# Patient Record
Sex: FEMALE | Race: WHITE | HISPANIC OR LATINO | Employment: FULL TIME | URBAN - METROPOLITAN AREA
[De-identification: names, ages, dates, MRNs, and addresses within clinical notes are randomized per-mention and may not be internally consistent; named-entity substitution may affect disease eponyms.]

---

## 2017-01-10 ENCOUNTER — ROUTINE PRENATAL (OUTPATIENT)
Dept: OBSTETRICS AND GYNECOLOGY | Facility: CLINIC | Age: 27
End: 2017-01-10
Payer: MEDICAID

## 2017-01-10 VITALS — DIASTOLIC BLOOD PRESSURE: 64 MMHG | SYSTOLIC BLOOD PRESSURE: 110 MMHG | WEIGHT: 155.19 LBS

## 2017-01-10 DIAGNOSIS — Z34.82 ENCOUNTER FOR SUPERVISION OF OTHER NORMAL PREGNANCY IN SECOND TRIMESTER: ICD-10-CM

## 2017-01-10 DIAGNOSIS — Z3A.13 13 WEEKS GESTATION OF PREGNANCY: Primary | ICD-10-CM

## 2017-01-10 PROCEDURE — 99213 OFFICE O/P EST LOW 20 MIN: CPT | Mod: S$PBB,TH,, | Performed by: OBSTETRICS & GYNECOLOGY

## 2017-01-10 PROCEDURE — 99212 OFFICE O/P EST SF 10 MIN: CPT | Mod: PBBFAC,PO | Performed by: OBSTETRICS & GYNECOLOGY

## 2017-01-10 PROCEDURE — 99999 PR PBB SHADOW E&M-EST. PATIENT-LVL II: CPT | Mod: PBBFAC,,, | Performed by: OBSTETRICS & GYNECOLOGY

## 2017-01-10 NOTE — PROGRESS NOTES
No complaints today   No nausea or vomiting   Denies vaginal bleeding or cramping   FHT 144x'  Verified with Doppler.  Prenatal labs reviewed  Quad screen ordered     General Pregnancy  recommendations given  PNV + H2O intake    FU in 4 weeks  Anatomy US at 20 weeks     Randy Quiros M.D.   OB/GYN

## 2017-02-07 ENCOUNTER — ROUTINE PRENATAL (OUTPATIENT)
Dept: OBSTETRICS AND GYNECOLOGY | Facility: CLINIC | Age: 27
End: 2017-02-07
Payer: MEDICAID

## 2017-02-07 VITALS — WEIGHT: 155.63 LBS | SYSTOLIC BLOOD PRESSURE: 104 MMHG | DIASTOLIC BLOOD PRESSURE: 64 MMHG

## 2017-02-07 DIAGNOSIS — Z34.82 ENCOUNTER FOR SUPERVISION OF OTHER NORMAL PREGNANCY IN SECOND TRIMESTER: ICD-10-CM

## 2017-02-07 DIAGNOSIS — Z3A.17 17 WEEKS GESTATION OF PREGNANCY: Primary | ICD-10-CM

## 2017-02-07 PROCEDURE — 99212 OFFICE O/P EST SF 10 MIN: CPT | Mod: PBBFAC,PO | Performed by: OBSTETRICS & GYNECOLOGY

## 2017-02-07 PROCEDURE — 99999 PR PBB SHADOW E&M-EST. PATIENT-LVL II: CPT | Mod: PBBFAC,,, | Performed by: OBSTETRICS & GYNECOLOGY

## 2017-02-07 PROCEDURE — 99213 OFFICE O/P EST LOW 20 MIN: CPT | Mod: TH,S$PBB,, | Performed by: OBSTETRICS & GYNECOLOGY

## 2017-02-07 NOTE — PROGRESS NOTES
No complaints today   No nausea or vomiting   Denies vaginal bleeding or cramping   FHT 144x'  Verified with Doppler.  Prenatal labs reviewed  Quad screen declined      General Pregnancy  recommendations given  PNV + H2O intake      Anatomy US at 20 weeks   FU in 4 weeks    Randy Quiros M.D.   OB/GYN

## 2017-03-01 ENCOUNTER — OFFICE VISIT (OUTPATIENT)
Dept: OBSTETRICS AND GYNECOLOGY | Facility: CLINIC | Age: 27
End: 2017-03-01
Payer: MEDICAID

## 2017-03-01 DIAGNOSIS — Z36.3 ANTENATAL SCREENING FOR MALFORMATION USING ULTRASONICS: Primary | ICD-10-CM

## 2017-03-01 DIAGNOSIS — Z3A.17 17 WEEKS GESTATION OF PREGNANCY: ICD-10-CM

## 2017-03-01 PROCEDURE — 76805 OB US >/= 14 WKS SNGL FETUS: CPT | Mod: PBBFAC,PO

## 2017-03-01 PROCEDURE — 76805 OB US >/= 14 WKS SNGL FETUS: CPT | Mod: 26,S$PBB,, | Performed by: OBSTETRICS & GYNECOLOGY

## 2017-03-07 ENCOUNTER — ROUTINE PRENATAL (OUTPATIENT)
Dept: OBSTETRICS AND GYNECOLOGY | Facility: CLINIC | Age: 27
End: 2017-03-07
Payer: MEDICAID

## 2017-03-07 VITALS — SYSTOLIC BLOOD PRESSURE: 100 MMHG | WEIGHT: 154.75 LBS | DIASTOLIC BLOOD PRESSURE: 66 MMHG

## 2017-03-07 DIAGNOSIS — O09.292 HX OF PREECLAMPSIA, PRIOR PREGNANCY, CURRENTLY PREGNANT, SECOND TRIMESTER: ICD-10-CM

## 2017-03-07 DIAGNOSIS — Z3A.21 21 WEEKS GESTATION OF PREGNANCY: Primary | ICD-10-CM

## 2017-03-07 PROCEDURE — 99999 PR PBB SHADOW E&M-EST. PATIENT-LVL II: CPT | Mod: PBBFAC,,, | Performed by: OBSTETRICS & GYNECOLOGY

## 2017-03-07 PROCEDURE — 99212 OFFICE O/P EST SF 10 MIN: CPT | Mod: PBBFAC,PO | Performed by: OBSTETRICS & GYNECOLOGY

## 2017-03-07 PROCEDURE — 99213 OFFICE O/P EST LOW 20 MIN: CPT | Mod: TH,S$PBB,, | Performed by: OBSTETRICS & GYNECOLOGY

## 2017-03-07 RX ORDER — LEVONORGESTREL 52 MG/1
INTRAUTERINE DEVICE INTRAUTERINE
Refills: 0 | COMMUNITY
Start: 2017-02-17

## 2017-03-07 NOTE — PROGRESS NOTES
No complaints today   No nausea or vomiting   Denies vaginal bleeding or cramping   FHT 136x'  Verified with Doppler.  Prenatal labs reviewed  Quad screen declined      General Pregnancy  recommendations given  PNV + H2O intake      Anatomy US at 20 weeks normal xy baby    FU in 4 weeks    Randy Quiros M.D.   OB/GYN

## 2017-04-04 ENCOUNTER — ROUTINE PRENATAL (OUTPATIENT)
Dept: OBSTETRICS AND GYNECOLOGY | Facility: CLINIC | Age: 27
End: 2017-04-04
Payer: MEDICAID

## 2017-04-04 ENCOUNTER — APPOINTMENT (OUTPATIENT)
Dept: LAB | Facility: HOSPITAL | Age: 27
End: 2017-04-04
Attending: OBSTETRICS & GYNECOLOGY
Payer: MEDICAID

## 2017-04-04 VITALS — WEIGHT: 157.88 LBS | DIASTOLIC BLOOD PRESSURE: 68 MMHG | SYSTOLIC BLOOD PRESSURE: 112 MMHG

## 2017-04-04 DIAGNOSIS — N90.89 VULVAR LESION: Primary | ICD-10-CM

## 2017-04-04 DIAGNOSIS — Z34.82 ENCOUNTER FOR SUPERVISION OF OTHER NORMAL PREGNANCY IN SECOND TRIMESTER: ICD-10-CM

## 2017-04-04 DIAGNOSIS — A60.00 GENITAL HERPES SIMPLEX, UNSPECIFIED SITE: ICD-10-CM

## 2017-04-04 PROCEDURE — 87529 HSV DNA AMP PROBE: CPT

## 2017-04-04 PROCEDURE — 99213 OFFICE O/P EST LOW 20 MIN: CPT | Mod: S$PBB,TH,, | Performed by: OBSTETRICS & GYNECOLOGY

## 2017-04-04 PROCEDURE — 99212 OFFICE O/P EST SF 10 MIN: CPT | Mod: PBBFAC,PO | Performed by: OBSTETRICS & GYNECOLOGY

## 2017-04-04 PROCEDURE — 99999 PR PBB SHADOW E&M-EST. PATIENT-LVL II: CPT | Mod: PBBFAC,,, | Performed by: OBSTETRICS & GYNECOLOGY

## 2017-04-04 RX ORDER — VALACYCLOVIR HYDROCHLORIDE 1 G/1
1000 TABLET, FILM COATED ORAL EVERY 12 HOURS
Qty: 20 TABLET | Refills: 0 | Status: SHIPPED | OUTPATIENT
Start: 2017-04-04 | End: 2017-04-14

## 2017-04-04 NOTE — PROGRESS NOTES
Refers one painful vulvar lesion   No nausea or vomiting   Denies vaginal bleeding or cramping   FHT 144x'  Verified with Doppler.  Prenatal labs reviewed  Quad screen declined      0.5 cm pustular lesion on left labia majora , compatible with genital herpes   Herpes culture is taken   Will start valtrex PO     General Pregnancy  recommendations given  PNV + H2O intake      Anatomy US at 20 weeks normal xy baby    FU in 4 weeks    Randy Quiros M.D.   OB/GYN

## 2017-04-10 LAB
HSV PCR SPECIMEN SOURCE: ABNORMAL
HSV1 PCR RESULT: NOT DETECTED
HSV2 PCR RESULT: DETECTED

## 2017-04-11 ENCOUNTER — TELEPHONE (OUTPATIENT)
Dept: OBSTETRICS AND GYNECOLOGY | Facility: CLINIC | Age: 27
End: 2017-04-11

## 2017-04-11 NOTE — TELEPHONE ENCOUNTER
Herpes DNA based probe is positive  Confirms genital herpes  .Please note that this is a sexually transmitted disease, patient  should abstain from sexual activity until sex partner is tested and treated ,and prevent re-infections using condoms.     Rx for valtrex sent to pharmacy after the visit     WIll need to start valtrex again at week 35 to prevent recurrence close to delivery       Randy Quiros M.D.   OB/GYN

## 2017-04-11 NOTE — TELEPHONE ENCOUNTER
Called and informed patient of results (Herpes DNA based probe is positive) patient agreed and verbalized understanding.

## 2017-04-18 ENCOUNTER — LAB VISIT (OUTPATIENT)
Dept: LAB | Facility: HOSPITAL | Age: 27
End: 2017-04-18
Attending: OBSTETRICS & GYNECOLOGY
Payer: MEDICAID

## 2017-04-18 DIAGNOSIS — N90.89 VULVAR LESION: ICD-10-CM

## 2017-04-18 LAB — GLUCOSE SERPL-MCNC: 108 MG/DL

## 2017-04-18 PROCEDURE — 82950 GLUCOSE TEST: CPT

## 2017-04-18 PROCEDURE — 36415 COLL VENOUS BLD VENIPUNCTURE: CPT

## 2017-04-20 ENCOUNTER — TELEPHONE (OUTPATIENT)
Dept: OBSTETRICS AND GYNECOLOGY | Facility: CLINIC | Age: 27
End: 2017-04-20

## 2017-04-21 NOTE — TELEPHONE ENCOUNTER
----- Message from Hannah Pablo sent at 4/20/2017  4:57 PM CDT -----  Contact: Self/[phone]  Patient is returning your call.Please advise

## 2017-05-02 ENCOUNTER — ROUTINE PRENATAL (OUTPATIENT)
Dept: OBSTETRICS AND GYNECOLOGY | Facility: CLINIC | Age: 27
End: 2017-05-02
Payer: MEDICAID

## 2017-05-02 VITALS — SYSTOLIC BLOOD PRESSURE: 100 MMHG | DIASTOLIC BLOOD PRESSURE: 68 MMHG | WEIGHT: 160.69 LBS

## 2017-05-02 DIAGNOSIS — Z34.83 ENCOUNTER FOR SUPERVISION OF OTHER NORMAL PREGNANCY IN THIRD TRIMESTER: ICD-10-CM

## 2017-05-02 DIAGNOSIS — Z3A.29 29 WEEKS GESTATION OF PREGNANCY: Primary | ICD-10-CM

## 2017-05-02 DIAGNOSIS — A60.00 GENITAL HERPES SIMPLEX, UNSPECIFIED SITE: ICD-10-CM

## 2017-05-02 PROCEDURE — 99213 OFFICE O/P EST LOW 20 MIN: CPT | Mod: S$PBB,TH,, | Performed by: OBSTETRICS & GYNECOLOGY

## 2017-05-02 PROCEDURE — 99212 OFFICE O/P EST SF 10 MIN: CPT | Mod: PBBFAC,PO | Performed by: OBSTETRICS & GYNECOLOGY

## 2017-05-02 PROCEDURE — 99999 PR PBB SHADOW E&M-EST. PATIENT-LVL II: CPT | Mod: PBBFAC,,, | Performed by: OBSTETRICS & GYNECOLOGY

## 2017-05-02 RX ORDER — METRONIDAZOLE 500 MG/1
500 TABLET ORAL EVERY 12 HOURS
Qty: 14 TABLET | Refills: 0 | Status: SHIPPED | OUTPATIENT
Start: 2017-05-02 | End: 2017-05-09

## 2017-05-11 ENCOUNTER — HOSPITAL ENCOUNTER (OUTPATIENT)
Facility: HOSPITAL | Age: 27
Discharge: HOME | End: 2017-05-12
Attending: OBSTETRICS & GYNECOLOGY | Admitting: OBSTETRICS & GYNECOLOGY
Payer: MEDICAID

## 2017-05-11 DIAGNOSIS — Z3A.30 30 WEEKS GESTATION OF PREGNANCY: Primary | ICD-10-CM

## 2017-05-11 DIAGNOSIS — M54.9 BACK PAIN: ICD-10-CM

## 2017-05-11 DIAGNOSIS — N12 PYELONEPHRITIS: ICD-10-CM

## 2017-05-11 LAB
AMORPH CRY URNS QL MICRO: NORMAL
BACTERIA #/AREA URNS HPF: NORMAL /HPF
BASOPHILS # BLD AUTO: 0.01 K/UL
BASOPHILS NFR BLD: 0.1 %
BILIRUB UR QL STRIP: NEGATIVE
CLARITY UR: CLEAR
COLOR UR: YELLOW
DIFFERENTIAL METHOD: ABNORMAL
EOSINOPHIL # BLD AUTO: 0.1 K/UL
EOSINOPHIL NFR BLD: 0.6 %
ERYTHROCYTE [DISTWIDTH] IN BLOOD BY AUTOMATED COUNT: 13.7 %
GLUCOSE UR QL STRIP: NEGATIVE
HCT VFR BLD AUTO: 34 %
HGB BLD-MCNC: 11.1 G/DL
HGB UR QL STRIP: NEGATIVE
KETONES UR QL STRIP: NEGATIVE
LEUKOCYTE ESTERASE UR QL STRIP: ABNORMAL
LYMPHOCYTES # BLD AUTO: 2.4 K/UL
LYMPHOCYTES NFR BLD: 17.3 %
MCH RBC QN AUTO: 28 PG
MCHC RBC AUTO-ENTMCNC: 32.6 %
MCV RBC AUTO: 86 FL
MICROSCOPIC COMMENT: NORMAL
MONOCYTES # BLD AUTO: 0.6 K/UL
MONOCYTES NFR BLD: 4.3 %
NEUTROPHILS # BLD AUTO: 10.7 K/UL
NEUTROPHILS NFR BLD: 76.8 %
NITRITE UR QL STRIP: NEGATIVE
PH UR STRIP: 8 [PH] (ref 5–8)
PLATELET # BLD AUTO: 263 K/UL
PMV BLD AUTO: 9.4 FL
PROT UR QL STRIP: NEGATIVE
RBC # BLD AUTO: 3.97 M/UL
RBC #/AREA URNS HPF: 2 /HPF (ref 0–4)
SP GR UR STRIP: 1.01 (ref 1–1.03)
SQUAMOUS #/AREA URNS HPF: NORMAL /HPF
URN SPEC COLLECT METH UR: ABNORMAL
UROBILINOGEN UR STRIP-ACNC: NEGATIVE EU/DL
WBC # BLD AUTO: 13.91 K/UL
WBC #/AREA URNS HPF: 3 /HPF (ref 0–5)

## 2017-05-11 PROCEDURE — 59025 FETAL NON-STRESS TEST: CPT

## 2017-05-11 PROCEDURE — 63600175 PHARM REV CODE 636 W HCPCS: Performed by: OBSTETRICS & GYNECOLOGY

## 2017-05-11 PROCEDURE — 81000 URINALYSIS NONAUTO W/SCOPE: CPT

## 2017-05-11 PROCEDURE — 96361 HYDRATE IV INFUSION ADD-ON: CPT | Mod: 59

## 2017-05-11 PROCEDURE — 96360 HYDRATION IV INFUSION INIT: CPT

## 2017-05-11 PROCEDURE — 25000003 PHARM REV CODE 250: Performed by: OBSTETRICS & GYNECOLOGY

## 2017-05-11 PROCEDURE — 87086 URINE CULTURE/COLONY COUNT: CPT

## 2017-05-11 PROCEDURE — 99211 OFF/OP EST MAY X REQ PHY/QHP: CPT

## 2017-05-11 PROCEDURE — 85025 COMPLETE CBC W/AUTO DIFF WBC: CPT

## 2017-05-11 PROCEDURE — 36415 COLL VENOUS BLD VENIPUNCTURE: CPT

## 2017-05-11 RX ORDER — OXYCODONE AND ACETAMINOPHEN 5; 325 MG/1; MG/1
1 TABLET ORAL EVERY 8 HOURS PRN
Status: DISCONTINUED | OUTPATIENT
Start: 2017-05-11 | End: 2017-05-12 | Stop reason: HOSPADM

## 2017-05-11 RX ORDER — ACETAMINOPHEN 500 MG
500 TABLET ORAL EVERY 6 HOURS PRN
Status: DISCONTINUED | OUTPATIENT
Start: 2017-05-11 | End: 2017-05-11

## 2017-05-11 RX ORDER — PRENATAL WITH FERROUS FUM AND FOLIC ACID 3080; 920; 120; 400; 22; 1.84; 3; 20; 10; 1; 12; 200; 27; 25; 2 [IU]/1; [IU]/1; MG/1; [IU]/1; MG/1; MG/1; MG/1; MG/1; MG/1; MG/1; UG/1; MG/1; MG/1; MG/1; MG/1
1 TABLET ORAL DAILY
Status: DISCONTINUED | OUTPATIENT
Start: 2017-05-12 | End: 2017-05-12 | Stop reason: HOSPADM

## 2017-05-11 RX ORDER — ONDANSETRON 8 MG/1
8 TABLET, ORALLY DISINTEGRATING ORAL EVERY 8 HOURS PRN
Status: DISCONTINUED | OUTPATIENT
Start: 2017-05-11 | End: 2017-05-12 | Stop reason: HOSPADM

## 2017-05-11 RX ADMIN — OXYCODONE HYDROCHLORIDE AND ACETAMINOPHEN 1 TABLET: 5; 325 TABLET ORAL at 01:05

## 2017-05-11 RX ADMIN — SODIUM CHLORIDE, SODIUM LACTATE, POTASSIUM CHLORIDE, AND CALCIUM CHLORIDE 1000 ML: .6; .31; .03; .02 INJECTION, SOLUTION INTRAVENOUS at 03:05

## 2017-05-11 RX ADMIN — CEFTRIAXONE 1 G: 1 INJECTION, SOLUTION INTRAVENOUS at 03:05

## 2017-05-11 RX ADMIN — OXYCODONE HYDROCHLORIDE AND ACETAMINOPHEN 1 TABLET: 5; 325 TABLET ORAL at 04:05

## 2017-05-11 RX ADMIN — ONDANSETRON 8 MG: 8 TABLET, ORALLY DISINTEGRATING ORAL at 01:05

## 2017-05-11 NOTE — H&P
OBSTERICS & GYNECOLOGY   ANTEPARTUM    Admit Date: 5/11/2017   LOS: 0 days     Reason for Admission:  Pyelonephritis  In pregnancy    SUBJECTIVE:     Jessi Samson is a 26 y.o. female at 30w6d who is here for 24 hours of   Severe back pain, radiated to the right,   Also suprapubic pain,  . No fever.     Patient reports None contractions, active fetal movement, Yes vaginal bleeding , No loss of fluid    Scheduled Meds:   cefTRIAXone (ROCEPHIN) IVPB  1 g Intravenous Q24H     Continuous Infusions:   lactated ringers 1,000 mL (05/11/17 1510)     PRN Meds:ondansetron, oxycodone-acetaminophen, promethazine (PHENERGAN) IVPB    Review of patient's allergies indicates:  No Known Allergies    OBJECTIVE:     Vital Signs (Most Recent)  Temp: 98.2 °F (36.8 °C) (05/11/17 1000)    Temperature Range Min/Max (Last 24H):  Temp:  [98.2 °F (36.8 °C)]     Vital Signs Range (Last 24H):  Temp:  [98.2 °F (36.8 °C)]     I & O (Last 24H):No intake or output data in the 24 hours ending 05/11/17 2226    Physical Exam:  General: well developed, well nourished, mild distress   Physical Exam     Constitutional: She is oriented to person, place, and time. She appears well-developed and well-nourished.  HENT:   Head: Normocephalic.   NECK: Neck symmetric without masses or thyromegaly.  Cardiovascular: Normal rate and regular rhythm.   Pulmonary/Chest: Effort normal and breath sounds normal. No respiratory distress. She has no wheezes.   Breasts: Symmetrical, no skin changes or visible lesions. No palpable masses, nipple discharge or adenopathy bilaterally.  Abdominal: Soft not distended. Bowel sounds are normal. She exhibits   no masses . No tenderness to palpation. Positive  CVA tenderness on the right   Genitourinary: Pelvic exam was performed with patient supine.   SSE cervix closed   Extremities normal no cyanosis ,edema.           FHT: 140 Cat 1 (reassuring)                 TOCO: Q 0 minutes     Laboratory:  CBC:   Recent Labs  Lab  17  1319   WBC 13.91*   RBC 3.97*   HGB 11.1*   HCT 34.0*      MCV 86   MCH 28.0   MCHC 32.6         ASSESSMENT/PLAN:     Active Hospital Problems    Diagnosis  POA    Back pain [M54.9]  Yes      Resolved Hospital Problems    Diagnosis Date Resolved POA   No resolved problems to display.       Assessment:   26 y.o.female  at 30w6d   Acute pyelonephritis     Plan:  Admit to hospital for IV hydration , pain control   WIll start Ceftriaxone 1 gr IV a day   NST BID  PNV  BCB in the am     Randy Quiros M.D.   OB/GYN    2017          Randy Quiros M.D.   OB/GYN    2017

## 2017-05-11 NOTE — IP AVS SNAPSHOT
[facility]  [address]  [place] [address]  Phone: [phone]           Instrucciones de Lee Ann de Pacientes  Nuestra meta es prepararlo para el éxito. Roshni paquete incluye información sobre rosales condición, medicamentos e instrucciones para cuidados en el hogar. Casa le ayudará a cuidarse y prevenir la necesidad de volver al hospital.     Por favor, hable con rosales enfermero o enfermera si tiene alguna pregunta..     Hay muchos detalles a recordar cuando se prepara para salir del hospital. Casa es lo que necesita hacer:    1. [place] rosales medicina. Si usted tiene marv receta, revise la lista de medicamentos en las siguientes páginas. Es posible que tenga nuevos medicamentos para recoger en la farmacia y otros que tendrá que dejar de jovita. Revise las instrucciones sobre cómo y cuándo jovita shelia medicamentos. Consulte con el médico o el enfermeras si no está seguro de qué hacer.    2. Ir a shelia citas de seguimiento. La información específica de seguimiento aparece en las siguientes páginas. Usted puede ser contactado por marv enfermera o proveedor clínico sobre las próximas citas. Estar seguro de que tiene todos los números de teléfono para comunicarse si es necesario. Por favor, póngase en contacto con la oficina de rosales profesional médico si no puede hacer marv juan.     3. Esté atento a señales de advertencia. El médico o la enfermera le dará señales de alarma detallados que debe observar y cuándo llamar para la ayuda. Estas instrucciones también pueden incluir información educativa acerca de rosales condición. Si usted experimenta cualquiera de las señales de advertencia para rosales rickie, llame a rosales médico.             [facility]    Si rosales médico no le ha indicado a lo contrário, por favor   contactar a [facility], nuestra línea de cuidado de enfermeras está disponible para asistirle 24/7.    [phone] (servicio [facility]Re[facility])    Enfermeras registradas de [facility] pueden ayudarle a reservar marv juan,  proveer educación para la rickie, asesoría clínica, y otros servicios de asesoramiento.                  ** Verificar la lista de medicamentos es correcta y está actualizada. Llevar esto con usted en reyna de emergencia. Si shelia medicamentos leung cambiado, por favor notifique a rosales proveedor de atención médica.             Lista de medicamentos      EMPIECE a jovita estos medicamentos        Additional Info                      oxycodone-acetaminophen 5-325 mg per tablet   También conocido louis:  ROXICET   Cantidad:  20 tablet   Recargas:  0   Dosis:  2 tablet    Última administración:  1 tablet on 5/12/2017 12:43 PM   Instrucciones:  Take 2 tablets by mouth every 8 (eight) hours as needed for Pain.     Begin Date    AM    Noon    PM    Bedtime         SIGA tomando estos medicamentos        Additional Info                      MIRENA 20 mcg/24 hr (5 years) IUD   Recargas:  0   Medicamento genérico:  levonorgestrel    Instrucciones:  TO BE INSERTED ONE TIME BY PRESCRIBER. ROUTE INTRAUTERINE.     Begin Date    AM    Noon    PM    Bedtime       prenatal #108-iron,carbonyl-FA 30-1 mg Tab   Recargas:  0    Instrucciones:  Take by mouth once daily.     Begin Date    AM    Noon    PM    Bedtime       valacyclovir 1000 MG tablet   También conocido louis:  VALTREX   Cantidad:  20 tablet   Recargas:  0   Dosis:  1000 mg    Instrucciones:  Take 1 tablet (1,000 mg total) by mouth every 12 (twelve) hours.     Begin Date    AM    Noon    PM    Bedtime            Dónde puede recoger shelia medicamentos      Estos medicamentos fueron enviados a [facility] [address]  [address], [place] [address]     Teléfono:  [phone]     oxycodone-acetaminophen 5-325 mg per tablet                  Por favor traiga a todos las citas de seguimiento:    1. Jessica copia de las instrucciones de darin.  2. Todos los medicamentos que está tomando paola momento, en shelia envases  originales.  3. Identificación y tarjeta de seguro.    Por favor llegue 15 minutos antes de la hora de la juan.    Por favor llame con 24 horas de antelación si tiene que cambiar desai juan y / o tiempo.        Brittnee Citas Programadas     May 23, 2017  9:00 AM CDT   Routine Prenatal Visit with MD [place] [facility] ([facility])    [address] [place] [address], [address]  5th Floor [facility]  [place] [address]   [phone]              Información de seguimiento     Seguimiento:       Cuándo:  5/19/2017        Instrucciones de darin     Órdenes Futuras    Activity as tolerated     Call MD for:  persistent nausea and vomiting or diarrhea     Call MD for:  redness, tenderness, or signs of infection (pain, swelling, redness, odor or green/yellow discharge around incision site)     Call MD for:  severe uncontrolled pain     Call MD for:  temperature >100.4     Diet general     Preguntas:    Total calories:      Fat restriction, if any:      Protein restriction, if any:      Na restriction, if any:      Fluid restriction:      Additional restrictions:          Instrucciones a cuate de darin       Discharge home in stable condition. Follow up with Dr. Quiros in office late next week. Patient may return to work no earlier than 5/17/2017.       Primary Diagnosis     Desai diagnosis primaria es:  30 Weeks Gestation Of Pregnancy      Información de Admisión     Fecha y hora Proveedor Departamento [facility]    5/11/2017  9:30 AM Randy Quiros MD [facility] [id number]      Los proveedores de cuidado     Personal Médico Rol [facility] Teléfono principal de la oficina    Randy Quiros MD Attending Provider Obstetrics and Gynecology [phone]      Brittnee signos vitales clovis     PS Pulso Temperatura Resp Peso Ultima menstruación    123/73 100 98.5 °F (36.9 °C) (Oral) 16 73 kg (160 lb 15 oz) 09/29/2016      Recent Lab Values        12/6/2016                          11:26 AM           A1C 5.1            Comment for A1C at 11:26 AM on 12/6/2016:  According to ADA guidelines, hemoglobin A1C <7.0% represents  optimal control in non-pregnant diabetic patients.  Different  metrics may apply to specific populations.   Standards of Medical Care in Diabetes - 2016.  For the purpose of screening for the presence of diabetes:  <5.7%     Consistent with the absence of diabetes  5.7-6.4%  Consistent with increasing risk for diabetes   (prediabetes)  >or=6.5%  Consistent with diabetes  Currently no consensus exists for use of hemoglobin A1C  for diagnosis of diabetes for children.        Pending Labs     Order Current Status    Urine culture In process      Alergias     A partir del:  5/12/2017        No Known Allergies      Directiva Anticipada     Jessica directiva anticipada es un documento que, en reyna de que ya no capaz de hacer decisiones por sí mismo, le dice a rosales equipo médico qué tipo de tratamiento quiere o no quiere recibir, o que le gustaría jovita esas decisiones para usted . Si actualmente no tiene jessica directiva anticipada, [facility] le anima a crear radha. Para más información llame al: [phone]), [phone] ([phone]), o entrando en [email or web address].        Language Assistance Services     ATTENTION: Language assistance services are available, free of charge. Please call [phone].      ATENCIÓN: Si habla español, tiene a rosales disposición servicios gratuitos de asistencia lingüística. Llame al [phone].     CHÚ Ý: N?u b?n nói Ti?ng Vi?t, có các d?ch v? h? tr? ngôn ng? mi?n phí dành cho b?n. G?i s? [phone].        Registrarse para [facility]     La activación de rosales cuenta [facility] es tan fácil louis 1-2-3!    1) Ir a [email or web address], seleccione Registrarse Ahora, meter el código de activación y rosales fecha de nacimiento, y seleccione Próximo.    [id number]  Expires: 6/26/2017  3:12 PM      2) Crear un nombre de usuario y contraseña para usar cuando se visita [facility] en  el futuro y selecciona marv pregunta de seguridad en reyna de que pierda rosales contraseña y seleccione Próximo.    3) Introduzca rosales dirección de correo electrónico y rodrigo mary en Registrarse!    Información Adicional  Si tiene alguna pregunta, por favor, e-mail [email or web address] o llame al [phone] para hablar con nuestro personal. Recuerde, Valariemargie no debe ser usada para necesidades urgentes. En reyna de emergencia médica, llame al 911.         [facility] cumple con las leyes federales aplicables de derechos civiles y no discrimina por motivos de alice, color, origen nacional, edad, discapacidad, o sexo.                        [facility]  [address]  [place] [address]  Phone: [phone]           Patient Discharge Instructions   Our goal is to set you up for success. This packet includes information on your condition, medications, and your home care.  It will help you care for yourself to prevent having to return to the hospital.     Please ask your nurse if you have any questions.      There are many details to remember when preparing to leave the hospital. Here is what you will need to do:    1. Take your medicine. If you are prescribed medications, review your Medication List on the following pages. You may have new medications to  at the pharmacy and others that you'll need to stop taking. Review the instructions for how and when to take your medications. Talk with your doctor or nurses if you are unsure of what to do.     2. Go to your follow-up appointments. Specific follow-up information is listed in the following pages. Your may be contacted by a nurse or clinical provider about future appointments. Be sure we have all of the phone numbers to reach you. Please contact your provider's office if you are unable to make an appointment.     3. Watch for warning signs. Your doctor or nurse will give you detailed warning signs to watch for and when to call for assistance.  These instructions may also include educational information about your condition. If you experience any of warning signs to your health, call your doctor.           [facility]  Unless otherwise directed by your provider, please   contact [facility], our nurse care line   that is available for 24/7 assistance.     [phone] (toll-free)     Registered nurses in the [facility]   provide: appointment scheduling, clinical advisement, health education, and other advisory services.              ** Verificar la lista de medicamentos es correcta y está actualizada. Llevar esto con usted en reyna de emergencia. Si shelia medicamentos leung cambiado, por favor notifique a rosales proveedor de atención médica.             Medication List      START taking these medications        Additional Info                      oxycodone-acetaminophen 5-325 mg per tablet   Commonly known as:  ROXICET   Quantity:  20 tablet   Refills:  0   Dose:  2 tablet    Last time this was given:  1 tablet on 5/12/2017 12:43 PM   Instructions:  Take 2 tablets by mouth every 8 (eight) hours as needed for Pain.     Begin Date    AM    Noon    PM    Bedtime         CONTINUE taking these medications        Additional Info                      MIRENA 20 mcg/24 hr (5 years) IUD   Refills:  0   Generic drug:  levonorgestrel    Instructions:  TO BE INSERTED ONE TIME BY PRESCRIBER. ROUTE INTRAUTERINE.     Begin Date    AM    Noon    PM    Bedtime       prenatal #108-iron,carbonyl-FA 30-1 mg Tab   Refills:  0    Instructions:  Take by mouth once daily.     Begin Date    AM    Noon    PM    Bedtime       valacyclovir 1000 MG tablet   Commonly known as:  VALTREX   Quantity:  20 tablet   Refills:  0   Dose:  1000 mg    Instructions:  Take 1 tablet (1,000 mg total) by mouth every 12 (twelve) hours.     Begin Date    AM    Noon    PM    Bedtime            Where to Get Your Medications      These medications were sent to [facility]  - [place] - [address]  [address], [place] [address]     Phone:  [phone]     oxycodone-acetaminophen 5-325 mg per tablet                  Por favor traiga a todos las citas de seguimiento:    1. Jessica copia de las instrucciones de darin.  2. Todos los medicamentos que está tomando paola momento, en shelia envases originales.  3. Identificación y tarjeta de seguro.    Por favor llegue 15 minutos antes de la hora de la juan.    Por favor llame con 24 horas de antelación si tiene que cambiar rosales juan y / o tiempo.        Your Scheduled Appointments     May 23, 2017  9:00 AM CDT   Routine Prenatal Visit with MD [place] [facility] ([facility])    [address], [address]  5th Floor [facility]  [place] [address] [phone]              Follow-up Information     Follow up In 1 week.        Discharge Instructions     Future Orders    Activity as tolerated     Call MD for:  persistent nausea and vomiting or diarrhea     Call MD for:  redness, tenderness, or signs of infection (pain, swelling, redness, odor or green/yellow discharge around incision site)     Call MD for:  severe uncontrolled pain     Call MD for:  temperature >100.4     Diet general     Questions:    Total calories:      Fat restriction, if any:      Protein restriction, if any:      Na restriction, if any:      Fluid restriction:      Additional restrictions:          Discharge Instructions       Discharge home in stable condition. Follow up with Dr. Quiros in office late next week. Patient may return to work no earlier than 5/17/2017.       Primary Diagnosis     Your primary diagnosis was:  30 Weeks Gestation Of Pregnancy      Admission Information     Date & Time Provider Department CSN    5/11/2017  9:30 AM Randy Quiros MD [facility] [id number]      Care Providers     Provider Role Specialty Primary office phone    Randy Quiros MD Attending Provider  Obstetrics and Gynecology [phone]      Your Vitals Were     BP Pulse Temp Resp Weight Last Period    123/73 100 98.5 °F (36.9 °C) (Oral) 16 73 kg (160 lb 15 oz) 09/29/2016      Recent Lab Values        12/6/2016                          11:26 AM           A1C 5.1           Comment for A1C at 11:26 AM on 12/6/2016:  According to ADA guidelines, hemoglobin A1C <7.0% represents  optimal control in non-pregnant diabetic patients.  Different  metrics may apply to specific populations.   Standards of Medical Care in Diabetes - 2016.  For the purpose of screening for the presence of diabetes:  <5.7%     Consistent with the absence of diabetes  5.7-6.4%  Consistent with increasing risk for diabetes   (prediabetes)  >or=6.5%  Consistent with diabetes  Currently no consensus exists for use of hemoglobin A1C  for diagnosis of diabetes for children.        Pending Labs     Order Current Status    Urine culture In process      Allergies as of 5/12/2017     No Known Allergies      Advance Directives     An advance directive is a document which, in the event you are no longer able to make decisions for yourself, tells your healthcare team what kind of treatment you do or do not want to receive, or who you would like to make those decisions for you.  If you do not currently have an advance directive, [facility] encourages you to create one.  For more information call:  [phone]), [phone] ([phone]),  or log on to [email or web address].        Language Assistance Services     ATTENTION: Language assistance services are available, free of charge. Please call [phone].      ATENCIÓN: Si habla español, tiene a rosales disposición servicios gratuitos de asistencia lingüística. Llame al [phone].     CHÚ Ý: N?u b?n nói Ti?ng Vi?t, có các d?ch v? h? tr? ngôn ng? mi?n phí dành cho b?n. G?i s? [phone].        [facility] Sign-Up     Activating your [facility] account is as easy as 1-2-3!     1)  Visit [email or web address], select Sign Up Now, enter this activation code and your date of birth, then select Next.  [email or web address]  Expires: 6/26/2017  3:12 PM      2) Create a username and password to use when you visit [facility] in the future and select a security question in case you lose your password and select Next.    3) Enter your e-mail address and click Sign Up!    Additional Information  If you have questions, please e-mail [email or web address] or call [phone] to talk to our [facility] staff. Remember, [facility] is NOT to be used for urgent needs. For medical emergencies, dial 911.          [facility] complies with applicable Federal civil rights laws and does not discriminate on the basis of race, color, national origin, age, disability, or sex.

## 2017-05-11 NOTE — PLAN OF CARE
Dr. Quiros in room to see patient. Plan of care discussed, patient able to recall content easily  14:20 Patient transferred to room 356 per Dr. Quiros.

## 2017-05-12 VITALS
TEMPERATURE: 99 F | RESPIRATION RATE: 16 BRPM | WEIGHT: 160.94 LBS | HEART RATE: 100 BPM | SYSTOLIC BLOOD PRESSURE: 123 MMHG | DIASTOLIC BLOOD PRESSURE: 73 MMHG

## 2017-05-12 PROBLEM — Z3A.30 30 WEEKS GESTATION OF PREGNANCY: Status: ACTIVE | Noted: 2017-05-12

## 2017-05-12 LAB
BASOPHILS # BLD AUTO: 0.02 K/UL
BASOPHILS NFR BLD: 0.2 %
DIFFERENTIAL METHOD: ABNORMAL
EOSINOPHIL # BLD AUTO: 0.1 K/UL
EOSINOPHIL NFR BLD: 0.7 %
ERYTHROCYTE [DISTWIDTH] IN BLOOD BY AUTOMATED COUNT: 13.9 %
HCT VFR BLD AUTO: 30 %
HGB BLD-MCNC: 9.8 G/DL
LYMPHOCYTES # BLD AUTO: 2.8 K/UL
LYMPHOCYTES NFR BLD: 21.8 %
MCH RBC QN AUTO: 28.1 PG
MCHC RBC AUTO-ENTMCNC: 32.7 %
MCV RBC AUTO: 86 FL
MONOCYTES # BLD AUTO: 1 K/UL
MONOCYTES NFR BLD: 7.6 %
NEUTROPHILS # BLD AUTO: 8.8 K/UL
NEUTROPHILS NFR BLD: 68 %
PLATELET # BLD AUTO: 253 K/UL
PMV BLD AUTO: 9.4 FL
RBC # BLD AUTO: 3.49 M/UL
WBC # BLD AUTO: 12.89 K/UL

## 2017-05-12 PROCEDURE — 85025 COMPLETE CBC W/AUTO DIFF WBC: CPT

## 2017-05-12 PROCEDURE — 25000003 PHARM REV CODE 250: Performed by: OBSTETRICS & GYNECOLOGY

## 2017-05-12 PROCEDURE — 59025 FETAL NON-STRESS TEST: CPT

## 2017-05-12 PROCEDURE — 36415 COLL VENOUS BLD VENIPUNCTURE: CPT

## 2017-05-12 PROCEDURE — 63600175 PHARM REV CODE 636 W HCPCS: Performed by: OBSTETRICS & GYNECOLOGY

## 2017-05-12 PROCEDURE — 96360 HYDRATION IV INFUSION INIT: CPT

## 2017-05-12 PROCEDURE — 96365 THER/PROPH/DIAG IV INF INIT: CPT

## 2017-05-12 RX ORDER — OXYCODONE AND ACETAMINOPHEN 5; 325 MG/1; MG/1
2 TABLET ORAL EVERY 8 HOURS PRN
Qty: 20 TABLET | Refills: 0 | Status: ON HOLD | OUTPATIENT
Start: 2017-05-12 | End: 2017-07-12 | Stop reason: HOSPADM

## 2017-05-12 RX ADMIN — OXYCODONE HYDROCHLORIDE AND ACETAMINOPHEN 1 TABLET: 5; 325 TABLET ORAL at 12:05

## 2017-05-12 RX ADMIN — CEFTRIAXONE 1 G: 1 INJECTION, SOLUTION INTRAVENOUS at 03:05

## 2017-05-12 NOTE — DISCHARGE INSTRUCTIONS
Discharge home in stable condition. Follow up with Dr. Quiros in office late next week. Patient may return to work no earlier than 5/17/2017.

## 2017-05-12 NOTE — PROGRESS NOTES
OBSTERICS & GYNECOLOGY   ANTEPARTUM    Admit Date: 5/11/2017   LOS: 0 days     Reason for Admission:  30 weeks gestation of pregnancy    SUBJECTIVE:     Jessi Samson is a 26 y.o. female at 31w2d who is here in observation for Flank pain presumably pyelonephritis   Patient has been asymptomatic , no fever, tolerating PO. Pain has improved .   No Elevated White count and urine has been clear .  Occasionally feel the back pain, but much improved since admission    Patient reports None contractions, active fetal movement, No vaginal bleeding , No loss of fluid    Scheduled Meds:  Continuous Infusions:  PRN Meds:    Review of patient's allergies indicates:  No Known Allergies    OBJECTIVE:     Vital Signs (Most Recent)  Temp: 98.5 °F (36.9 °C) (05/12/17 1508)  Pulse: 100 (05/12/17 1241)  Resp: 16 (05/12/17 0402)  BP: 123/73 (05/12/17 1241)    Temperature Range Min/Max (Last 24H):       Vital Signs Range (Last 24H):       I & O (Last 24H):No intake or output data in the 24 hours ending 05/14/17 1347    Physical Exam:  Physical Exam     Constitutional: She is oriented to person, place, and time. She appears well-developed and well-nourished. No distress.   HENT:   Head: Normocephalic.   NECK: Neck symmetric without masses or thyromegaly.  Cardiovascular: Normal rate and regular rhythm.   Pulmonary/Chest: Effort normal and breath sounds normal. No respiratory distress. She has no wheezes.   Breasts: Symmetrical, no skin changes or visible lesions. No palpable masses, nipple discharge or adenopathy bilaterally.  Abdominal: Gravid  Soft not distended. Bowel sounds are normal. She exhibits   no masses . No tenderness to palpation.  NO CVA tenderness   At this time   No pelvic toda  Extremities normal no cyanosis ,edema.   NST reactive this am                   Laboratory:  CBC:   Recent Labs  Lab 05/12/17  0519   WBC 12.89*   RBC 3.49*   HGB 9.8*   HCT 30.0*      MCV 86   MCH 28.1   MCHC 32.7          ASSESSMENT/PLAN:     Active Hospital Problems    Diagnosis  POA    *30 weeks gestation of pregnancy [Z3A.30]  Not Applicable    Back pain [M54.9]  Yes      Resolved Hospital Problems    Diagnosis Date Resolved POA   No resolved problems to display.       Assessment:   26 y.o.female  at 30weeks  GA   condition improving    Plan:  At think   I dont think it  is  Acute pyleonephritis  Might have only some hydronephrosis in pregnancy   Will stop Abx  Discharge home and follow up next week at the clinic                Randy Quiros M.D.   OB/GYN    2017

## 2017-05-12 NOTE — DISCHARGE SUMMARY
[facility]  Discharge Summary  Obstetrics - Antepartum      Admit Date: 5/11/2017    Discharge Date and Time:  05/12/2017 1:21 PM    Discharge Attending Physician: Randy Quiros MD     Discharge Provider: Randy Quiros    Reason for Admission: 1 IUP @ 30 weeks 2-Pyelonephritis     Procedures Performed: * No surgery found *    Hospital Course   Patient kept in observation overnight with initial Dx of acute pyelonephritis   Flank pain improved , no fever, no tachycardia  Blood work normal no elevated WBC   2 doses of IV Rocephin and pain management   NST reactive  PAtient is discharged with recommendations   Rx of Percocet PRN         Consults: none    Significant Diagnostic Studies: Labs:   CBC   Recent Labs  Lab 05/11/17  1319 05/12/17  0519   WBC 13.91* 12.89*   HGB 11.1* 9.8*   HCT 34.0* 30.0*    253       Final Diagnoses:   Principal Problem: 30 weeks gestation of pregnancy   Secondary Diagnoses:   Active Hospital Problems    Diagnosis  POA    *30 weeks gestation of pregnancy [Z3A.30]  Not Applicable    Back pain [M54.9]  Yes      Resolved Hospital Problems    Diagnosis Date Resolved POA   No resolved problems to display.       Discharged Condition: good    Disposition: Home or Self Care    Follow Up/Patient Instructions:     Medications:  Reconciled Home Medications:   Current Discharge Medication List      START taking these medications    Details   oxycodone-acetaminophen (ROXICET) 5-325 mg per tablet Take 2 tablets by mouth every 8 (eight) hours as needed for Pain.  Qty: 20 tablet, Refills: 0         CONTINUE these medications which have NOT CHANGED    Details   MIRENA 20 mcg/24 hr (5 years) IUD TO BE INSERTED ONE TIME BY PRESCRIBER. ROUTE INTRAUTERINE.  Refills: 0      prenatal #108-iron,carbonyl-FA 30-1 mg Tab Take by mouth once daily.      valacyclovir (VALTREX) 1000 MG tablet Take 1 tablet (1,000 mg total) by mouth every 12 (twelve) hours.  Qty: 20 tablet, Refills: 0              Discharge Procedure Orders  Diet general     Activity as tolerated     Call MD for:  temperature >100.4     Call MD for:  persistent nausea and vomiting or diarrhea     Call MD for:  severe uncontrolled pain     Call MD for:  redness, tenderness, or signs of infection (pain, swelling, redness, odor or green/yellow discharge around incision site)       Follow-up Information     Follow up In 1 week.          Randy Quiros M.D.   OB/GYN    5/12/2017

## 2017-05-12 NOTE — PLAN OF CARE
1900- pt lying in bed in the left side appears to be sleeping. Pt easily awakened. Pt denies and problems at this time. Initial assessment completed at this time. Explained POC for the shift. Pt stated she understands.  2031- NST started at this time.   2100- reactive and reassuring fetal strip noted. NST finished at this time.

## 2017-05-12 NOTE — PLAN OF CARE
0000  Assumed care of Pt.  Pt awakened for vital signs, afebrile.  Pt c/o right sided flank pain and requests pain medication.

## 2017-05-13 LAB
BACTERIA UR CULT: NORMAL
BACTERIA UR CULT: NORMAL

## 2017-05-23 ENCOUNTER — ROUTINE PRENATAL (OUTPATIENT)
Dept: OBSTETRICS AND GYNECOLOGY | Facility: CLINIC | Age: 27
End: 2017-05-23
Payer: MEDICAID

## 2017-05-23 VITALS
HEIGHT: 61 IN | BODY MASS INDEX: 30.88 KG/M2 | SYSTOLIC BLOOD PRESSURE: 107 MMHG | WEIGHT: 163.56 LBS | DIASTOLIC BLOOD PRESSURE: 70 MMHG

## 2017-05-23 DIAGNOSIS — Z3A.32 32 WEEKS GESTATION OF PREGNANCY: Primary | ICD-10-CM

## 2017-05-23 DIAGNOSIS — A60.00 GENITAL HERPES SIMPLEX, UNSPECIFIED SITE: ICD-10-CM

## 2017-05-23 DIAGNOSIS — Z34.83 ENCOUNTER FOR SUPERVISION OF OTHER NORMAL PREGNANCY IN THIRD TRIMESTER: ICD-10-CM

## 2017-05-23 PROCEDURE — 99999 PR PBB SHADOW E&M-EST. PATIENT-LVL III: CPT | Mod: PBBFAC,,, | Performed by: OBSTETRICS & GYNECOLOGY

## 2017-05-23 PROCEDURE — 87081 CULTURE SCREEN ONLY: CPT

## 2017-05-23 PROCEDURE — 99213 OFFICE O/P EST LOW 20 MIN: CPT | Mod: PBBFAC,PO | Performed by: OBSTETRICS & GYNECOLOGY

## 2017-05-23 PROCEDURE — 99213 OFFICE O/P EST LOW 20 MIN: CPT | Mod: TH,S$PBB,, | Performed by: OBSTETRICS & GYNECOLOGY

## 2017-05-23 RX ORDER — METRONIDAZOLE 500 MG/1
500 TABLET ORAL EVERY 12 HOURS
Qty: 14 TABLET | Refills: 0 | Status: SHIPPED | OUTPATIENT
Start: 2017-05-23 | End: 2017-05-30

## 2017-05-23 NOTE — PROGRESS NOTES
No nausea or vomiting   Denies vaginal bleeding or cramping   Prenatal labs reviewed  Quad screen declined      0.5 cm pustular lesion on left labia majora , compatible with genital herpes   Herpes culture is taken   Will start valtrex PO     General Pregnancy  recommendations given  PNV + H2O intake      Anatomy US at 20 weeks normal xy baby  Rx for flagyl for vaginal discharge     FU in3  weeks    Randy Quiros M.D.   OB/GYN

## 2017-05-27 LAB — BACTERIA SPEC AEROBE CULT: NORMAL

## 2017-05-29 ENCOUNTER — TELEPHONE (OUTPATIENT)
Dept: OBSTETRICS AND GYNECOLOGY | Facility: CLINIC | Age: 27
End: 2017-05-29

## 2017-06-14 ENCOUNTER — ROUTINE PRENATAL (OUTPATIENT)
Dept: OBSTETRICS AND GYNECOLOGY | Facility: CLINIC | Age: 27
End: 2017-06-14
Payer: MEDICAID

## 2017-06-14 VITALS
BODY MASS INDEX: 31.41 KG/M2 | SYSTOLIC BLOOD PRESSURE: 115 MMHG | WEIGHT: 166.25 LBS | DIASTOLIC BLOOD PRESSURE: 74 MMHG

## 2017-06-14 DIAGNOSIS — Z3A.35 35 WEEKS GESTATION OF PREGNANCY: Primary | ICD-10-CM

## 2017-06-14 DIAGNOSIS — A60.00 GENITAL HERPES SIMPLEX, UNSPECIFIED SITE: ICD-10-CM

## 2017-06-14 DIAGNOSIS — N90.89 VULVAR LESION: ICD-10-CM

## 2017-06-14 PROCEDURE — 99212 OFFICE O/P EST SF 10 MIN: CPT | Mod: PBBFAC,PO | Performed by: OBSTETRICS & GYNECOLOGY

## 2017-06-14 PROCEDURE — 99213 OFFICE O/P EST LOW 20 MIN: CPT | Mod: TH,S$PBB,, | Performed by: OBSTETRICS & GYNECOLOGY

## 2017-06-14 PROCEDURE — 99999 PR PBB SHADOW E&M-EST. PATIENT-LVL II: CPT | Mod: PBBFAC,,, | Performed by: OBSTETRICS & GYNECOLOGY

## 2017-06-16 ENCOUNTER — TELEPHONE (OUTPATIENT)
Dept: OBSTETRICS AND GYNECOLOGY | Facility: CLINIC | Age: 27
End: 2017-06-16

## 2017-06-16 NOTE — TELEPHONE ENCOUNTER
----- Message from Dimple Morgan sent at 6/15/2017  5:06 PM CDT -----  Contact: [phone]/pt's brother  Patient's brother requesting to speak with you regarding her medication. Please advise.

## 2017-06-17 RX ORDER — TERCONAZOLE 4 MG/G
1 CREAM VAGINAL NIGHTLY
Qty: 1 TUBE | Refills: 0 | Status: SHIPPED | OUTPATIENT
Start: 2017-06-17 | End: 2017-06-24

## 2017-06-17 RX ORDER — VALACYCLOVIR HYDROCHLORIDE 1 G/1
1000 TABLET, FILM COATED ORAL DAILY
Qty: 30 TABLET | Refills: 1 | Status: SHIPPED | OUTPATIENT
Start: 2017-06-17 | End: 2020-01-31

## 2017-06-17 NOTE — TELEPHONE ENCOUNTER
RX was sent already for  VAltrex prophylaxis   And Terazol    Randy Quiros M.D.   OB/GYN    6/17/2017

## 2017-06-18 ENCOUNTER — HOSPITAL ENCOUNTER (OUTPATIENT)
Facility: HOSPITAL | Age: 27
Discharge: HOME | End: 2017-06-18
Attending: OBSTETRICS & GYNECOLOGY | Admitting: OBSTETRICS & GYNECOLOGY
Payer: MEDICAID

## 2017-06-18 VITALS
HEIGHT: 60 IN | TEMPERATURE: 98 F | OXYGEN SATURATION: 99 % | DIASTOLIC BLOOD PRESSURE: 60 MMHG | BODY MASS INDEX: 32.79 KG/M2 | WEIGHT: 167 LBS | SYSTOLIC BLOOD PRESSURE: 116 MMHG | HEART RATE: 92 BPM

## 2017-06-18 DIAGNOSIS — R10.9 ABDOMINAL PAIN AFFECTING PREGNANCY: ICD-10-CM

## 2017-06-18 DIAGNOSIS — O26.899 ABDOMINAL PAIN AFFECTING PREGNANCY: ICD-10-CM

## 2017-06-18 PROCEDURE — 59025 FETAL NON-STRESS TEST: CPT

## 2017-06-18 PROCEDURE — 99211 OFF/OP EST MAY X REQ PHY/QHP: CPT | Mod: 25

## 2017-06-18 PROCEDURE — G0378 HOSPITAL OBSERVATION PER HR: HCPCS

## 2017-06-18 RX ORDER — ACETAMINOPHEN 500 MG
500 TABLET ORAL EVERY 6 HOURS PRN
Status: DISCONTINUED | OUTPATIENT
Start: 2017-06-18 | End: 2017-06-18 | Stop reason: HOSPADM

## 2017-06-18 RX ORDER — ONDANSETRON 8 MG/1
8 TABLET, ORALLY DISINTEGRATING ORAL EVERY 8 HOURS PRN
Status: DISCONTINUED | OUTPATIENT
Start: 2017-06-18 | End: 2017-06-18 | Stop reason: HOSPADM

## 2017-06-19 ENCOUNTER — TELEPHONE (OUTPATIENT)
Dept: OBSTETRICS AND GYNECOLOGY | Facility: CLINIC | Age: 27
End: 2017-06-19

## 2017-06-19 NOTE — PROGRESS NOTES
No nausea or vomiting   Denies vaginal bleeding or cramping   Prenatal labs reviewed  Quad screen declined      Vaginal lesion still visible.presumaby healing      General Pregnancy  recommendations given  PNV + H2O intake      Anatomy US at 20 weeks normal xy baby  Rx for flagyl for vaginal discharge     FU in 1 w     Randy Quiros M.D.   OB/GYN

## 2017-06-19 NOTE — DISCHARGE INSTRUCTIONS
1 Dolor de babar intenso que no se cassandra con marv dosis de Tylenol.    2 Vision borrosa o heri manchas danielle de shelia ojos.    3 Ninchazon repentino en la cori o scot.     4 Aumento de peso en solo unos pocos kemp.    5 Dolor de estomago o calambres.    6 Vomito que dura mas de 24 horas.    7 Fiebre mas de 100.4 grados.    8 Sangrado vaginal que es algo mas que manonar.    9 Secrecion vaginal excesiva e in usual.    10 Un flujo de liquido acvoso de la vagina.    11 Avsencia de movmiento del deanna significohiva comenzando en 24-36 semanas.    12 Menos de 37 semanas: mas de 4 contracciones en marv hora por 2 horas    13 Mas de 37 semanas: contracciones coda 5 minutos por 2 horas.    14 Hi 8-10 vasos.    Sequimiento con rosales medico cadacita.

## 2017-06-19 NOTE — PLAN OF CARE
1909 Pt arrived walking to triage 1 with c/o abdominal pain that started at 0700 now every 5-10 mins lasting about 30 seconds, denies vaginal bleeding or fluid discharge from vagina. Pt accompanied by s/o and mother. Pt gowned after voiding in BR and placed on EFM for assessment of fh and contraction pattern, VS taken  1920 Head to toe assessment done, pt c/o pain in right calf that started Wednesday, no swelling or redness noted at present time.  2012 Dr MAYE Hopkins notified re pts arrival, VS, contraction pattern, SVE results, and FH and notes from Dr Quiros re herpes lesion, and neg strep and pt continuing on valtrex- orders received.  2110 Discharge instructions given. Questions answered, pt voiced understanding.  2120 pt discharged walking accompanied by family

## 2017-06-22 ENCOUNTER — OFFICE VISIT (OUTPATIENT)
Dept: OBSTETRICS AND GYNECOLOGY | Facility: CLINIC | Age: 27
End: 2017-06-22
Payer: MEDICAID

## 2017-06-22 DIAGNOSIS — Z3A.35 35 WEEKS GESTATION OF PREGNANCY: ICD-10-CM

## 2017-06-22 DIAGNOSIS — O26.849 UTERINE SIZE DATE DISCREPANCY, ANTEPARTUM, UNSPECIFIED TRIMESTER: Primary | ICD-10-CM

## 2017-06-22 PROCEDURE — 76816 OB US FOLLOW-UP PER FETUS: CPT | Mod: 26,S$PBB,, | Performed by: OBSTETRICS & GYNECOLOGY

## 2017-06-22 PROCEDURE — 76816 OB US FOLLOW-UP PER FETUS: CPT | Mod: PBBFAC,PO

## 2017-06-28 ENCOUNTER — ROUTINE PRENATAL (OUTPATIENT)
Dept: OBSTETRICS AND GYNECOLOGY | Facility: CLINIC | Age: 27
End: 2017-06-28
Payer: MEDICAID

## 2017-06-28 VITALS
SYSTOLIC BLOOD PRESSURE: 112 MMHG | BODY MASS INDEX: 33.28 KG/M2 | DIASTOLIC BLOOD PRESSURE: 72 MMHG | WEIGHT: 170.44 LBS

## 2017-06-28 DIAGNOSIS — Z3A.37 37 WEEKS GESTATION OF PREGNANCY: Primary | ICD-10-CM

## 2017-06-28 DIAGNOSIS — Z3A.39 39 WEEKS GESTATION OF PREGNANCY: ICD-10-CM

## 2017-06-28 PROCEDURE — 99999 PR PBB SHADOW E&M-EST. PATIENT-LVL II: CPT | Mod: PBBFAC,,, | Performed by: OBSTETRICS & GYNECOLOGY

## 2017-06-28 PROCEDURE — 99213 OFFICE O/P EST LOW 20 MIN: CPT | Mod: S$PBB,TH,, | Performed by: OBSTETRICS & GYNECOLOGY

## 2017-06-28 PROCEDURE — 99212 OFFICE O/P EST SF 10 MIN: CPT | Mod: PBBFAC,PO | Performed by: OBSTETRICS & GYNECOLOGY

## 2017-06-28 RX ORDER — FERROUS SULFATE 325(65) MG
325 TABLET ORAL DAILY
Qty: 30 TABLET | Refills: 3 | Status: CANCELLED | OUTPATIENT
Start: 2017-06-28 | End: 2018-06-28

## 2017-06-28 RX ORDER — ONDANSETRON 4 MG/1
8 TABLET, ORALLY DISINTEGRATING ORAL EVERY 8 HOURS PRN
Status: CANCELLED | OUTPATIENT
Start: 2017-06-28

## 2017-06-28 RX ORDER — SODIUM CHLORIDE, SODIUM LACTATE, POTASSIUM CHLORIDE, CALCIUM CHLORIDE 600; 310; 30; 20 MG/100ML; MG/100ML; MG/100ML; MG/100ML
INJECTION, SOLUTION INTRAVENOUS CONTINUOUS
Status: CANCELLED | OUTPATIENT
Start: 2017-06-28

## 2017-06-28 RX ORDER — MISOPROSTOL 100 UG/1
600 TABLET ORAL
Status: CANCELLED | OUTPATIENT
Start: 2017-06-28

## 2017-06-28 NOTE — PROGRESS NOTES
No nausea or vomiting   Denies vaginal bleeding or cramping   Prenatal labs reviewed  Quad screen declined      General Pregnancy  recommendations given  PNV + H2O intake      Anatomy US at 20 weeks normal xy baby  Rx for flagyl for vaginal discharge     FU in 1 w   Labor induction for Sunday July 9     Randy Quiros M.D.   OB/GYN

## 2017-07-04 ENCOUNTER — HOSPITAL ENCOUNTER (OUTPATIENT)
Facility: HOSPITAL | Age: 27
Discharge: HOME | End: 2017-07-04
Attending: OBSTETRICS & GYNECOLOGY | Admitting: OBSTETRICS & GYNECOLOGY
Payer: MEDICAID

## 2017-07-04 VITALS
HEART RATE: 98 BPM | HEIGHT: 61 IN | RESPIRATION RATE: 18 BRPM | BODY MASS INDEX: 31.72 KG/M2 | SYSTOLIC BLOOD PRESSURE: 115 MMHG | TEMPERATURE: 98 F | WEIGHT: 168 LBS | DIASTOLIC BLOOD PRESSURE: 63 MMHG

## 2017-07-04 DIAGNOSIS — R10.9 ABDOMINAL PAIN: ICD-10-CM

## 2017-07-04 PROCEDURE — 59025 FETAL NON-STRESS TEST: CPT

## 2017-07-04 PROCEDURE — 99211 OFF/OP EST MAY X REQ PHY/QHP: CPT | Mod: 25

## 2017-07-04 NOTE — DISCHARGE INSTRUCTIONS
EL PARTO PREMATURO    Que esel parto prematuro?  El parto prematuro es el parto que comienza antes de completar las 37 semanas de embarazo.  Rosales deanna posria nacer demasiado temprano y sufrir graves problemas de rickie.    Tendra usted un parto prematuro?  Cualquier charles puede tener un partp prematuro, marlen algunas mujeres tienen mas probabilidades que otras.  Jessica charles es mas propensa a tener un parto prematuro si andrea:  · Esta embarazada con mellizos o mas bebes  · Tuvo un deanna prematuro en otro embarazo  · Tiene ciertos problemas del utero o paul del utero    Llame a rosales profesional de la rickie de inmediato si tiene alguna de estas senales de advertencia:  · Contracciones que hacen que rosales cara se endurezca mucho cada 10 minutos o con mas frecuencia  · Cambio en el color de rosales flujo vaginal, o sangrado de la vagina  · La sensacion de que rosales deanna esta empujando hacia abajo.  A esto se le llama presion pelvica  · Dolor debil en la parte baja de la espalda  · colicos louis si tuviera la saurabh o el periodo  · Dolor de cara con o sin diarrea      Busque Prontamente Atención Médica   si algo de lo siguiente ocurre:   Si tiene menos de 37 semanas y comienza a tener contracciones nuevamente.   Las contracciones son regulares, se tornan más prolongadas, más vesta y más seguidas (verdadero trabajo de parto).   Fiebre de 100.4°F (38°C) o más darin, o louis le haya indicado rosales proveedor de atención médica.   Le fluye agua o ashlee.   No está haynes si está teniendo un trabajo de parto falso o verdadero.    Leonard sobre las contracciones?  · Cuando los musculos del cuerpo se contraen, se tensan o endurecen al tacto.  Rosales utero es un musculo.  cuando se contrae, sentira que se tensa y endurece louis un puno.  No odas las contracciones son dolorosas.  pueden sentirse louis un colico o dolor en la parte baja de la espalsa.  cuando la contraccion pasa, el utero se ablanda nuevamente.  · Es normal que el utero se contraiga en  ocasiones christian el embarazo.  quizas le suceda cuando recien se acueste, despues de tener relaciones sexuales o despues de subir o bajar escaleras.  · No es normal tener contracciones regulares y frequenes antes de la fecha de parto.  Si  siente contracciones 10 minutos o con mas fracuencia christian marv hora (mas de annette contracciones en un hora), significa que el utero se esta contrayendo demasiado.  Llame al profesional de la rickie en milton reyna.        POR QUE CADA SEMANA ES IMPORTANTE    Cada vez mas partos se progaman un poco mas temprano sin justificacion medica.  Los expertos estan descubriendo que esto puede ocasionar problemas tanto para la mama louis para el deanna.  Si rosales embarazo es argenis, espere a que el parto comience por si solo.  Muchas cosas importantes le suceden a rosales deanna en las ulrimas semanas de embarazo.  Por ejemplo, los pulmones y el cerebro todavia se estan desarrollando.    Si rosales embarazo es argenis y esta planeando programar el nacimiento de rosales deanna, le conviene mantener el embrazo christian al menos 39 semanas.  Los bebes que nacen muy temprano pueden tener mas tarde en la inderjit que los bebes que nacen mas tarde.  Permanecer embarazada por 39 semanas, le gil al cuerpo del deanna todo el tiempo que necesita para crecer.    Estos son los motivos por los que rosales deanna necesita las 39 semanas:  · Los organos importantes louis el cerebro, los pulmones y el higado tienen la cantidad de tiempo que necesitan para desarrollarse.  · Existen menos probabilidades de que el deanna tenga problemas de la vista y audicion despues de nacer.  · El deanna tiene tiempo para aumentar de peso en el utero.  Los bebes que nacen con un peso argenis no tienen tantas dificultades para mantener la temperatura corpal louis los bebes que nacen muy pequenos.  · El deanna podra succionar, tragar y permanecer despierto lo suficiente para comer despues de nacer.  Los bebes que nacen temprano a veces no pueden hacer estas  cosas.                                                     RECUENTO DE PATADAS      Recuento de patadas   Es normal que le preocupe la rickie de rosales bebé. Para saber si el bebé está fabricio, usted puede anotar las veces que usted siente shelia pataditas en un registro de movimientos todos los días. Normalmente es posible sentir que el bebé se mueve a partir de la semana 20 del embarazo. No olvide llevar los registros de los movimientos del bebé a todas las citas que tenga con rosales proveedor de atención médica.     Cómo contar los movimientos   Escoja marv hora cuando el bebé esté activo, louis por ejemplo después de marv comida.   Siéntese cómodamente o acuéstese de lado.   La primera vez que el bebé se mueva, anote la hora.   Cuente cada movimiento hasta que el bebé se haya movido 10 veces. (Vander puede llevar entre 20 minutos y 2 horas.)   Si el bebé no se ha movido 4 veces en 1 hora, dése marv palmadita en el abdomen para despertarlo.   Anote la hora en que sienta el décimo movimiento del bebé.   Trate de hacer esto a la misma hora todos los días.    Cuándo debe llamar al proveedor de atención médica     Llame a rosales proveedor de atención médica en el acto en cualquiera de los siguientes casos:   Si el bebé se mueve menos de 10 veces en 4 horas mientras usted está llevando la cuenta de las pataditas.   Si el bebé se mueve con mucha menos frecuencia que en días anteriores.   Si usted no ha sentido movimientos del bebé en todo el día.      TIEMPO GUIDO DE PATADAS

## 2017-07-04 NOTE — PLAN OF CARE
0642 rec report from ANNALEE Walton, assumed care    0700 head to toe assessment completed, pt complains of mild back pain, pt denies vag bleeding and leaking fluid, explained plan of care, call light in reach, spouse at the bedside    0745 efm removed, discussed discharge with pt with lorrie burrows, pt left ambulating with spouse

## 2017-07-04 NOTE — PLAN OF CARE
0630- pt arrived to floor with complaints of pain since . Stated pain is in the back and goes to the front. SVE done no cervical change since last check. Pt is a 38.4 week  of Dr del valle.   0640- Dr del valle on unit he stated to get a 20 min strip on pt and if no contractions pt is ok to go home since no chance in cervix.  0642- report given to NANALEE Arvizu.

## 2017-07-05 ENCOUNTER — ROUTINE PRENATAL (OUTPATIENT)
Dept: OBSTETRICS AND GYNECOLOGY | Facility: CLINIC | Age: 27
End: 2017-07-05
Payer: MEDICAID

## 2017-07-05 VITALS
WEIGHT: 170.19 LBS | DIASTOLIC BLOOD PRESSURE: 70 MMHG | SYSTOLIC BLOOD PRESSURE: 100 MMHG | BODY MASS INDEX: 32.16 KG/M2

## 2017-07-05 DIAGNOSIS — Z34.83 ENCOUNTER FOR SUPERVISION OF OTHER NORMAL PREGNANCY IN THIRD TRIMESTER: ICD-10-CM

## 2017-07-05 DIAGNOSIS — N90.89 VULVAR LESION: ICD-10-CM

## 2017-07-05 DIAGNOSIS — A60.00 GENITAL HERPES SIMPLEX, UNSPECIFIED SITE: ICD-10-CM

## 2017-07-05 DIAGNOSIS — Z3A.38 38 WEEKS GESTATION OF PREGNANCY: Primary | ICD-10-CM

## 2017-07-05 PROCEDURE — 99213 OFFICE O/P EST LOW 20 MIN: CPT | Mod: S$PBB,TH,, | Performed by: OBSTETRICS & GYNECOLOGY

## 2017-07-05 PROCEDURE — 99999 PR PBB SHADOW E&M-EST. PATIENT-LVL II: CPT | Mod: PBBFAC,,, | Performed by: OBSTETRICS & GYNECOLOGY

## 2017-07-05 PROCEDURE — 99212 OFFICE O/P EST SF 10 MIN: CPT | Mod: PBBFAC,PO | Performed by: OBSTETRICS & GYNECOLOGY

## 2017-07-05 NOTE — PROGRESS NOTES
No nausea or vomiting   Denies vaginal bleeding or cramping   Prenatal labs reviewed  Quad screen declined      General Pregnancy  recommendations given  PNV + H2O intake      Anatomy US at 20 weeks normal xy baby  Rx for flagyl for vaginal discharge     Labor precautions   Labor induction for Sunday July 9     Randy Quiros M.D.   OB/GYN

## 2017-07-09 ENCOUNTER — HOSPITAL ENCOUNTER (INPATIENT)
Facility: HOSPITAL | Age: 27
LOS: 3 days | Discharge: HOME | End: 2017-07-12
Attending: OBSTETRICS & GYNECOLOGY | Admitting: OBSTETRICS & GYNECOLOGY
Payer: MEDICAID

## 2017-07-09 DIAGNOSIS — Z3A.39 39 WEEKS GESTATION OF PREGNANCY: ICD-10-CM

## 2017-07-09 DIAGNOSIS — Z3A.37 37 WEEKS GESTATION OF PREGNANCY: ICD-10-CM

## 2017-07-09 LAB
ABO + RH BLD: NORMAL
BASOPHILS # BLD AUTO: 0.01 K/UL
BASOPHILS NFR BLD: 0.1 %
BLD GP AB SCN CELLS X3 SERPL QL: NORMAL
DIFFERENTIAL METHOD: ABNORMAL
EOSINOPHIL # BLD AUTO: 0 K/UL
EOSINOPHIL NFR BLD: 0.3 %
ERYTHROCYTE [DISTWIDTH] IN BLOOD BY AUTOMATED COUNT: 14.4 %
HCT VFR BLD AUTO: 31.3 %
HGB BLD-MCNC: 10.1 G/DL
LYMPHOCYTES # BLD AUTO: 2.3 K/UL
LYMPHOCYTES NFR BLD: 18.4 %
MCH RBC QN AUTO: 26.9 PG
MCHC RBC AUTO-ENTMCNC: 32.3 %
MCV RBC AUTO: 84 FL
MONOCYTES # BLD AUTO: 1.1 K/UL
MONOCYTES NFR BLD: 8.5 %
NEUTROPHILS # BLD AUTO: 8.9 K/UL
NEUTROPHILS NFR BLD: 71.3 %
PLATELET # BLD AUTO: 277 K/UL
PMV BLD AUTO: 9.4 FL
RBC # BLD AUTO: 3.75 M/UL
WBC # BLD AUTO: 12.41 K/UL

## 2017-07-09 PROCEDURE — 85025 COMPLETE CBC W/AUTO DIFF WBC: CPT

## 2017-07-09 PROCEDURE — 86900 BLOOD TYPING SEROLOGIC ABO: CPT

## 2017-07-09 PROCEDURE — 36415 COLL VENOUS BLD VENIPUNCTURE: CPT

## 2017-07-09 PROCEDURE — 25000003 PHARM REV CODE 250: Performed by: OBSTETRICS & GYNECOLOGY

## 2017-07-09 PROCEDURE — 72100002 HC LABOR CARE, 1ST 8 HOURS

## 2017-07-09 PROCEDURE — 86920 COMPATIBILITY TEST SPIN: CPT

## 2017-07-09 PROCEDURE — 11000001 HC ACUTE MED/SURG PRIVATE ROOM

## 2017-07-09 PROCEDURE — 86901 BLOOD TYPING SEROLOGIC RH(D): CPT

## 2017-07-09 RX ORDER — MISOPROSTOL 200 UG/1
600 TABLET ORAL
Status: DISCONTINUED | OUTPATIENT
Start: 2017-07-09 | End: 2017-07-10

## 2017-07-09 RX ORDER — SODIUM CHLORIDE, SODIUM LACTATE, POTASSIUM CHLORIDE, CALCIUM CHLORIDE 600; 310; 30; 20 MG/100ML; MG/100ML; MG/100ML; MG/100ML
INJECTION, SOLUTION INTRAVENOUS CONTINUOUS
Status: DISCONTINUED | OUTPATIENT
Start: 2017-07-09 | End: 2017-07-10

## 2017-07-09 RX ORDER — OXYTOCIN/RINGER'S LACTATE 20/1000 ML
2 PLASTIC BAG, INJECTION (ML) INTRAVENOUS CONTINUOUS
Status: DISCONTINUED | OUTPATIENT
Start: 2017-07-10 | End: 2017-07-12 | Stop reason: HOSPADM

## 2017-07-09 RX ORDER — SODIUM CHLORIDE, SODIUM LACTATE, POTASSIUM CHLORIDE, CALCIUM CHLORIDE 600; 310; 30; 20 MG/100ML; MG/100ML; MG/100ML; MG/100ML
INJECTION, SOLUTION INTRAVENOUS CONTINUOUS
Status: DISCONTINUED | OUTPATIENT
Start: 2017-07-10 | End: 2017-07-10

## 2017-07-09 RX ORDER — ONDANSETRON 8 MG/1
8 TABLET, ORALLY DISINTEGRATING ORAL EVERY 8 HOURS PRN
Status: DISCONTINUED | OUTPATIENT
Start: 2017-07-09 | End: 2017-07-12 | Stop reason: HOSPADM

## 2017-07-09 RX ADMIN — SODIUM CHLORIDE, SODIUM LACTATE, POTASSIUM CHLORIDE, AND CALCIUM CHLORIDE: .6; .31; .03; .02 INJECTION, SOLUTION INTRAVENOUS at 09:07

## 2017-07-10 ENCOUNTER — ANESTHESIA EVENT (OUTPATIENT)
Dept: OBSTETRICS AND GYNECOLOGY | Facility: HOSPITAL | Age: 27
End: 2017-07-10
Payer: MEDICAID

## 2017-07-10 ENCOUNTER — ANESTHESIA (OUTPATIENT)
Dept: OBSTETRICS AND GYNECOLOGY | Facility: HOSPITAL | Age: 27
End: 2017-07-10
Payer: MEDICAID

## 2017-07-10 PROCEDURE — 27800516 HC TRAY, EPIDURAL COMBO: Performed by: STUDENT IN AN ORGANIZED HEALTH CARE EDUCATION/TRAINING PROGRAM

## 2017-07-10 PROCEDURE — 11000001 HC ACUTE MED/SURG PRIVATE ROOM

## 2017-07-10 PROCEDURE — 25000003 PHARM REV CODE 250: Performed by: STUDENT IN AN ORGANIZED HEALTH CARE EDUCATION/TRAINING PROGRAM

## 2017-07-10 PROCEDURE — 59409 OBSTETRICAL CARE: CPT | Mod: GB,,, | Performed by: OBSTETRICS & GYNECOLOGY

## 2017-07-10 PROCEDURE — 25000003 PHARM REV CODE 250: Performed by: OBSTETRICS & GYNECOLOGY

## 2017-07-10 PROCEDURE — 51702 INSERT TEMP BLADDER CATH: CPT

## 2017-07-10 PROCEDURE — 10907ZC DRAINAGE OF AMNIOTIC FLUID, THERAPEUTIC FROM PRODUCTS OF CONCEPTION, VIA NATURAL OR ARTIFICIAL OPENING: ICD-10-PCS | Performed by: OBSTETRICS & GYNECOLOGY

## 2017-07-10 PROCEDURE — 27200710 HC EPIDURAL INFUSION PUMP SET: Performed by: STUDENT IN AN ORGANIZED HEALTH CARE EDUCATION/TRAINING PROGRAM

## 2017-07-10 PROCEDURE — 63600175 PHARM REV CODE 636 W HCPCS

## 2017-07-10 PROCEDURE — 63600175 PHARM REV CODE 636 W HCPCS: Performed by: OBSTETRICS & GYNECOLOGY

## 2017-07-10 PROCEDURE — 72100003 HC LABOR CARE, EA. ADDL. 8 HRS

## 2017-07-10 PROCEDURE — 36430 TRANSFUSION BLD/BLD COMPNT: CPT

## 2017-07-10 PROCEDURE — 0KQM0ZZ REPAIR PERINEUM MUSCLE, OPEN APPROACH: ICD-10-PCS | Performed by: OBSTETRICS & GYNECOLOGY

## 2017-07-10 PROCEDURE — P9021 RED BLOOD CELLS UNIT: HCPCS

## 2017-07-10 PROCEDURE — 62326 NJX INTERLAMINAR LMBR/SAC: CPT | Performed by: STUDENT IN AN ORGANIZED HEALTH CARE EDUCATION/TRAINING PROGRAM

## 2017-07-10 PROCEDURE — 63600175 PHARM REV CODE 636 W HCPCS: Performed by: STUDENT IN AN ORGANIZED HEALTH CARE EDUCATION/TRAINING PROGRAM

## 2017-07-10 RX ORDER — METOCLOPRAMIDE HYDROCHLORIDE 5 MG/ML
10 INJECTION INTRAMUSCULAR; INTRAVENOUS ONCE
Status: DISCONTINUED | OUTPATIENT
Start: 2017-07-10 | End: 2017-07-10

## 2017-07-10 RX ORDER — MISOPROSTOL 200 UG/1
200 TABLET ORAL EVERY 6 HOURS
Status: COMPLETED | OUTPATIENT
Start: 2017-07-10 | End: 2017-07-11

## 2017-07-10 RX ORDER — METHYLERGONOVINE MALEATE 0.2 MG/ML
INJECTION INTRAVENOUS
Status: COMPLETED
Start: 2017-07-10 | End: 2017-07-10

## 2017-07-10 RX ORDER — FAMOTIDINE 10 MG/ML
20 INJECTION INTRAVENOUS ONCE
Status: DISCONTINUED | OUTPATIENT
Start: 2017-07-10 | End: 2017-07-10

## 2017-07-10 RX ORDER — OXYCODONE AND ACETAMINOPHEN 10; 325 MG/1; MG/1
1 TABLET ORAL EVERY 4 HOURS PRN
Status: DISCONTINUED | OUTPATIENT
Start: 2017-07-10 | End: 2017-07-12 | Stop reason: HOSPADM

## 2017-07-10 RX ORDER — BUTORPHANOL TARTRATE 1 MG/ML
1 INJECTION INTRAMUSCULAR; INTRAVENOUS ONCE
Status: COMPLETED | OUTPATIENT
Start: 2017-07-10 | End: 2017-07-10

## 2017-07-10 RX ORDER — MISOPROSTOL 200 UG/1
800 TABLET ORAL ONCE
Status: COMPLETED | OUTPATIENT
Start: 2017-07-10 | End: 2017-07-10

## 2017-07-10 RX ORDER — HYDROCODONE BITARTRATE AND ACETAMINOPHEN 500; 5 MG/1; MG/1
TABLET ORAL
Status: DISCONTINUED | OUTPATIENT
Start: 2017-07-10 | End: 2017-07-12 | Stop reason: HOSPADM

## 2017-07-10 RX ORDER — SODIUM CHLORIDE 9 MG/ML
INJECTION, SOLUTION INTRAVENOUS CONTINUOUS
Status: DISCONTINUED | OUTPATIENT
Start: 2017-07-10 | End: 2017-07-12 | Stop reason: HOSPADM

## 2017-07-10 RX ORDER — ROPIVACAINE HYDROCHLORIDE 2 MG/ML
INJECTION, SOLUTION EPIDURAL; INFILTRATION; PERINEURAL
Status: DISCONTINUED | OUTPATIENT
Start: 2017-07-10 | End: 2017-07-10

## 2017-07-10 RX ORDER — NAPROXEN 500 MG/1
500 TABLET ORAL EVERY 8 HOURS PRN
Status: DISCONTINUED | OUTPATIENT
Start: 2017-07-10 | End: 2017-07-12 | Stop reason: HOSPADM

## 2017-07-10 RX ORDER — METHYLERGONOVINE MALEATE 0.2 MG/ML
200 INJECTION INTRAVENOUS
Status: COMPLETED | OUTPATIENT
Start: 2017-07-10 | End: 2017-07-10

## 2017-07-10 RX ORDER — OXYCODONE AND ACETAMINOPHEN 5; 325 MG/1; MG/1
1 TABLET ORAL EVERY 4 HOURS PRN
Status: DISCONTINUED | OUTPATIENT
Start: 2017-07-10 | End: 2017-07-12 | Stop reason: HOSPADM

## 2017-07-10 RX ORDER — FENTANYL CITRATE 50 UG/ML
INJECTION, SOLUTION INTRAMUSCULAR; INTRAVENOUS
Status: DISCONTINUED | OUTPATIENT
Start: 2017-07-10 | End: 2017-07-10

## 2017-07-10 RX ORDER — OXYTOCIN/RINGER'S LACTATE 20/1000 ML
41.65 PLASTIC BAG, INJECTION (ML) INTRAVENOUS CONTINUOUS
Status: ACTIVE | OUTPATIENT
Start: 2017-07-10 | End: 2017-07-11

## 2017-07-10 RX ORDER — SODIUM CITRATE AND CITRIC ACID MONOHYDRATE 334; 500 MG/5ML; MG/5ML
30 SOLUTION ORAL ONCE
Status: DISCONTINUED | OUTPATIENT
Start: 2017-07-10 | End: 2017-07-10

## 2017-07-10 RX ADMIN — METHYLERGONOVINE MALEATE 200 MCG: 0.2 INJECTION, SOLUTION INTRAMUSCULAR; INTRAVENOUS at 08:07

## 2017-07-10 RX ADMIN — MISOPROSTOL 800 MCG: 200 TABLET ORAL at 08:07

## 2017-07-10 RX ADMIN — Medication 41.65 MILLI-UNITS/MIN: at 08:07

## 2017-07-10 RX ADMIN — Medication 10 ML/HR: at 09:07

## 2017-07-10 RX ADMIN — METHYLERGONOVINE MALEATE 200 MCG: 0.2 INJECTION INTRAVENOUS at 08:07

## 2017-07-10 RX ADMIN — Medication 30 MILLI-UNITS/MIN: at 04:07

## 2017-07-10 RX ADMIN — OXYCODONE HYDROCHLORIDE AND ACETAMINOPHEN 1 TABLET: 10; 325 TABLET ORAL at 10:07

## 2017-07-10 RX ADMIN — FENTANYL CITRATE 100 MCG: 50 INJECTION, SOLUTION INTRAMUSCULAR; INTRAVENOUS at 06:07

## 2017-07-10 RX ADMIN — BUTORPHANOL TARTRATE 1 MG: 1 INJECTION, SOLUTION INTRAMUSCULAR; INTRAVENOUS at 06:07

## 2017-07-10 RX ADMIN — Medication 2 MILLI-UNITS/MIN: at 02:07

## 2017-07-10 RX ADMIN — ONDANSETRON 8 MG: 8 TABLET, ORALLY DISINTEGRATING ORAL at 08:07

## 2017-07-10 RX ADMIN — ROPIVACAINE HYDROCHLORIDE 10 ML: 2 INJECTION, SOLUTION EPIDURAL; INFILTRATION at 06:07

## 2017-07-10 RX ADMIN — MISOPROSTOL 200 MCG: 200 TABLET ORAL at 08:07

## 2017-07-10 RX ADMIN — ONDANSETRON 8 MG: 8 TABLET, ORALLY DISINTEGRATING ORAL at 11:07

## 2017-07-10 NOTE — NURSING
184 - Report received of  at 39 weeks 3 days admitted for IOL from Alea Koroma RN. Care assumed. Full assessment done, history and medications reviewed with pt. Pt and family updated on plan of care with questions answered. Bed in low locked position, call bell in reach.     - Dr. Quiros at bedside for SVE, pt complete/0, RN to push with pt.     - Dr. Quiros at bedside, room set for delivery.     - Delivery of viable male infant, APGARS 9/9. See delivery record for interventions.     - Recovery started. Tranfuse 2 units PRBC per MD order. Orders placed, awaiting blood availability.     - Pt transferred to room 303 in wheelchair with infant in crib at side. Blood and pitocin continue to infuse. Report given to ANNALEE Soni.

## 2017-07-10 NOTE — PLAN OF CARE
2013-pt arrived to unit for scheduled IOL for Dr Quiros. Pt escorted to room. Pt gowned and assisted into bed. Initial assessment completed at this time. Explained POC of care for the night. Will be calling Dr Quiros in a little while for induction orders.    2145- spoke to dr quiros. Orders for pitocin to be given at 3am.

## 2017-07-10 NOTE — ANESTHESIA PREPROCEDURE EVALUATION
07/10/2017  Jessi Samson is a 27 y.o., female. Full term pregnancy in labor, requesting epidural.    Anesthesia Evaluation    I have reviewed the Patient Summary Reports.    I have reviewed the Nursing Notes.   I have reviewed the Medications.     Review of Systems  Anesthesia Hx:  Denies Hx of Anesthetic complications  Neg history of prior surgery. Denies Family Hx of Anesthesia complications.    Social:  Non-Smoker, No Alcohol Use    EENT/Dental:   Upper and lower dental braces.   Cardiovascular:  Cardiovascular Normal Exercise tolerance: good     Pulmonary:  Pulmonary Normal    Renal/:  Renal/ Normal     Hepatic/GI:   Denies GERD.    OB/GYN/PEDS:  Full term pregnancy   Neurological:  Neurology Normal    Endocrine:  Endocrine Normal      No past medical history on file.  No past surgical history on file.  Review of patient's allergies indicates:  No Known Allergies      Physical Exam  General:  Well nourished    Airway/Jaw/Neck:  Airway Findings: Mouth Opening: Normal Mallampati: II     Eyes/Ears/Nose:  EYES/EARS/NOSE FINDINGS: Normal   Dental:  Dental Findings: upper braces, lower braces        Mental Status:  Mental Status Findings:  Cooperative, Alert and Oriented       Lab Results   Component Value Date    WBC 12.41 07/09/2017    HGB 10.1 (L) 07/09/2017    HCT 31.3 (L) 07/09/2017     07/09/2017     12/06/2016    K 3.7 12/06/2016     12/06/2016    CREATININE 0.5 12/06/2016    BUN 6 12/06/2016    CO2 23 12/06/2016    HGBA1C 5.1 12/06/2016         Anesthesia Plan  Type of Anesthesia, risks & benefits discussed:  Anesthesia Type:  CSE  Patient's Preference:   Intra-op Monitoring Plan:   Intra-op Monitoring Plan Comments:   Post Op Pain Control Plan:   Post Op Pain Control Plan Comments:   Induction:    Beta Blocker:         Informed Consent: Patient understands risks and  agrees with Anesthesia plan.  Questions answered. Anesthesia consent signed with patient.  ASA Score: 2     Day of Surgery Review of History & Physical:        Anesthesia Plan Notes: Last meal 7/9/17 at 2pm        Ready For Surgery From Anesthesia Perspective.

## 2017-07-10 NOTE — ANESTHESIA PROCEDURE NOTES
CSE    Patient location during procedure: OB  Start time: 7/10/2017 9:10 AM  Timeout: 7/10/2017 9:09 AM  End time: 7/10/2017 9:30 AM  Staffing  Anesthesiologist: ANAIS HUIZAR  Resident/CRNA: ZONIA BRANCH  Performed: anesthesiologist and resident/CRNA   Preanesthetic Checklist  Completed: patient identified, site marked, surgical consent, pre-op evaluation, timeout performed, IV checked, risks and benefits discussed and monitors and equipment checked  CSE  Patient position: sitting  Prep: ChloraPrep  Patient monitoring: heart rate, cardiac monitor, continuous pulse ox and frequent blood pressure checks  Approach: midline  Spinal Needle  Needle type: pencil-tip   Needle gauge: 25 G  Needle length: 5 in  Epidural Needle  Epidural technique: saline advance flush.  Needle type: Tuohy   Needle gauge: 17 G  Needle length: 3.5 in  Needle insertion depth: 5 cm  Location: L3-4  Needle localization: anatomical landmarks  Catheter  Catheter type: [employer]  Catheter size: 19 G  Catheter at skin depth: 10 cm  Test dose: negative and lidocaine 1.5% with Epi 1-to-200,000  Additional Documentation: incremental injection, negative aspiration for heme, no paresthesia on injection and negative test dose  Assessment  Sensory level: T6   Dermatomal levels determined by pinch or prick  Medications:  Opioid administered: 1.5 mcg of    Intrathecal Medications:  Bolus administered: 1.5 mL of 0.2 ropivacaine  administered: primary anesthetic and 3 mcg of  fentanyl  Epinephrine added: none

## 2017-07-10 NOTE — H&P
UPDATED HISTORY AND PHYSICAL        7/10/2017  Subjective:       Jessi Samson is a 27 y.o.  female with IUP at 39w3d weeks gestation who presents for labor induction    Contractions have been occuring Q10-20  min and have increased in intensity.  This IUP is complicated by genital herpes, on suppressive therapy. No lesions since early pregnancy     Patient denies contractions, denies vaginal bleeding, denies LOF.   Fetal Movement: normal.     PMHx: No past medical history on file.    PSHx: No past surgical history on file.    All: Review of patient's allergies indicates:  No Known Allergies    Meds:   Prescriptions Prior to Admission   Medication Sig Dispense Refill Last Dose    MIRENA 20 mcg/24 hr (5 years) IUD TO BE INSERTED ONE TIME BY PRESCRIBER. ROUTE INTRAUTERINE.  0 Not Taking    oxycodone-acetaminophen (ROXICET) 5-325 mg per tablet Take 2 tablets by mouth every 8 (eight) hours as needed for Pain. 20 tablet 0 Not Taking    prenatal #108-iron,carbonyl-FA 30-1 mg Tab Take by mouth once daily.   Taking    valacyclovir (VALTREX) 1000 MG tablet Take 1 tablet (1,000 mg total) by mouth once daily. 30 tablet 1 Taking       SH:   Social History     Social History    Marital status:      Spouse name: N/A    Number of children: N/A    Years of education: N/A     Occupational History    Not on file.     Social History Main Topics    Smoking status: Never Smoker    Smokeless tobacco: Never Used    Alcohol use No    Drug use: No    Sexual activity: Yes     Partners: Male     Birth control/ protection: None     Other Topics Concern    Not on file     Social History Narrative    No narrative on file       FH: No family history on file.    OBHx:   Obstetric History       T1      L1     SAB0   TAB0   Ectopic0   Multiple0   Live Births1       # Outcome Date GA Lbr Gideon/2nd Weight Sex Delivery Anes PTL Lv   2 Current            1 Term 08/24/10 40w0d   F Vag-Spont EPI  MARIE     "      Review of Systems: Non contributory      Objective:       /65   Pulse 88   Temp 98.5 °F (36.9 °C) (Oral)   Ht 5' 1" (1.549 m)   Wt 77.1 kg (170 lb)   LMP 09/29/2016   SpO2 (!) 79%   Breastfeeding? No   BMI 32.12 kg/m²     Vitals:    07/10/17 0658 07/10/17 0702 07/10/17 0703 07/10/17 0704   BP:  107/65     Pulse: 91 82 84 88   Temp:  98.5 °F (36.9 °C)     TempSrc:  Oral     SpO2: (!) 91%  (!) 83% (!) 79%   Weight:       Height:           General:   alert, appears stated age, cooperative and no distress   Lungs:   clear to auscultation bilaterally   Heart:   regular rate and rhythm, S1, S2 normal, no murmur, click, rub or gallop   Abdomen:  soft, non-tender; bowel sounds normal; no masses,  no organomegaly   Extremities negative edema, negative erythema   FHT: 150 Cat 1 (reassuring)                 TOCO: Q 10 minutes   Presentations: cephalic by ultrasound   Cervix:     Dilation: 5cm    Effacement: 50%    Station:  -2    Consistency: medium    Position: middle         Lab Review  Blood Type O POS  GBBS: negative         Assessment:       39w3d weeks gestation.  Hx genital herpes     Patient Active Problem List   Diagnosis    Back pain                39 weeks gestation of pregnancy          Plan:      Risks, benefits, alternatives and possible complications have been discussed in detail with the patient.   - Consents signed and to chart  - Admit to Labor and Delivery unit  Oxytocin per augmentation protocol     - Epidural per Anesthesia  - Draw CBC, T&S  - Recheck in 2 hrs or PRN            Randy Quiros M.D.   OB/GYN    7/10/2017    "

## 2017-07-11 LAB
BASOPHILS # BLD AUTO: 0.01 K/UL
BASOPHILS # BLD AUTO: 0.02 K/UL
BASOPHILS NFR BLD: 0.1 %
BASOPHILS NFR BLD: 0.1 %
BLD PROD TYP BPU: NORMAL
BLD PROD TYP BPU: NORMAL
BLOOD UNIT EXPIRATION DATE: NORMAL
BLOOD UNIT EXPIRATION DATE: NORMAL
BLOOD UNIT TYPE CODE: 5100
BLOOD UNIT TYPE CODE: 5100
BLOOD UNIT TYPE: NORMAL
BLOOD UNIT TYPE: NORMAL
CODING SYSTEM: NORMAL
CODING SYSTEM: NORMAL
DIFFERENTIAL METHOD: ABNORMAL
DIFFERENTIAL METHOD: ABNORMAL
DISPENSE STATUS: NORMAL
DISPENSE STATUS: NORMAL
EOSINOPHIL # BLD AUTO: 0.1 K/UL
EOSINOPHIL # BLD AUTO: 0.1 K/UL
EOSINOPHIL NFR BLD: 0.3 %
EOSINOPHIL NFR BLD: 0.3 %
ERYTHROCYTE [DISTWIDTH] IN BLOOD BY AUTOMATED COUNT: 16.7 %
ERYTHROCYTE [DISTWIDTH] IN BLOOD BY AUTOMATED COUNT: 16.7 %
HCT VFR BLD AUTO: 32.2 %
HCT VFR BLD AUTO: 32.2 %
HGB BLD-MCNC: 10.6 G/DL
HGB BLD-MCNC: 10.7 G/DL
LYMPHOCYTES # BLD AUTO: 3.1 K/UL
LYMPHOCYTES # BLD AUTO: 3.2 K/UL
LYMPHOCYTES NFR BLD: 15.3 %
LYMPHOCYTES NFR BLD: 16.1 %
MCH RBC QN AUTO: 26.4 PG
MCH RBC QN AUTO: 26.6 PG
MCHC RBC AUTO-ENTMCNC: 32.9 %
MCHC RBC AUTO-ENTMCNC: 33.2 %
MCV RBC AUTO: 80 FL
MCV RBC AUTO: 80 FL
MONOCYTES # BLD AUTO: 1.7 K/UL
MONOCYTES # BLD AUTO: 1.8 K/UL
MONOCYTES NFR BLD: 8.5 %
MONOCYTES NFR BLD: 9.2 %
NEUTROPHILS # BLD AUTO: 14.9 K/UL
NEUTROPHILS # BLD AUTO: 14.9 K/UL
NEUTROPHILS NFR BLD: 74.2 %
NEUTROPHILS NFR BLD: 74.4 %
NUM UNITS TRANS PACKED RBC: NORMAL
PLATELET # BLD AUTO: 223 K/UL
PLATELET # BLD AUTO: 243 K/UL
PMV BLD AUTO: 8.9 FL
PMV BLD AUTO: 9.3 FL
RBC # BLD AUTO: 4.02 M/UL
RBC # BLD AUTO: 4.03 M/UL
TRANS ERYTHROCYTES VOL PATIENT: NORMAL ML
WBC # BLD AUTO: 19.95 K/UL
WBC # BLD AUTO: 20.1 K/UL

## 2017-07-11 PROCEDURE — 36430 TRANSFUSION BLD/BLD COMPNT: CPT

## 2017-07-11 PROCEDURE — 99232 SBSQ HOSP IP/OBS MODERATE 35: CPT | Mod: ,,, | Performed by: OBSTETRICS & GYNECOLOGY

## 2017-07-11 PROCEDURE — 36415 COLL VENOUS BLD VENIPUNCTURE: CPT

## 2017-07-11 PROCEDURE — 11000001 HC ACUTE MED/SURG PRIVATE ROOM

## 2017-07-11 PROCEDURE — 72200005 HC VAGINAL DELIVERY LEVEL II

## 2017-07-11 PROCEDURE — 51702 INSERT TEMP BLADDER CATH: CPT

## 2017-07-11 PROCEDURE — P9016 RBC LEUKOCYTES REDUCED: HCPCS

## 2017-07-11 PROCEDURE — 25000003 PHARM REV CODE 250: Performed by: OBSTETRICS & GYNECOLOGY

## 2017-07-11 PROCEDURE — 85025 COMPLETE CBC W/AUTO DIFF WBC: CPT | Mod: 91

## 2017-07-11 RX ADMIN — NAPROXEN 500 MG: 500 TABLET ORAL at 08:07

## 2017-07-11 RX ADMIN — OXYCODONE HYDROCHLORIDE AND ACETAMINOPHEN 1 TABLET: 10; 325 TABLET ORAL at 10:07

## 2017-07-11 RX ADMIN — OXYCODONE AND ACETAMINOPHEN 1 TABLET: 5; 325 TABLET ORAL at 08:07

## 2017-07-11 RX ADMIN — MISOPROSTOL 200 MCG: 200 TABLET ORAL at 11:07

## 2017-07-11 RX ADMIN — NAPROXEN 500 MG: 500 TABLET ORAL at 05:07

## 2017-07-11 RX ADMIN — MISOPROSTOL 200 MCG: 200 TABLET ORAL at 05:07

## 2017-07-11 RX ADMIN — OXYCODONE AND ACETAMINOPHEN 1 TABLET: 5; 325 TABLET ORAL at 02:07

## 2017-07-11 RX ADMIN — OXYCODONE HYDROCHLORIDE AND ACETAMINOPHEN 1 TABLET: 10; 325 TABLET ORAL at 02:07

## 2017-07-11 RX ADMIN — SODIUM CHLORIDE: 0.9 INJECTION, SOLUTION INTRAVENOUS at 06:07

## 2017-07-11 NOTE — PLAN OF CARE
Problem: Patient Care Overview  Goal: Plan of Care Review  Outcome: Ongoing (interventions implemented as appropriate)   s/p  @ 1941, viable male infant. Pt remains free of falls and trauma, on bedrest due to symptomatic anemia after pp hemorrhage. 1st unit of PRBC infusing at present, no adverse reaction noted. Pt c/o pain 10 improved by PRN percocet. [place] draining to gravity, adequate UO.  Pt is breastfeeding, breast soft/nt. Lochia rub/mod. Fundus f/ml. Bonding appropriately with infant.

## 2017-07-11 NOTE — PROGRESS NOTES
" Jessi Samson is a 27 y.o. female   PPD #1 status post  Spontaneous vaginal delivery.c/w uterine atony and PP hemorrhage    has no problems, feels well, no complaints  Patient reports no abd pain that is well Relieved by Oral pain medications. Lochia is mild and decreasing, Voiding without difficulty Ambulating with no difficulty, has passed flatus, has not had BM,  Patient does plan to breast feed.    Objective:       /71 (BP Location: Right arm, Patient Position: Lying, BP Method: Automatic)   Pulse 86   Temp 98.2 °F (36.8 °C) (Oral)   Resp 18   Ht 5' 1" (1.549 m)   Wt 77.1 kg (170 lb)   LMP 09/29/2016   SpO2 100%   Breastfeeding? Unknown   BMI 32.12 kg/m²   Vitals:    07/11/17 0230 07/11/17 0245 07/11/17 0600 07/11/17 0800   BP: 107/67 103/60 102/66 127/71   BP Location:    Right arm   Patient Position:    Lying   BP Method:    Automatic   Pulse: 60 86 88 86   Resp: 17 18 17 18   Temp: 98.5 °F (36.9 °C) 99 °F (37.2 °C) 99 °F (37.2 °C) 98.2 °F (36.8 °C)   TempSrc: Oral Oral Oral Oral   SpO2: 99% 98% 98% 100%   Weight:       Height:         General:   alert, appears stated age and cooperative   Lungs:   clear to auscultation bilaterally   Heart:   regular rate and rhythm, S1, S2 normal, no murmur, click, rub or gallop   Abdomen:  soft, non-tender; bowel sounds normal; no masses,  no organomegaly   Uterus:  firm located at the umblicus.        Extremities: peripheral pulses normal, no pedal edema, no clubbing or cyanosis     Lab Review  Recent Results (from the past 72 hour(s))   CBC with Auto Differential    Collection Time: 07/09/17  8:27 PM   Result Value Ref Range    WBC 12.41 3.90 - 12.70 K/uL    RBC 3.75 (L) 4.00 - 5.40 M/uL    Hemoglobin 10.1 (L) 12.0 - 16.0 g/dL    Hematocrit 31.3 (L) 37.0 - 48.5 %    MCV 84 82 - 98 fL    MCH 26.9 (L) 27.0 - 31.0 pg    MCHC 32.3 32.0 - 36.0 %    RDW 14.4 11.5 - 14.5 %    Platelets 277 150 - 350 K/uL    MPV 9.4 9.2 - 12.9 fL    Gran # 8.9 (H) 1.8 - 7.7 " K/uL    Lymph # 2.3 1.0 - 4.8 K/uL    Mono # 1.1 (H) 0.3 - 1.0 K/uL    Eos # 0.0 0.0 - 0.5 K/uL    Baso # 0.01 0.00 - 0.20 K/uL    Gran% 71.3 38.0 - 73.0 %    Lymph% 18.4 18.0 - 48.0 %    Mono% 8.5 4.0 - 15.0 %    Eosinophil% 0.3 0.0 - 8.0 %    Basophil% 0.1 0.0 - 1.9 %    Differential Method Automated    Type & Screen    Collection Time: 07/09/17  8:27 PM   Result Value Ref Range    Group & Rh O POS     Indirect Zoila NEG    Prepare RBC 2 Units; PP HEMORRHAGE    Collection Time: 07/09/17  8:27 PM   Result Value Ref Range    UNIT NUMBER [id number]     PRODUCT CODE Q8132W32     DISPENSE STATUS TRANSFUSED     CODING SYSTEM ZVXD449     Unit Blood Type Code 5100     Unit Blood Type O POS     Unit Expiration [id number]     UNIT NUMBER [id number]     PRODUCT CODE R5240G45     DISPENSE STATUS ISSUED     CODING SYSTEM NDPL338     Unit Blood Type Code 5100     Unit Blood Type O POS     Unit Expiration [id number]        I/O    Intake/Output Summary (Last 24 hours) at 07/11/17 0925  Last data filed at 07/11/17 0543   Gross per 24 hour   Intake             2005 ml   Output             3973 ml   Net            -1968 ml        Assessment:     Patient Active Problem List   Diagnosis    Postpartum hemorrhage     Uterine atony             39 weeks gestation of pregnancy        Plan:   1. Patient doing well. Continue routine management and advances.     S/p blood transfusion  2U PRBC's  2. Continue PO pain meds. Pain well controlled.  3. H/h 10.1/31.3.   4. Encourage ambulation.   5- will follow on the H/H today     Randy Quiros M.D.   OB/GYN    7/11/2017

## 2017-07-11 NOTE — PLAN OF CARE
Problem: Patient Care Overview  Goal: Plan of Care Review  Outcome: Ongoing (interventions implemented as appropriate)  Mother will breastfeed on cue at least eight or more times in 24 hours. Will try to breastfeed before offering formula.  Will keep track of feedings and wet and dirty diapers. Will call with any breastfeeding needs.

## 2017-07-11 NOTE — PLAN OF CARE
0700 - assumed care of pt.    0830 - vss, nad, pain well controlled w/po pain meds, light-moderate bleeding, tolerating regular diet.  POC: pain management, cytotec due @ 1100 and 1800, hyrdate, dtv @ 1400, ambulate.  Pt to call nurse or tech when ready to ambulate to bathroom for first void.  Reviewed POc w/pt.  Pt verbalized understanding..

## 2017-07-11 NOTE — ANESTHESIA POSTPROCEDURE EVALUATION
"Anesthesia Post Evaluation    Patient: Jessi Samson    Procedure(s) Performed: * No procedures listed *    Final Anesthesia Type: CSE  Patient location during evaluation: labor & delivery  Patient participation: Yes- Able to Participate  Level of consciousness: awake and alert and oriented  Post-procedure vital signs: reviewed and stable  Pain management: adequate  Airway patency: patent  PONV status at discharge: No PONV  Anesthetic complications: no      Cardiovascular status: blood pressure returned to baseline and hemodynamically stable  Respiratory status: unassisted, spontaneous ventilation and room air  Hydration status: euvolemic  Follow-up not needed.        Visit Vitals  /66   Pulse 88   Temp 37.2 °C (99 °F) (Oral)   Resp 17   Ht 5' 1" (1.549 m)   Wt 77.1 kg (170 lb)   LMP 09/29/2016   SpO2 98%   Breastfeeding? Unknown   BMI 32.12 kg/m²       Pain/Goldie Score: Pain Rating Prior to Med Admin: 5 (7/11/2017  8:26 AM)  Pain Rating Post Med Admin: 0 (7/11/2017  3:24 AM)    No catheter in back  No headache/neckache/backache  Full return of neurological function  Will reassess ability to urinate, peterson removed this AM  "

## 2017-07-11 NOTE — LACTATION NOTE
07/11/17 1045   Maternal Infant Assessment   Breast Size Issue none   Breast Shape pendulous;Bilateral:   Breast Density soft;Bilateral:   Areola elastic   Nipple(s) graspable;retracting;flat;Bilateral:   Nipple Symptoms tender   Infant Assessment   Medical Condition none       Number Scale   Presence of Pain denies  (when breastfeeding)   Location nipple(s)   Maternal Infant Feeding   Maternal Preparation breast care   Maternal Emotional State independent   Presence of Pain no   Latch Assistance no   Breastfeeding Education adequate milk volume;adequate infant intake;milk expression, hand    Following Delivery yes   Breastfeeding History   Currently Breastfeeding no   Breastfeeding History yes   Previous Exclusive Breastfeeding no   Previous Breastfeeding Success unsuccessful   Previous Breastfeeding Problems other (see comments)  (baby wouldn't latch/she pumped)   Infant First Feeding   Infant First Feeding breastfeeding   Breastfeeding Start Date 07/10/17   Breastfeeding Start Time 2030   Skin-to-Skin Contact Following Delivery yes   Feeding Infant   Feeding Tolerance/Success strong suck  (per pt)   Lactation Referrals   Lactation Consult Breastfeeding assessment;Knowledge deficit   Lactation Interventions   Attachment Promotion counseling provided;infant-mother separation minimized;family involvement promoted;face-to-face positioning promoted;role responsibility promoted;skin-to-skin contact encouraged   Breastfeeding Assistance nipple shell utilized;feeding on demand promoted;feeding session observed   Maternal Breastfeeding Support encouragement offered;lactation counseling provided;infant-mother separation minimized   Latch Promotion (discouraged formula)

## 2017-07-11 NOTE — L&D DELIVERY NOTE
Delivery Information for  Prem Samson    Birth information:  YOB: 2018   Time of birth: 7:41 PM   Sex: male   Head Delivery Date/Time: 7/10/2017  7:41 PM   Delivery type: Vaginal, Spontaneous Delivery   Gestational Age: 39w3d    Delivery Providers    Delivering clinician:  Randy Quiros MD   Other personnel:   Provider Role   Carrie Barrios RN Delivery Nurse   Fariha Lugo RN Registered Nurse               [place] Measurements    Weight:  3909 g           [place] Assessment    Living status:  Living  Apgars:     1 Minute:   5 Minute:   10 Minute:   15 Minute:   20 Minute:     Skin Color:   1  1       Heart Rate:   2  2       Reflex Irritability:   2  2       Muscle Tone:   2  2       Respiratory Effort:   2  2       Total:   9  9               Apgars Assigned By:  FARIHA LUGO RN         Assisted Delivery Details:    Forceps attempted?:  No  Vacuum extractor attempted?:  No         Shoulder Dystocia    Shoulder dystocia present?:  No           Presentation and Position    Presentation:   Vertex   Position:       Occiput    Anterior            Interventions/Resuscitation    Method:  Bulb Suctioning, Tactile Stimulation       Cord    Vessels:  3 vessels  Complications:  Nuchal  Nuchal Intervention:  reduced  Nuchal Cord Description:  loose nuchal cord  Number of Loops:  1  Delayed Cord Clamping?:  Yes  Cord Clamped Date/Time:  7/10/2017  7:43 PM  Cord Blood Disposition:  Lab, Sent with Baby  Gases Sent?:  No  Stem Cell Collection (by MD):  No       Placenta    Date and time:  7/10/2017  7:49 PM  Removal:  Spontaneous  Appearance:  Intact  Placenta disposition:  discarded           Labor Events:       labor: No     Labor Onset Date/Time:         Dilation Complete Date/Time: 07/10/2017 19:05     Start Pushing Date/Time: 07/10/2017 19:13     Rupture Date/Time:              Rupture type:           Fluid Amount:        Fluid Color:        Fluid Odor:        Membrane Status  (PeriCalm): ARM (Artificial Rupture)      Rupture Date/Time (PeriCalm): 07/10/2017 12:00:00      Fluid Amount (PeriCalm): Small      Fluid Color (PeriCalm): Clear       steroids: None     Antibiotics given for GBS: No     Induction: oxytocin     Indications for induction:  Elective     Augmentation: amniotomy     Indications for augmentation: Ineffective Contraction Pattern     Labor complications: None     Additional complications:          Cervical ripening:                     Delivery:      Episiotomy: None     Indication for Episiotomy:       Perineal Lacerations: 2nd Repaired:  Yes   Periurethral Laceration: midline Repaired:     Labial Laceration:   Repaired:     Sulcus Laceration:   Repaired:     Vaginal Laceration: Yes Repaired: Yes   Cervical Laceration:   Repaired:     Repair suture:       Repair # of packets: 3     Vaginal delivery QBL (mL): 1556      QBL (mL): 0     Combined Blood Loss (mL): 1556     Vaginal Sweep Performed: Yes     Surgicount Correct: Yes       Other providers:       Anesthesia    Method:  Spinal, Epidural   Analgesics:   Analgesics   STADOL INJ              Details (if applicable):  Trial of Labor      Categorization:      Priority:     Indications for :     Incision Type:       Additional  information:  Forceps:    Vacuum:    Breech:    Observed anomalies    Other (Comments):         DELIVERY NOTE:    After complete dilatation and +2 station  Patient pushes x 15 minutes , delivering    One viable infant sex   ( x )M / (  ) F    , in OA position   Nuchal cord= yes , loose ,reduced   Shoulder dystocia= NO   Apgar 9/9  Amniotic fluid= clear   Placenta= normal intact , complete   Umbilical Cord= 3 vessels     Episiotomy =         Yes(  )  NO  ( x )   Perineal tears =     Yes(  x)  NO  (  ) second degree, midline  Vaginal tears=       Yes(  x)  NO  (  ) lateral, right superficial , linear 3 cms periurethral   EBL = 1500 cc     Delivery  complicated with uterine atony, and uterine inversion .  Uterus is immediately reduced and returned to pelvis with placenta in situ,   Placenta is manually removed intact, and vigorous bimanual massage  of uterus is performed  with one hand in the cavity and Second hand in the abdomen.   Oxytocin 40units  In 500 cc NS IV , , Methergin IM x2  And misoprostol 800 mc grams placed in the rectum   Uterus is massaged for about 10  Minutes, finally regaining its  tone, bleeding improves   Tears are repaired with 2-0 CC   WIll transfuse 2u PRBC's and continue   Misoprostol oral 200 mc grams q 6 hs for 24 hours  Will continue checking uterine tone q 15 minutes for 2 hours   CBC in 4 hours         Randy Quiros M.D.   OB/GYN

## 2017-07-12 VITALS
OXYGEN SATURATION: 97 % | BODY MASS INDEX: 32.1 KG/M2 | SYSTOLIC BLOOD PRESSURE: 95 MMHG | HEART RATE: 74 BPM | DIASTOLIC BLOOD PRESSURE: 55 MMHG | TEMPERATURE: 98 F | RESPIRATION RATE: 18 BRPM | WEIGHT: 170 LBS | HEIGHT: 61 IN

## 2017-07-12 PROCEDURE — 99238 HOSP IP/OBS DSCHRG MGMT 30/<: CPT | Mod: ,,, | Performed by: OBSTETRICS & GYNECOLOGY

## 2017-07-12 RX ORDER — OXYCODONE AND ACETAMINOPHEN 5; 325 MG/1; MG/1
1 TABLET ORAL EVERY 4 HOURS PRN
Qty: 15 TABLET | Refills: 0 | Status: SHIPPED | OUTPATIENT
Start: 2017-07-12 | End: 2017-09-05

## 2017-07-12 RX ORDER — FERROUS SULFATE 325(65) MG
325 TABLET ORAL DAILY
Qty: 30 TABLET | Refills: 1 | Status: SHIPPED | OUTPATIENT
Start: 2017-07-12 | End: 2017-08-08

## 2017-07-12 RX ORDER — NAPROXEN 500 MG/1
500 TABLET ORAL EVERY 8 HOURS PRN
Qty: 30 TABLET | Refills: 0 | Status: SHIPPED | OUTPATIENT
Start: 2017-07-12 | End: 2017-08-08

## 2017-07-12 NOTE — PLAN OF CARE
0700 - assumed care of pt    0800 - vss, nad, denies pain, tolerating regular diet, passing gas, bonding well w/infant.  POC: continue to monitor, d/c home today.  Reviewed POC w/pt.  Pt verbalized understanding.    1530 - reviewed d/c instructions w/pt via language line ID [id number].  D/c rx escripted to pt's preferred pharmacy.  Pt verbalized understanding of d/c instructions and meds.  Pt demonstrates ability to care for herself and for infant.  Pt reports having help at home.  Pt appeared to be bonding well with infant throughout their stay.  VSS, NAD, denied pain throughout the shift, tolerating regular diet, passing gas upon discharge.  Pt d/c'd home with infant in stable condition.  Pt will call when ready for a wheelchair.

## 2017-07-12 NOTE — DISCHARGE SUMMARY
Delivery Discharge Summary  Obstetrics      Primary OB Clinician: Randy Quiros    Admission date: 2017  Discharge date: 2017    Admit Dx:   Patient Active Problem List   Diagnosis    Back pain    30 weeks gestation of pregnancy    Abdominal pain affecting pregnancy    Abdominal pain    39 weeks gestation of pregnancy     Discharge Dx:   Patient Active Problem List   Diagnosis    Back pain    30 weeks gestation of pregnancy    Abdominal pain affecting pregnancy    Abdominal pain    39 weeks gestation of pregnancy       Procedure:     No results for input(s): WBC, RBC, HGB, HCT, PLT, MCV, MCH, MCHC in the last 24 hours.    Hospital Course:  Pt is a 27 y.o. now , PPD # 2 was admitted on 2017 for labor induction   . On initial assessment, vital signs were stable and physical exam was Normal. Infant was in cephalic presentation. Patient was subsequently admitted to labor and delivery unit with signed consents.  Patient delivered a single viable  male. Please see delivery note for further details.Delivery complicated with Uterine atony and Uterine inversion , requiring blood transfusion    Pt was in stable condition post delivery and was transferred to the Mother-Baby Unit. Her postpartum course was uncomplicated. On discharge day, patient's pain is well  controlled with oral pain medications. Pt is tolerating ambulation without SOB or CP, and PO diet without N/V. Reports lochia is minimal in degree. Denies any HA, vision changes, F/C, LE swelling. Denies any breast pain/soreness.  Pt in stable condition and ready for discharge, instructed to continue pain medications  and to follow up in the OB clinic in 4-6 weeks with       Delivery:    Episiotomy: None   Lacerations: 2nd   Repair suture:     Repair # of packets: 3   Blood loss (ml): 1556     Birth information:  YOB: 2018   Time of birth: 7:41 PM   Sex: male   Delivery type: Vaginal, Spontaneous  Delivery   Gestational Age: 39w3d    Delivery Clinician:      Other providers:       Additional  information:  Forceps:    Vacuum:    Breech:    Observed anomalies      Living?:           APGARS  One minute Five minutes Ten minutes   Skin color:         Heart rate:         Grimace:         Muscle tone:         Breathing:         Totals: 9  9        Placenta: Delivered:       appearance      Patient Instructions:   Discharge Medication List as of 7/12/2017  2:25 PM      START taking these medications    Details   ferrous sulfate 325 mg (65 mg iron) Tab tablet Take 1 tablet (325 mg total) by mouth once daily., Starting Wed 7/12/2017, Until Thu 7/12/2018, Normal      naproxen (NAPROSYN) 500 MG tablet Take 1 tablet (500 mg total) by mouth every 8 (eight) hours as needed (Cramping)., Starting Wed 7/12/2017, Normal         CONTINUE these medications which have CHANGED    Details   oxycodone-acetaminophen (PERCOCET) 5-325 mg per tablet Take 1 tablet by mouth every 4 (four) hours as needed., Starting Wed 7/12/2017, Normal         CONTINUE these medications which have NOT CHANGED    Details   MIRENA 20 mcg/24 hr (5 years) IUD TO BE INSERTED ONE TIME BY PRESCRIBER. ROUTE INTRAUTERINE., Historical Med      prenatal #108-iron,carbonyl-FA 30-1 mg Tab Take by mouth once daily., Until Discontinued, Historical Med      valacyclovir (VALTREX) 1000 MG tablet Take 1 tablet (1,000 mg total) by mouth once daily., Starting Sat 6/17/2017, Until Wed 6/28/2017, Normal             Patient is Discharged  home today   General recommendations and alarm signs are given  Pelvic rest   Follow up with Dr Quiros  in  ( )2  -  (x) 4   weeks   Rx sent electronically  To pharmacy on file   All questions answered       Randy Quiros M.D.   OB/GYN  7/13/2017

## 2017-07-12 NOTE — DISCHARGE INSTRUCTIONS
"Patient Discharge Instructions for Postpartum Women    Resume Regular Diet  Increase activity gradually, no heavy lifting  Shower  No tampons, douching or sexual intercourse.  Discuss birth control options with your physician.  Wear a support bra  Return to work/school when you've been cleared by a physician    Call your physician if     *Fever of 100.4 or higher  *Persistent nausea/ vomiting  *Incisional drainage  *Heavy vaginal bleeding or large clots (Heavy bleeding is soaking 1 pad in an hour)  *Swelling and pain in arms or legs  *Severe headaches, blurred vision or fainting  *Shortness of breath  *Frequency and burning with urination  *Signs of postpartum depression, discuss these signs with your physician    Call lactation services for questions regarding feeding, nipple and breast care, and general questions about lactation.  They can be reached at [phone]         Understanding Postpartum Depression    You've just had a baby.  You know you should be excited and happy.  But instead you find yourself crying for no reason.  You may have trouble coping with your daily tasks.  You feel sad, tired, and hopeless most of the time.  You may even feel ashamed or guilty.  But what you're going through is not your fault and you can feel better.  Talk to your doctor.  He or she can help.    Depression After Childbirth    You may be weepy and tired right after giving birth.  These feelings are normal.  They're sometimes called the "baby blues."  These blues go away 2-3 weeks.  However, postpartum (meaning "after birth") depression lasts much longer and is more sever than the "baby blues."  It can make you feel sad and hopeless.  You may also fear that your baby will be harmed and worry about being a bad mother.      What is Depression?    Depression is a mood disorder that affects the way you think and feel.  The most common symptom is a feeling of deep sadness.  You may also feel as if you just can't cope with life.  "   Other symptoms include:      * Gaining or loosing weight  * Sleeping too much or too little  * Feeling tired all the time  * Feeling restless  * Fears of harming your baby   * Lack of interest in your baby  * Feeling worthless or guilty  * No longer finding pleasure in things you used to  * Having trouble thinking clearly or making decisions  * Thoughts of hurting yourself or your baby    What Causes Postpartum Depression    The exact causes of postpartum depression isn't known.  It may be due to changes in your hormones during and after childbirth.  You may also be tired from caring for your baby and adjusting to being a mother.  All these factors may make you feel depressed.  In some cases, your genes may also play a role.    Depression Can Be Treated    The good news is that there are many ways to treat postpartum depression.  Talking to your doctor is the first step toward feeling better.    Resources:    * National [place] of Mental Health  -- 890.759.5367    www.nimh.nih.gov    * National [place] on Mental Illness --595.450.8747    Www.itzel.org    * Mental Health [place] -- 433.740.6275     Www.[facility].org    * National Suicide Hotline --553.869.2539 (800-SUICIDE)    0718-1088 [employer]  All rights reserved.  This information is not intended as a substitute for professional medical care.  Always follow up with your healthcare professional's instructions.

## 2017-07-18 NOTE — PHYSICIAN QUERY
PT Name: Jessi Samson  MR #: [id number]     Physician Query Form - Documentation Clarification      CDS/: Lea Pienda               Contact information: [email or web address]  Query not needed    This form is a permanent document in the medical record.     Query Date: July 18, 2017    By submitting this query, we are merely seeking further clarification of documentation. Please utilize your independent clinical judgment when addressing the question(s) below.    The Medical record reflects the following:    Supporting Clinical Findings Location in Medical Record     This IUP is complicated by genital herpes, on suppressive therapy. No lesions since early pregnancy          H&P                                                                                      Doctor, Please clarify if genital herpes is still under active treatment.    Provider Use Only        (  ) Yes, still active/currently treating  (  ) No, history of/no longer treating  (  ) Other (please specify) _________________________________                                                                                                                       [  ] Clinically undetermined

## 2017-07-18 NOTE — PHYSICIAN QUERY
"xPT Name: Jessi Samson  MR #: [id number]    Physician Query Form - Hematology Clarification      CDS/: Lea Pineda               Contact information: [email or web address]      This form is a permanent document in the medical record.      Query Date: July 18, 2017    By submitting this query, we are merely seeking further clarification of documentation. Please utilize your independent clinical judgment when addressing the question(s) below.    The Medical record contains the following:   Indicators  Supporting Clinical Findings Location in Medical Record   x "Anemia" documented Anemia  Anesthesia report   x H & H = H/h 31.3/10.1 Lab    BP =                     HR=      "GI bleeding" documented     x Acute bleeding (Non GI site) PPD #1 status post  Spontaneous vaginal delivery.c/w uterine atony and PP hemorrhage    PN 7/11   x Transfusion(s) Patient doing well. Continue routine management and advances.      S/p blood transfusion  2U PRBC's    PN 7/11   x Treatment: Transfused 2U Orders    Other:        Provider, please specify diagnosis or diagnoses associated with above clinical findings.    [  ] Acute blood loss anemia expected post-operatively  [X  ] Acute blood loss anemia  [  ] Aplastic anemia  [  ] Iron deficiency anemia  [  ] Chronic blood loss anemia  [  ] Pernicious anemia  [  ] Precipitous drop in Hematocrit  [  ] Other Hematological Diagnosis (please specify): _________________________________    [  ] Clinically Undetermined       Please document in your progress notes daily for the duration of treatment, until resolved, and include in your discharge summary.     Is in the chart that patient had Post partum  Hemorrhage due to uterine atony    Requiring blood transfusion                                                                                                "

## 2017-07-19 PROBLEM — M54.9 BACK PAIN: Status: RESOLVED | Noted: 2017-05-11 | Resolved: 2017-07-19

## 2017-07-19 PROBLEM — Z3A.30 30 WEEKS GESTATION OF PREGNANCY: Status: RESOLVED | Noted: 2017-05-12 | Resolved: 2017-07-19

## 2017-07-19 PROBLEM — O26.899 ABDOMINAL PAIN AFFECTING PREGNANCY: Status: RESOLVED | Noted: 2017-06-18 | Resolved: 2017-07-19

## 2017-07-19 PROBLEM — R10.9 ABDOMINAL PAIN AFFECTING PREGNANCY: Status: RESOLVED | Noted: 2017-06-18 | Resolved: 2017-07-19

## 2017-07-19 PROBLEM — D62 ACUTE BLOOD LOSS ANEMIA: Status: ACTIVE | Noted: 2017-07-19

## 2017-08-08 ENCOUNTER — POSTPARTUM VISIT (OUTPATIENT)
Dept: OBSTETRICS AND GYNECOLOGY | Facility: CLINIC | Age: 27
End: 2017-08-08

## 2017-08-08 VITALS
SYSTOLIC BLOOD PRESSURE: 110 MMHG | BODY MASS INDEX: 29.16 KG/M2 | DIASTOLIC BLOOD PRESSURE: 76 MMHG | WEIGHT: 154.31 LBS

## 2017-08-08 DIAGNOSIS — Z30.430 ENCOUNTER FOR INSERTION OF MIRENA IUD: Primary | ICD-10-CM

## 2017-08-08 PROCEDURE — 58300 INSERT INTRAUTERINE DEVICE: CPT | Mod: S$PBB,,, | Performed by: OBSTETRICS & GYNECOLOGY

## 2017-08-08 PROCEDURE — 99212 OFFICE O/P EST SF 10 MIN: CPT | Mod: PBBFAC,PO | Performed by: OBSTETRICS & GYNECOLOGY

## 2017-08-08 PROCEDURE — 58300 INSERT INTRAUTERINE DEVICE: CPT | Mod: PBBFAC,PO | Performed by: OBSTETRICS & GYNECOLOGY

## 2017-08-08 PROCEDURE — 99999 PR PBB SHADOW E&M-EST. PATIENT-LVL II: CPT | Mod: PBBFAC,,, | Performed by: OBSTETRICS & GYNECOLOGY

## 2017-08-08 RX ORDER — BUPROPION HYDROCHLORIDE 150 MG/1
150 TABLET ORAL EVERY MORNING
Qty: 30 TABLET | Refills: 2 | Status: SHIPPED | OUTPATIENT
Start: 2017-08-08 | End: 2019-08-12

## 2017-08-08 NOTE — PROGRESS NOTES
CC: Post-partum follow-up    Jessi Samson is a 27 y.o. female  who presents for post-partum visit.  She is S/P a .  She and the baby are doing well.  No pain.  No fever.   No bowel / bladder complaints.    Delivery Date: 17  Delivery MD: ivon  Breast Feeding: YES  Depression: YES  Contraception:MirenA       /76   Wt 70 kg (154 lb 5.2 oz)   LMP 2016   Breastfeeding? Yes   BMI 29.16 kg/m²     ROS:  GENERAL: No fever, chills, fatigability.  VULVAR: No pain, no lesions and no itching.  VAGINAL: No relaxation, no itching, no discharge, no abnormal bleeding and no lesions.  ABDOMEN: No abdominal pain. Denies nausea. Denies vomiting. No diarrhea. No constipation  BREAST: Denies pain. No lumps. No discharge.  URINARY: No incontinence, no nocturia, no frequency and no dysuria.  CARDIOVASCULAR: No chest pain. No shortness of breath. No leg cramps.  NEUROLOGICAL: No headaches. No vision changes.    PHYSICAL EXAM:  ABDOMEN:  Soft, non-tender, non-distended  VULVA:  Normal, no lesions  CERVIX:  Without lesions, polyps or tenderness.  UTERUS:  Normal size, shape, consistency, no mass or tenderness.  ADNEXA:  Normal in size without mass or tenderness    IMP:  Doing well S/P   Instructions / precautions reviewed  Contraceptive counseling      PLAN:  May resume normal activities  Return: ONE MONTH TO CHECK iud (see note )   Postpartum depression: Rx for Wellbutrin send       Randy Quiros M.D.   OB/GYN

## 2017-08-08 NOTE — PROGRESS NOTES
CC: IUD PLACEMENT    DATE: 2017    LEANNA Samson is a 27 y.o. female  presents for an IUD placement.  UPT is negative.        PRE-IUD PLACEMENT COUNSELING:  All contraceptive options were reviewed and the patient chooses an IUD.  The patient's history was reviewed and there are no contraindications to an IUD. The procedure and minimal risks of pain, bleeding, perforation and infection at the insertion and spontaneous expulsion within the first two weeks was discussed. The benefits of amenorrhea and no systemic side effects were explained. All questions were answered and the patient agrees to proceed. Consent was signed (scanned into computer).    EXAM:  Uterine Position: anteverted    PROCEDURE:  TIME OUT PERFORMED.  The cervix visualized with a speculum.  A single tooth tenaculum placed on the anterior lip.  The uterus sounded to 8 cm using sterile technique.  A Mirena IUD was loaded and placed high in uterine fundus without difficulty using sterile technique.  The string was cut to 2cm length from exo cervix.  The tenaculum and speculum were removed. The patient tolerated the procedure well.    ASSESSMENT:  1. Contraception management / IUD insertion.V25.0.    POST IUD PLACEMENT COUNSELING:  Manage post IUD placement pain with NSAIDs, Tylenol or Rx per [facility].  IUD danger signs and how to check the strings.  Removal in 5 years for Mirena IUD and in 10 years for Copper IUD.    Counseling lasted approximately 15 minutes and all her questions were answered.    FOLLOW-UP: With me in four weeks.    Randy Quiros M.D.   OB/GYN    2017

## 2017-09-05 ENCOUNTER — OFFICE VISIT (OUTPATIENT)
Dept: OBSTETRICS AND GYNECOLOGY | Facility: CLINIC | Age: 27
End: 2017-09-05

## 2017-09-05 VITALS — BODY MASS INDEX: 29.8 KG/M2 | DIASTOLIC BLOOD PRESSURE: 74 MMHG | WEIGHT: 157.69 LBS | SYSTOLIC BLOOD PRESSURE: 110 MMHG

## 2017-09-05 DIAGNOSIS — Z30.431 IUD CHECK UP: Primary | ICD-10-CM

## 2017-09-05 PROCEDURE — 99213 OFFICE O/P EST LOW 20 MIN: CPT | Mod: S$PBB,,, | Performed by: OBSTETRICS & GYNECOLOGY

## 2017-09-05 PROCEDURE — 3008F BODY MASS INDEX DOCD: CPT | Mod: ,,, | Performed by: OBSTETRICS & GYNECOLOGY

## 2017-09-05 PROCEDURE — 99999 PR PBB SHADOW E&M-EST. PATIENT-LVL II: CPT | Mod: PBBFAC,,, | Performed by: OBSTETRICS & GYNECOLOGY

## 2017-09-05 PROCEDURE — 99212 OFFICE O/P EST SF 10 MIN: CPT | Mod: PBBFAC,PO | Performed by: OBSTETRICS & GYNECOLOGY

## 2017-09-05 NOTE — PROGRESS NOTES
CC: IUD check    Jessi Samson is a 27 y.o. female  presents for IUD check.    Patient had a Mirena placed 17     She is not having problems with bleeding, cramping, fever or discharge.  She is not able to feel the strings.      PHYSICAL EXAM:  Abdomen:  Soft, non-tender, non-distended  Vulva:  No lesions  Vaginal:  No lesions, no abnormal discharge  Cervix: No discharge, no CMT, IUD strings visible at os.  Uterus:  Normal size, non-tender  Adnexa:  No masses, non-tender  Strings cut to 1 cm from os       ASSESSMENT AND PLAN:    IUD CHECK    Patient counseled on IUD danger signs and how to check the strings reviewed.    Return for annual GYN exam    Randy Quiros M.D.   OB/GYN

## 2018-05-30 NOTE — TELEPHONE ENCOUNTER
----- Message from Randy Quiros MD sent at 5/28/2017 11:30 PM CDT -----  GBBS culture is negative  Please inform patient    RTC as scheduled     Randy Quiros M.D.   OB/GYN      
Called and informed patient of results (GBBS culture) patient agreed and verbalized understanding.  
Family

## 2018-06-18 PROBLEM — Z3A.39 39 WEEKS GESTATION OF PREGNANCY: Status: RESOLVED | Noted: 2017-07-09 | Resolved: 2018-06-18

## 2019-05-29 ENCOUNTER — OFFICE VISIT (OUTPATIENT)
Dept: FAMILY MEDICINE | Facility: HOSPITAL | Age: 29
End: 2019-05-29
Attending: FAMILY MEDICINE
Payer: COMMERCIAL

## 2019-05-29 VITALS
BODY MASS INDEX: 32.38 KG/M2 | SYSTOLIC BLOOD PRESSURE: 107 MMHG | DIASTOLIC BLOOD PRESSURE: 69 MMHG | HEIGHT: 61 IN | WEIGHT: 171.5 LBS | HEART RATE: 77 BPM

## 2019-05-29 DIAGNOSIS — K29.00 OTHER ACUTE GASTRITIS WITHOUT HEMORRHAGE: Primary | ICD-10-CM

## 2019-05-29 PROBLEM — D62 ACUTE BLOOD LOSS ANEMIA: Status: RESOLVED | Noted: 2017-07-19 | Resolved: 2019-05-29

## 2019-05-29 PROCEDURE — 99213 OFFICE O/P EST LOW 20 MIN: CPT | Performed by: STUDENT IN AN ORGANIZED HEALTH CARE EDUCATION/TRAINING PROGRAM

## 2019-05-29 NOTE — PROGRESS NOTES
Subjective:       Patient ID: Jessi Samson is a 28 y.o. female.    Chief Complaint: Abdominal Pain    Romansh Interpretor:  [id number]    27 yo female with no past medical history presenting for a 3 week history of nausea, vomiting and abdominal pain. Patient's children also was sick. Patient reports that she has nausea all day every day. Patient reports vomiting 3 times a week and reports yellow color to her vomit. Patient reports worsening pain with fatty foods. Patient denies having the pain before. Patient has been taking Ibuprofen for pain. Patient denies any fever, midepigastric pain, constipation or diarrhea.     Med Hx: Denies  Med: None  Fam Hx: Denies history of family, ovarian, uterine, colon cancer  Social Hx: Denies cigarette smoking, alcohol use, illicit drug use  Surg Hx: Denies  All: NKDA    Gyn Hx:   Last pap: 2016: NILM  No history of abnormal paps    Patient follows up with Dr. Quiros    Review of Systems   Constitutional: Negative for chills, fatigue and fever.   HENT: Negative for rhinorrhea and sinus pain.    Eyes: Negative for photophobia and visual disturbance.   Respiratory: Negative for cough and shortness of breath.    Cardiovascular: Negative for chest pain, palpitations and leg swelling.   Gastrointestinal: Positive for abdominal pain, nausea and vomiting. Negative for constipation and diarrhea.   Endocrine: Negative for polyuria.   Genitourinary: Negative for dysuria, frequency, hematuria and urgency.   Musculoskeletal: Negative for arthralgias and gait problem.   Skin: Negative for rash.   Neurological: Negative for syncope, weakness and headaches.   Psychiatric/Behavioral: Negative for agitation and confusion.       Objective:      Vitals:    05/29/19 0854   BP: 107/69   Pulse: 77     Physical Exam   Constitutional: She is oriented to person, place, and time. She appears well-developed and well-nourished. No distress.   HENT:   Head: Normocephalic and atraumatic.   Eyes: EOM are  normal. Right eye exhibits no discharge. Left eye exhibits no discharge.   Neck: Normal range of motion. No JVD present. No tracheal deviation present. No thyromegaly present.   Cardiovascular: Normal rate, regular rhythm, normal heart sounds and intact distal pulses. Exam reveals no gallop and no friction rub.   No murmur heard.  Pulmonary/Chest: Effort normal and breath sounds normal. No respiratory distress. She has no wheezes. She exhibits no tenderness.   Abdominal: Soft. She exhibits no distension and no mass. There is tenderness. No hernia.   Positive Gordillo's sign   Musculoskeletal: Normal range of motion. She exhibits no edema or deformity.   Neurological: She is alert and oriented to person, place, and time.   Skin: Skin is warm and dry. Capillary refill takes less than 2 seconds. She is not diaphoretic.       Assessment:       1. Other acute gastritis without hemorrhage        Plan:       Other acute gastritis without hemorrhage  -     US Abdomen Complete; Future; Expected date: 05/29/2019  -     POCT Urine Pregnancy  -     CBC auto differential; Future; Expected date: 05/29/2019  -     Comprehensive metabolic panel; Future; Expected date: 05/29/2019  -     Lipid panel; Future; Expected date: 05/29/2019      Follow up in about 1 month (around 6/29/2019). Will call patient regarding US results. If there are signs of cholecystitis, will refer patient to General Surgery. If US is normal, will send patient for H. Pylori testing.

## 2019-05-29 NOTE — PROGRESS NOTES
I have reviewed the notes, assessments, and/or procedures performed, I concur with her/his documentation of Jessi Samson.

## 2019-08-12 ENCOUNTER — OFFICE VISIT (OUTPATIENT)
Dept: FAMILY MEDICINE | Facility: CLINIC | Age: 29
End: 2019-08-12
Payer: COMMERCIAL

## 2019-08-12 VITALS
SYSTOLIC BLOOD PRESSURE: 112 MMHG | WEIGHT: 173.31 LBS | HEIGHT: 62 IN | OXYGEN SATURATION: 98 % | BODY MASS INDEX: 31.89 KG/M2 | DIASTOLIC BLOOD PRESSURE: 70 MMHG | HEART RATE: 89 BPM

## 2019-08-12 DIAGNOSIS — L73.9 FOLLICULITIS: ICD-10-CM

## 2019-08-12 DIAGNOSIS — L65.9 LOSS OF HAIR: ICD-10-CM

## 2019-08-12 DIAGNOSIS — R14.0 ABDOMINAL BLOATING: ICD-10-CM

## 2019-08-12 DIAGNOSIS — R42 DIZZINESS: ICD-10-CM

## 2019-08-12 DIAGNOSIS — R63.8 UNABLE TO LOSE WEIGHT: ICD-10-CM

## 2019-08-12 DIAGNOSIS — G44.229 CHRONIC TENSION-TYPE HEADACHE, NOT INTRACTABLE: ICD-10-CM

## 2019-08-12 DIAGNOSIS — Z00.00 ANNUAL PHYSICAL EXAM: Primary | ICD-10-CM

## 2019-08-12 DIAGNOSIS — H91.93 HEARING DECREASED, BILATERAL: ICD-10-CM

## 2019-08-12 DIAGNOSIS — Z23 NEED FOR DIPHTHERIA-TETANUS-PERTUSSIS (TDAP) VACCINE: ICD-10-CM

## 2019-08-12 DIAGNOSIS — M25.50 ARTHRALGIA, UNSPECIFIED JOINT: ICD-10-CM

## 2019-08-12 DIAGNOSIS — Z12.4 PAP SMEAR FOR CERVICAL CANCER SCREENING: ICD-10-CM

## 2019-08-12 PROCEDURE — 99999 PR PBB SHADOW E&M-EST. PATIENT-LVL IV: CPT | Mod: PBBFAC,,, | Performed by: FAMILY MEDICINE

## 2019-08-12 PROCEDURE — 3008F PR BODY MASS INDEX (BMI) DOCUMENTED: ICD-10-PCS | Mod: CPTII,S$GLB,, | Performed by: FAMILY MEDICINE

## 2019-08-12 PROCEDURE — 3008F BODY MASS INDEX DOCD: CPT | Mod: CPTII,S$GLB,, | Performed by: FAMILY MEDICINE

## 2019-08-12 PROCEDURE — 99214 PR OFFICE/OUTPT VISIT, EST, LEVL IV, 30-39 MIN: ICD-10-PCS | Mod: 25,S$GLB,, | Performed by: FAMILY MEDICINE

## 2019-08-12 PROCEDURE — 90715 TDAP VACCINE 7 YRS/> IM: CPT | Mod: S$GLB,,, | Performed by: FAMILY MEDICINE

## 2019-08-12 PROCEDURE — 90715 TDAP VACCINE GREATER THAN OR EQUAL TO 7YO IM: ICD-10-PCS | Mod: S$GLB,,, | Performed by: FAMILY MEDICINE

## 2019-08-12 PROCEDURE — 99999 PR PBB SHADOW E&M-EST. PATIENT-LVL IV: ICD-10-PCS | Mod: PBBFAC,,, | Performed by: FAMILY MEDICINE

## 2019-08-12 PROCEDURE — 99214 OFFICE O/P EST MOD 30 MIN: CPT | Mod: 25,S$GLB,, | Performed by: FAMILY MEDICINE

## 2019-08-12 PROCEDURE — 90471 TDAP VACCINE GREATER THAN OR EQUAL TO 7YO IM: ICD-10-PCS | Mod: S$GLB,,, | Performed by: FAMILY MEDICINE

## 2019-08-12 PROCEDURE — 90471 IMMUNIZATION ADMIN: CPT | Mod: S$GLB,,, | Performed by: FAMILY MEDICINE

## 2019-08-12 RX ORDER — IBUPROFEN 800 MG/1
800 TABLET ORAL 2 TIMES DAILY PRN
Qty: 60 TABLET | Refills: 0 | Status: SHIPPED | OUTPATIENT
Start: 2019-08-12 | End: 2019-11-25 | Stop reason: SDUPTHER

## 2019-08-12 RX ORDER — MUPIROCIN 20 MG/G
OINTMENT TOPICAL 3 TIMES DAILY
Qty: 30 G | Refills: 1 | Status: ON HOLD | OUTPATIENT
Start: 2019-08-12 | End: 2020-11-16 | Stop reason: HOSPADM

## 2019-08-12 NOTE — PATIENT INSTRUCTIONS
Artralgia [Arthralgia]    Artralgia es el término para definir el dolor en o alrededor de las articulaciones. No es marv enfermedad  sino un síntoma. Puede abarcar marv o más articulaciones. Algunas veces las artralgias se mudan de  articulación en articulación.  Hay muchas causas del dolor de articulaciones. Éstas incluyen:  · Lesión  · Osteoartritis (por el desgaste de la superficie de la articulación)  · Artritis reumatoidea (marv enfermedad auto inmune)  · Gota (inflamación de la articulación por maureen en el líquido de la articulación)  · Infección dentro de la articulación  · Infección en otras partes del cuerpo  · Bursitis (inflamación de los sacos de líquido alrededor de la articulación)  · Lupus y otras enfermedades Colágeno-Vascular  Cuidado En [place]:  1. Descanse la articulación o articulaciones afectadas hasta que mejoren los síntomas.  2. Puede usar acetaminofén (Tylenol) o ibuprofeno (Motrin, Advil) para controlar el dolor, a menos que  3. le receten otro medicamento para el dolor. [NOTA: Si sufre de enfermedad del hígado o riñones, o alguna vez tuvo marv úlcera estomacal o sangrado gastrointestinal, hable con rosales médico antes de usar estos medicamentos.]  Seguimiento  con rosales médico o de acuerdo a lo indicado por nuestro personal.  Busque Prontamente Atención Médica Si Algo De Lo Siguiente Ocurre:  · El dolor aumenta  · El dolor se muda a otras articulaciones  · Aparece marv nueva erupción  · Fiebre de 100.4°F (38°C) o más darin, o louis le haya indicado rosales proveedor de atención médica  Date Last Reviewed: 4/26/2015  © 9181-4215 [employer]. [address] [place] [address], [place] [address]. Todos los derechos reservados. Esta información no pretende sustituir la atención médica profesional. Sólo rosales médico puede diagnosticar y tratar un problema de rickie.

## 2019-08-12 NOTE — PROGRESS NOTES
Subjective:       Patient ID: Jessi Samson is a 29 y.o. female.    Chief Complaint: Establish Care; Headache (on and off); and Abdominal Pain (on and off)    29 years old female came to the clinic for her physical examination.  Patient with episodic abdominal bloating for the last couple months.  Patient previously diagnosed with bilateral hearing loss.  Patient with significant headaches every day using ibuprofen with partial improvement of the symptoms.  Patient with significant stress.  She is due for the Pap smear.  Patient is also reporting dizziness associated with inability to lose weight and hair loss for the last couple of months.  Patient with rash over the genital area after waxing.  Patient due for her tetanus shot.    Review of Systems   Constitutional: Negative.    HENT: Negative.    Eyes: Negative.    Respiratory: Negative.    Cardiovascular: Negative.    Gastrointestinal: Negative.  Negative for abdominal distention, abdominal pain, anal bleeding, blood in stool, constipation, diarrhea, nausea, rectal pain and vomiting.   Genitourinary: Negative.    Musculoskeletal: Negative.    Skin: Negative.    Neurological: Positive for headaches.   Psychiatric/Behavioral: The patient is nervous/anxious.        Objective:      Physical Exam   Constitutional: She is oriented to person, place, and time. She appears well-developed and well-nourished. No distress.   HENT:   Head: Normocephalic and atraumatic.   Right Ear: External ear normal.   Left Ear: External ear normal.   Nose: Nose normal.   Mouth/Throat: Oropharynx is clear and moist. No oropharyngeal exudate.   Eyes: Pupils are equal, round, and reactive to light. Conjunctivae and EOM are normal. Right eye exhibits no discharge. Left eye exhibits no discharge. No scleral icterus.   Neck: Normal range of motion. Neck supple. No JVD present. No tracheal deviation present. No thyromegaly present.   Cardiovascular: Normal rate, regular rhythm, normal  heart sounds and intact distal pulses. Exam reveals no gallop and no friction rub.   No murmur heard.  Pulmonary/Chest: Effort normal and breath sounds normal. No stridor. No respiratory distress. She has no wheezes. She has no rales. She exhibits no tenderness.   Abdominal: Soft. Bowel sounds are normal. She exhibits no distension and no mass. There is no tenderness. There is no rebound and no guarding.   Musculoskeletal: Normal range of motion. She exhibits no edema or tenderness.   Lymphadenopathy:     She has no cervical adenopathy.   Neurological: She is alert and oriented to person, place, and time. She has normal reflexes. She displays normal reflexes. No cranial nerve deficit. She exhibits normal muscle tone. Coordination normal.   Skin: Skin is warm and dry. Rash noted. She is not diaphoretic. There is erythema. No pallor.        Psychiatric: Her behavior is normal. Judgment and thought content normal. Her mood appears anxious. Her affect is not angry, not blunt, not labile and not inappropriate. She does not exhibit a depressed mood.       Assessment:       1. Annual physical exam    2. Abdominal bloating    3. Chronic tension-type headache, not intractable    4. Hearing decreased, bilateral    5. Pap smear for cervical cancer screening    6. Dizziness    7. Loss of hair    8. Unable to lose weight    9. BMI 32.0-32.9,adult    10. Arthralgia, unspecified joint    11. Folliculitis    12. Need for diphtheria-tetanus-pertussis (Tdap) vaccine        Plan:         Jessi was seen today for establish care, headache and abdominal pain.    Diagnoses and all orders for this visit:    Annual physical exam  -     Comprehensive metabolic panel; Future  -     Lipid panel; Future  -     Urinalysis; Future  -     TSH; Future  -     CBC auto differential; Future    Abdominal bloating    Chronic tension-type headache, not intractable  -     Ambulatory referral to Neurology  -     ibuprofen (ADVIL,MOTRIN) 800 MG tablet;  Take 1 tablet (800 mg total) by mouth 2 (two) times daily as needed for Other.    Hearing decreased, bilateral    Pap smear for cervical cancer screening  -     Ambulatory referral to Obstetrics / Gynecology    Dizziness  -     Comprehensive metabolic panel; Future  -     CBC auto differential; Future    Loss of hair    Unable to lose weight  -     Lipid panel; Future  -     TSH; Future    BMI 32.0-32.9,adult  -     Comprehensive metabolic panel; Future  -     Lipid panel; Future  -     TSH; Future  -     naltrexone-bupropion (CONTRAVE) 8-90 mg TbSR; Take 1 tablet by mouth 2 (two) times daily.    Arthralgia, unspecified joint  -     Sedimentation rate; Future  -     C-reactive protein; Future  -     ISELA Screen w/Reflex; Future  -     Rheumatoid factor; Future  -     Uric acid; Future    Folliculitis  -     mupirocin (BACTROBAN) 2 % ointment; Apply topically 3 (three) times daily.    Need for diphtheria-tetanus-pertussis (Tdap) vaccine  -     Tdap Vaccine

## 2019-08-14 ENCOUNTER — LAB VISIT (OUTPATIENT)
Dept: LAB | Facility: HOSPITAL | Age: 29
End: 2019-08-14
Attending: FAMILY MEDICINE
Payer: COMMERCIAL

## 2019-08-14 DIAGNOSIS — R42 DIZZINESS: ICD-10-CM

## 2019-08-14 DIAGNOSIS — R63.8 UNABLE TO LOSE WEIGHT: ICD-10-CM

## 2019-08-14 DIAGNOSIS — M25.50 ARTHRALGIA, UNSPECIFIED JOINT: ICD-10-CM

## 2019-08-14 DIAGNOSIS — Z00.00 ANNUAL PHYSICAL EXAM: ICD-10-CM

## 2019-08-14 LAB
ALBUMIN SERPL BCP-MCNC: 3.9 G/DL (ref 3.5–5.2)
ALP SERPL-CCNC: 73 U/L (ref 55–135)
ALT SERPL W/O P-5'-P-CCNC: 30 U/L (ref 10–44)
ANION GAP SERPL CALC-SCNC: 8 MMOL/L (ref 8–16)
AST SERPL-CCNC: 22 U/L (ref 10–40)
BASOPHILS # BLD AUTO: 0.01 K/UL (ref 0–0.2)
BASOPHILS NFR BLD: 0.1 % (ref 0–1.9)
BILIRUB SERPL-MCNC: 0.4 MG/DL (ref 0.1–1)
BUN SERPL-MCNC: 9 MG/DL (ref 6–20)
CALCIUM SERPL-MCNC: 8.9 MG/DL (ref 8.7–10.5)
CHLORIDE SERPL-SCNC: 108 MMOL/L (ref 95–110)
CHOLEST SERPL-MCNC: 181 MG/DL (ref 120–199)
CHOLEST/HDLC SERPL: 4.8 {RATIO} (ref 2–5)
CO2 SERPL-SCNC: 23 MMOL/L (ref 23–29)
CREAT SERPL-MCNC: 0.6 MG/DL (ref 0.5–1.4)
CRP SERPL-MCNC: 10.6 MG/L (ref 0–8.2)
DIFFERENTIAL METHOD: ABNORMAL
EOSINOPHIL # BLD AUTO: 0.1 K/UL (ref 0–0.5)
EOSINOPHIL NFR BLD: 0.8 % (ref 0–8)
ERYTHROCYTE [DISTWIDTH] IN BLOOD BY AUTOMATED COUNT: 12.5 % (ref 11.5–14.5)
ERYTHROCYTE [SEDIMENTATION RATE] IN BLOOD BY WESTERGREN METHOD: 20 MM/HR (ref 0–36)
EST. GFR  (AFRICAN AMERICAN): >60 ML/MIN/1.73 M^2
EST. GFR  (NON AFRICAN AMERICAN): >60 ML/MIN/1.73 M^2
GLUCOSE SERPL-MCNC: 87 MG/DL (ref 70–110)
HCT VFR BLD AUTO: 39.2 % (ref 37–48.5)
HDLC SERPL-MCNC: 38 MG/DL (ref 40–75)
HDLC SERPL: 21 % (ref 20–50)
HGB BLD-MCNC: 12.5 G/DL (ref 12–16)
IMM GRANULOCYTES # BLD AUTO: 0.03 K/UL (ref 0–0.04)
IMM GRANULOCYTES NFR BLD AUTO: 0.3 % (ref 0–0.5)
LDLC SERPL CALC-MCNC: 99.2 MG/DL (ref 63–159)
LYMPHOCYTES # BLD AUTO: 2.2 K/UL (ref 1–4.8)
LYMPHOCYTES NFR BLD: 24.5 % (ref 18–48)
MCH RBC QN AUTO: 28.4 PG (ref 27–31)
MCHC RBC AUTO-ENTMCNC: 31.9 G/DL (ref 32–36)
MCV RBC AUTO: 89 FL (ref 82–98)
MONOCYTES # BLD AUTO: 0.6 K/UL (ref 0.3–1)
MONOCYTES NFR BLD: 6.9 % (ref 4–15)
NEUTROPHILS # BLD AUTO: 6.1 K/UL (ref 1.8–7.7)
NEUTROPHILS NFR BLD: 67.4 % (ref 38–73)
NONHDLC SERPL-MCNC: 143 MG/DL
NRBC BLD-RTO: 0 /100 WBC
PLATELET # BLD AUTO: 320 K/UL (ref 150–350)
PMV BLD AUTO: 10 FL (ref 9.2–12.9)
POTASSIUM SERPL-SCNC: 4.2 MMOL/L (ref 3.5–5.1)
PROT SERPL-MCNC: 7.1 G/DL (ref 6–8.4)
RBC # BLD AUTO: 4.4 M/UL (ref 4–5.4)
RHEUMATOID FACT SERPL-ACNC: 12 IU/ML (ref 0–15)
SODIUM SERPL-SCNC: 139 MMOL/L (ref 136–145)
TRIGL SERPL-MCNC: 219 MG/DL (ref 30–150)
TSH SERPL DL<=0.005 MIU/L-ACNC: 0.84 UIU/ML (ref 0.4–4)
URATE SERPL-MCNC: 4.1 MG/DL (ref 2.4–5.7)
WBC # BLD AUTO: 9.07 K/UL (ref 3.9–12.7)

## 2019-08-14 PROCEDURE — 86140 C-REACTIVE PROTEIN: CPT

## 2019-08-14 PROCEDURE — 80061 LIPID PANEL: CPT

## 2019-08-14 PROCEDURE — 86038 ANTINUCLEAR ANTIBODIES: CPT

## 2019-08-14 PROCEDURE — 86431 RHEUMATOID FACTOR QUANT: CPT

## 2019-08-14 PROCEDURE — 85025 COMPLETE CBC W/AUTO DIFF WBC: CPT

## 2019-08-14 PROCEDURE — 84550 ASSAY OF BLOOD/URIC ACID: CPT

## 2019-08-14 PROCEDURE — 84443 ASSAY THYROID STIM HORMONE: CPT

## 2019-08-14 PROCEDURE — 36415 COLL VENOUS BLD VENIPUNCTURE: CPT | Mod: PO

## 2019-08-14 PROCEDURE — 80053 COMPREHEN METABOLIC PANEL: CPT

## 2019-08-14 PROCEDURE — 85652 RBC SED RATE AUTOMATED: CPT

## 2019-08-15 LAB — ANA SER QL IF: NORMAL

## 2019-09-19 ENCOUNTER — PATIENT OUTREACH (OUTPATIENT)
Dept: ADMINISTRATIVE | Facility: OTHER | Age: 29
End: 2019-09-19

## 2019-09-20 ENCOUNTER — OFFICE VISIT (OUTPATIENT)
Dept: OBSTETRICS AND GYNECOLOGY | Facility: CLINIC | Age: 29
End: 2019-09-20
Payer: COMMERCIAL

## 2019-09-20 VITALS — DIASTOLIC BLOOD PRESSURE: 72 MMHG | SYSTOLIC BLOOD PRESSURE: 90 MMHG | BODY MASS INDEX: 31.1 KG/M2 | WEIGHT: 167.31 LBS

## 2019-09-20 DIAGNOSIS — Z01.419 ENCOUNTER FOR GYNECOLOGICAL EXAMINATION WITHOUT ABNORMAL FINDING: Primary | ICD-10-CM

## 2019-09-20 DIAGNOSIS — B37.2 SKIN YEAST INFECTION: ICD-10-CM

## 2019-09-20 DIAGNOSIS — Z12.4 CERVICAL CANCER SCREENING: ICD-10-CM

## 2019-09-20 PROCEDURE — 99999 PR PBB SHADOW E&M-EST. PATIENT-LVL III: CPT | Mod: PBBFAC,,, | Performed by: OBSTETRICS & GYNECOLOGY

## 2019-09-20 PROCEDURE — 87481 CANDIDA DNA AMP PROBE: CPT | Mod: 59

## 2019-09-20 PROCEDURE — 99999 PR PBB SHADOW E&M-EST. PATIENT-LVL III: ICD-10-PCS | Mod: PBBFAC,,, | Performed by: OBSTETRICS & GYNECOLOGY

## 2019-09-20 PROCEDURE — 99395 PR PREVENTIVE VISIT,EST,18-39: ICD-10-PCS | Mod: S$GLB,,, | Performed by: OBSTETRICS & GYNECOLOGY

## 2019-09-20 PROCEDURE — 88141 LIQUID-BASED PAP SMEAR, SCREENING: ICD-10-PCS | Mod: ,,, | Performed by: PATHOLOGY

## 2019-09-20 PROCEDURE — 99395 PREV VISIT EST AGE 18-39: CPT | Mod: S$GLB,,, | Performed by: OBSTETRICS & GYNECOLOGY

## 2019-09-20 PROCEDURE — 87624 HPV HI-RISK TYP POOLED RSLT: CPT

## 2019-09-20 PROCEDURE — 88175 CYTOPATH C/V AUTO FLUID REDO: CPT | Performed by: PATHOLOGY

## 2019-09-20 PROCEDURE — 88141 CYTOPATH C/V INTERPRET: CPT | Mod: ,,, | Performed by: PATHOLOGY

## 2019-09-20 PROCEDURE — 87801 DETECT AGNT MULT DNA AMPLI: CPT

## 2019-09-20 RX ORDER — KETOCONAZOLE 20 MG/ML
SHAMPOO, SUSPENSION TOPICAL
Qty: 120 ML | Refills: 4 | Status: ON HOLD | OUTPATIENT
Start: 2019-09-23 | End: 2020-11-16 | Stop reason: CLARIF

## 2019-09-20 RX ORDER — FLUCONAZOLE 150 MG/1
150 TABLET ORAL
Qty: 2 TABLET | Refills: 5 | Status: SHIPPED | OUTPATIENT
Start: 2019-09-20 | End: 2020-01-31

## 2019-09-20 NOTE — LETTER
September 20, 2019      Alexx Apodaca MD  [address]  [place] [address]           [place] [facility]  [address]  [place] [address]  Phone: [phone]          Patient: Jessi Samson   MR Number: [id number]   YOB: 1990   Date of Visit: 9/20/2019       Dear Dr. Alexx Apodaca:    Thank you for referring Jessi Samson to me for evaluation. Attached you will find relevant portions of my assessment and plan of care.    If you have questions, please do not hesitate to call me. I look forward to following Jessi Samson along with you.    Sincerely,    Long Hopkins MD    Enclosure  CC:  No Recipients    If you would like to receive this communication electronically, please contact [email or web address] or [phone] to request more information on [employer] access.    For providers and/or their staff who would like to refer a patient to [facility], please contact us through our one-stop-shop provider referral line, [facility], at [phone].    If you feel you have received this communication in error or would no longer like to receive these types of communications, please e-mail [email or web address]

## 2019-09-21 LAB
BACTERIAL VAGINOSIS DNA: NEGATIVE
CANDIDA GLABRATA DNA: NEGATIVE
CANDIDA KRUSEI DNA: NEGATIVE
CANDIDA RRNA VAG QL PROBE: NEGATIVE
T VAGINALIS RRNA GENITAL QL PROBE: NEGATIVE

## 2019-09-23 ENCOUNTER — TELEPHONE (OUTPATIENT)
Dept: OBSTETRICS AND GYNECOLOGY | Facility: CLINIC | Age: 29
End: 2019-09-23

## 2019-09-23 NOTE — TELEPHONE ENCOUNTER
----- Message from Long Hopkins MD sent at 9/21/2019  5:58 AM CDT -----  Inform patient that her vaginal swabs for STDs was negative for infections.

## 2019-09-24 ENCOUNTER — TELEPHONE (OUTPATIENT)
Dept: OBSTETRICS AND GYNECOLOGY | Facility: CLINIC | Age: 29
End: 2019-09-24

## 2019-09-24 NOTE — TELEPHONE ENCOUNTER
----- Message from Nikki Lori sent at 9/24/2019  2:13 PM CDT -----  Contact: self, [phone]  Patient called in returning your call. Please advise.

## 2019-09-25 LAB
HPV HR 12 DNA CVX QL NAA+PROBE: NEGATIVE
HPV16 AG SPEC QL: NEGATIVE
HPV18 DNA SPEC QL NAA+PROBE: NEGATIVE

## 2019-11-01 ENCOUNTER — PATIENT OUTREACH (OUTPATIENT)
Dept: ADMINISTRATIVE | Facility: OTHER | Age: 29
End: 2019-11-01

## 2019-11-14 ENCOUNTER — TELEPHONE (OUTPATIENT)
Dept: FAMILY MEDICINE | Facility: CLINIC | Age: 29
End: 2019-11-14

## 2019-11-25 DIAGNOSIS — G44.229 CHRONIC TENSION-TYPE HEADACHE, NOT INTRACTABLE: ICD-10-CM

## 2019-11-25 RX ORDER — IBUPROFEN 800 MG/1
800 TABLET ORAL 2 TIMES DAILY PRN
Qty: 60 TABLET | Refills: 0 | Status: SHIPPED | OUTPATIENT
Start: 2019-11-25 | End: 2020-03-27 | Stop reason: SDUPTHER

## 2019-12-13 ENCOUNTER — TELEPHONE (OUTPATIENT)
Dept: FAMILY MEDICINE | Facility: CLINIC | Age: 29
End: 2019-12-13

## 2019-12-13 NOTE — TELEPHONE ENCOUNTER
Spoke with patient and scheduled visit for revaccination of Tdap on 12/19/19 per patient's request.

## 2020-01-31 ENCOUNTER — OFFICE VISIT (OUTPATIENT)
Dept: FAMILY MEDICINE | Facility: CLINIC | Age: 30
End: 2020-01-31
Payer: COMMERCIAL

## 2020-01-31 ENCOUNTER — TELEPHONE (OUTPATIENT)
Dept: FAMILY MEDICINE | Facility: CLINIC | Age: 30
End: 2020-01-31

## 2020-01-31 VITALS
DIASTOLIC BLOOD PRESSURE: 70 MMHG | HEART RATE: 88 BPM | SYSTOLIC BLOOD PRESSURE: 100 MMHG | BODY MASS INDEX: 29.53 KG/M2 | WEIGHT: 160.5 LBS | OXYGEN SATURATION: 99 % | HEIGHT: 62 IN

## 2020-01-31 DIAGNOSIS — M54.42 ACUTE LEFT-SIDED LOW BACK PAIN WITH LEFT-SIDED SCIATICA: ICD-10-CM

## 2020-01-31 DIAGNOSIS — J06.9 UPPER RESPIRATORY TRACT INFECTION, UNSPECIFIED TYPE: Primary | ICD-10-CM

## 2020-01-31 DIAGNOSIS — T69.9XXS: ICD-10-CM

## 2020-01-31 DIAGNOSIS — L65.9 HAIR LOSS DISORDER: ICD-10-CM

## 2020-01-31 DIAGNOSIS — R11.0 NAUSEA: ICD-10-CM

## 2020-01-31 DIAGNOSIS — F33.1 MODERATE EPISODE OF RECURRENT MAJOR DEPRESSIVE DISORDER: ICD-10-CM

## 2020-01-31 LAB
CTP QC/QA: YES
FLUAV AG NPH QL: NEGATIVE
FLUBV AG NPH QL: NEGATIVE

## 2020-01-31 PROCEDURE — 87804 INFLUENZA ASSAY W/OPTIC: CPT | Mod: QW,S$GLB,, | Performed by: FAMILY MEDICINE

## 2020-01-31 PROCEDURE — 99214 PR OFFICE/OUTPT VISIT, EST, LEVL IV, 30-39 MIN: ICD-10-PCS | Mod: 25,S$GLB,, | Performed by: FAMILY MEDICINE

## 2020-01-31 PROCEDURE — 99999 PR PBB SHADOW E&M-EST. PATIENT-LVL III: ICD-10-PCS | Mod: PBBFAC,,, | Performed by: FAMILY MEDICINE

## 2020-01-31 PROCEDURE — 3008F BODY MASS INDEX DOCD: CPT | Mod: CPTII,S$GLB,, | Performed by: FAMILY MEDICINE

## 2020-01-31 PROCEDURE — 87804 POCT INFLUENZA A/B: ICD-10-PCS | Mod: 59,QW,S$GLB, | Performed by: FAMILY MEDICINE

## 2020-01-31 PROCEDURE — 3008F PR BODY MASS INDEX (BMI) DOCUMENTED: ICD-10-PCS | Mod: CPTII,S$GLB,, | Performed by: FAMILY MEDICINE

## 2020-01-31 PROCEDURE — 99999 PR PBB SHADOW E&M-EST. PATIENT-LVL III: CPT | Mod: PBBFAC,,, | Performed by: FAMILY MEDICINE

## 2020-01-31 PROCEDURE — 99214 OFFICE O/P EST MOD 30 MIN: CPT | Mod: 25,S$GLB,, | Performed by: FAMILY MEDICINE

## 2020-01-31 RX ORDER — CYCLOBENZAPRINE HCL 5 MG
5 TABLET ORAL 3 TIMES DAILY PRN
Qty: 30 TABLET | Refills: 0 | Status: SHIPPED | OUTPATIENT
Start: 2020-01-31 | End: 2020-02-10

## 2020-01-31 RX ORDER — SERTRALINE HYDROCHLORIDE 50 MG/1
50 TABLET, FILM COATED ORAL DAILY
Qty: 30 TABLET | Refills: 11 | Status: ON HOLD | OUTPATIENT
Start: 2020-01-31 | End: 2020-11-16 | Stop reason: CLARIF

## 2020-01-31 NOTE — TELEPHONE ENCOUNTER
Called patient, no answer, left Vm to call office. Scheduled same day appt 1/31/20 @ 4:20pm          ----- Message from Nikki Sandoval sent at 1/30/2020  3:41 PM CST -----  Contact: self, [phone]  Patient requests to speak with you, states she's been having back pain and headaches. Appointment scheduled on 3/27 and requested to be seen sooner, placed in waiting list. Please advise.

## 2020-01-31 NOTE — PROGRESS NOTES
Subjective:       Patient ID: Jessi Samson is a 29 y.o. female.    Chief Complaint: Back Pain (on and off) and Headache (on and off)    29 years old female came to the clinic with left lower back pain for the last week.  The pain is 3/10 of intensity on and off aggravated with activity and better with rest.  Patient with muscle aches sometimes and chills.  Both kids with history of common cold.  Patient reports episodic associated headaches and depression.  Patient reports losing hair.  Patient with a BMI of 29 currently trying to lose weight.    Review of Systems   Constitutional: Negative.    HENT: Negative.    Eyes: Negative.    Respiratory: Negative.    Cardiovascular: Negative.    Gastrointestinal: Positive for nausea.   Genitourinary: Negative.    Musculoskeletal: Positive for back pain.   Skin: Negative.    Neurological: Negative.    Psychiatric/Behavioral: Positive for dysphoric mood.       Objective:      Physical Exam   Constitutional: She is oriented to person, place, and time. She appears well-developed and well-nourished. No distress.   HENT:   Head: Normocephalic and atraumatic.   Right Ear: External ear normal.   Left Ear: External ear normal.   Nose: Nose normal.   Mouth/Throat: Oropharynx is clear and moist. No oropharyngeal exudate.   Eyes: Pupils are equal, round, and reactive to light. Conjunctivae and EOM are normal. Right eye exhibits no discharge. Left eye exhibits no discharge. No scleral icterus.   Neck: Normal range of motion. Neck supple. No JVD present. No tracheal deviation present. No thyromegaly present.   Cardiovascular: Normal rate, regular rhythm, normal heart sounds and intact distal pulses. Exam reveals no gallop and no friction rub.   No murmur heard.  Pulmonary/Chest: Effort normal and breath sounds normal. No stridor. No respiratory distress. She has no wheezes. She has no rales. She exhibits no tenderness.   Abdominal: Soft. Bowel sounds are normal. She exhibits no  distension and no mass. There is no tenderness. There is no rebound and no guarding.   Musculoskeletal: Normal range of motion. She exhibits no edema.        Lumbar back: She exhibits tenderness.   Lymphadenopathy:     She has no cervical adenopathy.   Neurological: She is alert and oriented to person, place, and time. She has normal reflexes. No cranial nerve deficit. She exhibits normal muscle tone. Coordination normal.   Skin: Skin is warm and dry. No rash noted. She is not diaphoretic. No erythema. No pallor.   Psychiatric: Her behavior is normal. Judgment and thought content normal. She exhibits a depressed mood.       Assessment:       1. Upper respiratory tract infection, unspecified type    2. BMI 29.0-29.9,adult    3. Hair loss disorder    4. Acute left-sided low back pain with left-sided sciatica    5. Nausea    6. Moderate episode of recurrent major depressive disorder    7. Cold exposure, sequela        Plan:         Jessi was seen today for back pain and headache.    Diagnoses and all orders for this visit:    Upper respiratory tract infection, unspecified type  -     POCT Influenza A/B    BMI 29.0-29.9,adult  -     lorcaserin (BELVIQ) 10 mg Tab; Take 10 mg by mouth 2 (two) times daily.    Hair loss disorder  -     CBC auto differential; Future  -     TSH; Future  -     Basic metabolic panel; Future  -     C-reactive protein; Future  -     Sedimentation rate; Future    Acute left-sided low back pain with left-sided sciatica  -     X-Ray Lumbar Spine Ap And Lateral; Future  -     cyclobenzaprine (FLEXERIL) 5 MG tablet; Take 1 tablet (5 mg total) by mouth 3 (three) times daily as needed.  -     Urinalysis; Future    Nausea    Moderate episode of recurrent major depressive disorder  -     sertraline (ZOLOFT) 50 MG tablet; Take 1 tablet (50 mg total) by mouth once daily.    Cold exposure, sequela  -     POCT Influenza A/B

## 2020-02-01 ENCOUNTER — HOSPITAL ENCOUNTER (OUTPATIENT)
Dept: RADIOLOGY | Facility: HOSPITAL | Age: 30
Discharge: HOME | End: 2020-02-01
Attending: FAMILY MEDICINE
Payer: COMMERCIAL

## 2020-02-01 DIAGNOSIS — M54.42 ACUTE LEFT-SIDED LOW BACK PAIN WITH LEFT-SIDED SCIATICA: ICD-10-CM

## 2020-02-01 PROCEDURE — 72100 X-RAY EXAM L-S SPINE 2/3 VWS: CPT | Mod: 26,,, | Performed by: RADIOLOGY

## 2020-02-01 PROCEDURE — 72100 XR LUMBAR SPINE AP AND LATERAL: ICD-10-PCS | Mod: 26,,, | Performed by: RADIOLOGY

## 2020-02-01 PROCEDURE — 72100 X-RAY EXAM L-S SPINE 2/3 VWS: CPT | Mod: TC,PO

## 2020-03-02 ENCOUNTER — TELEPHONE (OUTPATIENT)
Dept: FAMILY MEDICINE | Facility: CLINIC | Age: 30
End: 2020-03-02

## 2020-03-02 DIAGNOSIS — G44.229 CHRONIC TENSION-TYPE HEADACHE, NOT INTRACTABLE: ICD-10-CM

## 2020-03-02 NOTE — TELEPHONE ENCOUNTER
----- Message from Janet Kelly sent at 3/2/2020  3:59 PM CST -----  Contact: Patient  Patient would like lorcaserin (BELVIQ) 10 mg Tab to be sent to [id number] - [place] - [address] AT [facility]. Prescription was not found at original pharmacy. Please call [phone] to confirm.

## 2020-03-03 NOTE — TELEPHONE ENCOUNTER
Spoke with Janki at [facility], Belviq is no longer available, it has been taken off the market. Please advise, thank you

## 2020-03-03 NOTE — TELEPHONE ENCOUNTER
----- Message from Ophelia Gloria sent at 3/3/2020  8:22 AM CST -----  Contact: Bee foy/ [facility] [phone]   Type:  Pharmacy Calling to Clarify an RX    Name of Caller:Bee   Pharmacy Name:[facility]   Prescription Name:lorcaserin (BELVIQ) 10 mg Tab   What do they need to clarify?:was pulled off the market last week   Best Call Back Number:[phone]  Additional Information:

## 2020-03-27 ENCOUNTER — OFFICE VISIT (OUTPATIENT)
Dept: FAMILY MEDICINE | Facility: CLINIC | Age: 30
End: 2020-03-27
Payer: COMMERCIAL

## 2020-03-27 VITALS — WEIGHT: 160 LBS | BODY MASS INDEX: 29.74 KG/M2

## 2020-03-27 DIAGNOSIS — R31.9 HEMATURIA, UNSPECIFIED TYPE: ICD-10-CM

## 2020-03-27 DIAGNOSIS — G44.229 CHRONIC TENSION-TYPE HEADACHE, NOT INTRACTABLE: Primary | ICD-10-CM

## 2020-03-27 DIAGNOSIS — M54.50 CHRONIC BILATERAL LOW BACK PAIN WITHOUT SCIATICA: ICD-10-CM

## 2020-03-27 DIAGNOSIS — G89.29 CHRONIC BILATERAL LOW BACK PAIN WITHOUT SCIATICA: ICD-10-CM

## 2020-03-27 PROCEDURE — 99214 OFFICE O/P EST MOD 30 MIN: CPT | Mod: 95,,, | Performed by: FAMILY MEDICINE

## 2020-03-27 PROCEDURE — 99214 PR OFFICE/OUTPT VISIT, EST, LEVL IV, 30-39 MIN: ICD-10-PCS | Mod: 95,,, | Performed by: FAMILY MEDICINE

## 2020-03-27 PROCEDURE — 3008F BODY MASS INDEX DOCD: CPT | Mod: CPTII,,, | Performed by: FAMILY MEDICINE

## 2020-03-27 PROCEDURE — 3008F PR BODY MASS INDEX (BMI) DOCUMENTED: ICD-10-PCS | Mod: CPTII,,, | Performed by: FAMILY MEDICINE

## 2020-03-27 RX ORDER — IBUPROFEN 800 MG/1
800 TABLET ORAL 2 TIMES DAILY PRN
Qty: 60 TABLET | Refills: 1 | Status: SHIPPED | OUTPATIENT
Start: 2020-03-27 | End: 2020-07-13 | Stop reason: SDUPTHER

## 2020-03-30 NOTE — PROGRESS NOTES
29 years old female was evaluated by a telemedicine.  Clinical appointment was done by synchronous audio and video at my office.  Patient was at her home. Follow-up appointment for headaches .She reports episodic headache for the last year.  She is using ibuprofen with significant improvement.  Patient with good compliance with the depression medicine.  No suicidal or homicidal ideations.  Last urine with evidence of blood trace.  She denies dysuria, frequency, urgency or flank pain.  Patient denies previous kidney workup.  No history of kidney stone.  Patient also with chronic back pain for the last year.  The pain is 3/10 of intensity on and off aggravated with activity better with rest.  She is requesting a medicine to help her with her weight.  Patient with a BMI of 29.  Answers for HPI/ROS submitted by the patient on 3/27/2020   activity change: No  unexpected weight change: Yes  neck pain: No  hearing loss: No  rhinorrhea: No  trouble swallowing: No  eye discharge: No  visual disturbance: No  chest tightness: No  wheezing: No  chest pain: No  palpitations: No  blood in stool: No  constipation: No  vomiting: No  diarrhea: No  polydipsia: No  polyuria: No  difficulty urinating: No  hematuria: No  menstrual problem: No  dysuria: No  joint swelling: No  arthralgias: No  headaches: No  weakness: No  confusion: No  dysphoric mood: No

## 2020-04-27 ENCOUNTER — TELEPHONE (OUTPATIENT)
Dept: FAMILY MEDICINE | Facility: CLINIC | Age: 30
End: 2020-04-27

## 2020-04-27 NOTE — TELEPHONE ENCOUNTER
I have called the patient to reschedule her appointment that was scheduled for Thursday, there has been no answer. I will reschedule the appointment and send out a new appointment reminder in the mail.

## 2020-06-15 RX ORDER — NALTREXONE HYDROCHLORIDE AND BUPROPION HYDROCHLORIDE 8; 90 MG/1; MG/1
1 TABLET, EXTENDED RELEASE ORAL 2 TIMES DAILY
COMMUNITY
End: 2020-06-15 | Stop reason: SDUPTHER

## 2020-06-15 RX ORDER — NALTREXONE HYDROCHLORIDE AND BUPROPION HYDROCHLORIDE 8; 90 MG/1; MG/1
1 TABLET, EXTENDED RELEASE ORAL 2 TIMES DAILY
Qty: 60 TABLET | Refills: 1 | Status: SHIPPED | OUTPATIENT
Start: 2020-06-15 | End: 2020-07-13 | Stop reason: SDUPTHER

## 2020-06-15 NOTE — TELEPHONE ENCOUNTER
----- Message from Ophelia Gloria sent at 6/15/2020  1:45 PM CDT -----  Contact: [phone]/Self  Type:  RX Refill Request    Who Called: pt  Refill or New Rx:Refill   RX Name and Strength:naltrexone-bupropion (CONTRAVE) 8-90 mg TbSR  How is the patient currently taking it? (ex. 1XDay):Take 1 tablet by mouth 2 (two) times daily  Is this a 30 day or 90 day RX:90  Preferred Pharmacy with phone number:[facility] [address]  Local or Mail Order:Local   Ordering Provider:Dr. Apodaca   Would the patient rather a call back or a response via [facility]? Call back   Best Call Back Number:[phone]  Additional Information:

## 2020-07-13 ENCOUNTER — OFFICE VISIT (OUTPATIENT)
Dept: FAMILY MEDICINE | Facility: CLINIC | Age: 30
End: 2020-07-13
Payer: COMMERCIAL

## 2020-07-13 ENCOUNTER — HOSPITAL ENCOUNTER (OUTPATIENT)
Dept: RADIOLOGY | Facility: HOSPITAL | Age: 30
Discharge: HOME | End: 2020-07-13
Attending: FAMILY MEDICINE
Payer: COMMERCIAL

## 2020-07-13 VITALS
TEMPERATURE: 98 F | WEIGHT: 166.69 LBS | DIASTOLIC BLOOD PRESSURE: 78 MMHG | BODY MASS INDEX: 30.98 KG/M2 | OXYGEN SATURATION: 98 % | HEART RATE: 91 BPM | SYSTOLIC BLOOD PRESSURE: 110 MMHG

## 2020-07-13 DIAGNOSIS — G44.229 CHRONIC TENSION-TYPE HEADACHE, NOT INTRACTABLE: ICD-10-CM

## 2020-07-13 DIAGNOSIS — M53.3 SACRAL BACK PAIN: ICD-10-CM

## 2020-07-13 DIAGNOSIS — K59.01 SLOW TRANSIT CONSTIPATION: Primary | ICD-10-CM

## 2020-07-13 PROCEDURE — 3008F BODY MASS INDEX DOCD: CPT | Mod: CPTII,S$GLB,, | Performed by: FAMILY MEDICINE

## 2020-07-13 PROCEDURE — 99214 PR OFFICE/OUTPT VISIT, EST, LEVL IV, 30-39 MIN: ICD-10-PCS | Mod: S$GLB,,, | Performed by: FAMILY MEDICINE

## 2020-07-13 PROCEDURE — 3008F PR BODY MASS INDEX (BMI) DOCUMENTED: ICD-10-PCS | Mod: CPTII,S$GLB,, | Performed by: FAMILY MEDICINE

## 2020-07-13 PROCEDURE — 72220 X-RAY EXAM SACRUM TAILBONE: CPT | Mod: 26,,, | Performed by: RADIOLOGY

## 2020-07-13 PROCEDURE — 99999 PR PBB SHADOW E&M-EST. PATIENT-LVL IV: CPT | Mod: PBBFAC,,, | Performed by: FAMILY MEDICINE

## 2020-07-13 PROCEDURE — 72220 XR SACRUM AND COCCYX: ICD-10-PCS | Mod: 26,,, | Performed by: RADIOLOGY

## 2020-07-13 PROCEDURE — 99214 OFFICE O/P EST MOD 30 MIN: CPT | Mod: S$GLB,,, | Performed by: FAMILY MEDICINE

## 2020-07-13 PROCEDURE — 99999 PR PBB SHADOW E&M-EST. PATIENT-LVL IV: ICD-10-PCS | Mod: PBBFAC,,, | Performed by: FAMILY MEDICINE

## 2020-07-13 PROCEDURE — 72220 X-RAY EXAM SACRUM TAILBONE: CPT | Mod: TC,FY,PO

## 2020-07-13 RX ORDER — IBUPROFEN 800 MG/1
800 TABLET ORAL 2 TIMES DAILY PRN
Qty: 60 TABLET | Refills: 1 | Status: SHIPPED | OUTPATIENT
Start: 2020-07-13 | End: 2020-11-06 | Stop reason: SDUPTHER

## 2020-07-13 RX ORDER — NALTREXONE HYDROCHLORIDE AND BUPROPION HYDROCHLORIDE 8; 90 MG/1; MG/1
1 TABLET, EXTENDED RELEASE ORAL 2 TIMES DAILY
Qty: 60 TABLET | Refills: 1 | Status: ON HOLD | OUTPATIENT
Start: 2020-07-13 | End: 2020-11-16 | Stop reason: CLARIF

## 2020-07-13 RX ORDER — CYCLOBENZAPRINE HCL 5 MG
5 TABLET ORAL 3 TIMES DAILY PRN
Qty: 30 TABLET | Refills: 0 | Status: SHIPPED | OUTPATIENT
Start: 2020-07-13 | End: 2020-07-23

## 2020-07-13 NOTE — PROGRESS NOTES
Subjective:       Patient ID: Jessi Samson is a 30 y.o. female.    Chief Complaint: Follow-up, Constipation (2 days), and Back Pain (for two weeks pt fell on her buttocks and has been in pain)    30 years old female came to the clinic with a recent fall last week.  Patient with a contusion over the sacral coccygeal area.  The pain is 7/10 of intensity on and off aggravated with palpation and better with rest.  Patient was using ibuprofen with partial improvement of the symptoms.  Patient also with significant constipation for the last month.  Patient is willing to start Metamucil at home but is requesting a medicine to help her with the symptoms.  Patient with a BMI of 30 currently trying to lose weight.    Review of Systems   Constitutional: Negative.    HENT: Negative.    Eyes: Negative.    Respiratory: Negative.    Gastrointestinal: Positive for constipation.   Genitourinary: Negative.    Musculoskeletal: Positive for back pain.   Neurological: Positive for headaches.   Psychiatric/Behavioral: Negative.          Objective:      Physical Exam  Constitutional:       General: She is not in acute distress.     Appearance: She is well-developed. She is not diaphoretic.   HENT:      Head: Normocephalic and atraumatic.      Right Ear: External ear normal.      Left Ear: External ear normal.      Nose: Nose normal.      Mouth/Throat:      Pharynx: No oropharyngeal exudate.   Eyes:      General: No scleral icterus.        Right eye: No discharge.         Left eye: No discharge.      Conjunctiva/sclera: Conjunctivae normal.      Pupils: Pupils are equal, round, and reactive to light.   Neck:      Musculoskeletal: Normal range of motion and neck supple.      Thyroid: No thyromegaly.      Vascular: No JVD.      Trachea: No tracheal deviation.   Cardiovascular:      Rate and Rhythm: Normal rate and regular rhythm.      Heart sounds: Normal heart sounds. No murmur. No friction rub. No gallop.    Pulmonary:       Effort: Pulmonary effort is normal. No respiratory distress.      Breath sounds: Normal breath sounds. No stridor. No wheezing or rales.   Chest:      Chest wall: No tenderness.   Abdominal:      General: Bowel sounds are normal. There is no distension.      Palpations: Abdomen is soft. There is no mass.      Tenderness: There is no abdominal tenderness. There is no guarding or rebound.   Musculoskeletal: Normal range of motion.      Lumbar back: She exhibits tenderness, bony tenderness, pain and spasm.   Lymphadenopathy:      Cervical: No cervical adenopathy.   Skin:     General: Skin is warm and dry.      Coloration: Skin is not pale.      Findings: No erythema or rash.   Neurological:      Mental Status: She is alert and oriented to person, place, and time.      Cranial Nerves: No cranial nerve deficit.      Motor: No abnormal muscle tone.      Coordination: Coordination normal.      Deep Tendon Reflexes: Reflexes are normal and symmetric.   Psychiatric:         Behavior: Behavior normal.         Thought Content: Thought content normal.         Judgment: Judgment normal.         Assessment:       1. Slow transit constipation    2. Sacral back pain    3. BMI 30.0-30.9,adult    4. Chronic tension-type headache, not intractable        Plan:         Jessi was seen today for follow-up, constipation and back pain.    Diagnoses and all orders for this visit:    Slow transit constipation  -     linaCLOtide (LINZESS) 72 mcg Cap capsule; Take 1 capsule (72 mcg total) by mouth before breakfast.    Sacral back pain  -     X-Ray Sacrum And Coccyx; Future  -     cyclobenzaprine (FLEXERIL) 5 MG tablet; Take 1 tablet (5 mg total) by mouth 3 (three) times daily as needed.    BMI 30.0-30.9,adult  -     naltrexone-bupropion (CONTRAVE) 8-90 mg TbSR; Take 1 tablet by mouth 2 (two) times daily.    Chronic tension-type headache, not intractable  -     ibuprofen (ADVIL,MOTRIN) 800 MG tablet; Take 1 tablet (800 mg total) by mouth 2  (two) times daily as needed for Pain.

## 2020-08-27 ENCOUNTER — TELEPHONE (OUTPATIENT)
Dept: FAMILY MEDICINE | Facility: CLINIC | Age: 30
End: 2020-08-27

## 2020-08-27 ENCOUNTER — OFFICE VISIT (OUTPATIENT)
Dept: FAMILY MEDICINE | Facility: CLINIC | Age: 30
End: 2020-08-27
Payer: COMMERCIAL

## 2020-08-27 VITALS
SYSTOLIC BLOOD PRESSURE: 112 MMHG | DIASTOLIC BLOOD PRESSURE: 80 MMHG | HEART RATE: 96 BPM | OXYGEN SATURATION: 100 % | WEIGHT: 160.94 LBS | TEMPERATURE: 97 F | HEIGHT: 62 IN | BODY MASS INDEX: 29.62 KG/M2

## 2020-08-27 DIAGNOSIS — Z20.822 SUSPECTED COVID-19 VIRUS INFECTION: Primary | ICD-10-CM

## 2020-08-27 DIAGNOSIS — R68.83 CHILLS: ICD-10-CM

## 2020-08-27 DIAGNOSIS — R11.0 NAUSEA: ICD-10-CM

## 2020-08-27 DIAGNOSIS — R51.9 INTRACTABLE HEADACHE, UNSPECIFIED CHRONICITY PATTERN, UNSPECIFIED HEADACHE TYPE: ICD-10-CM

## 2020-08-27 LAB
CTP QC/QA: YES
FLUAV AG NPH QL: NEGATIVE
FLUBV AG NPH QL: NEGATIVE

## 2020-08-27 PROCEDURE — U0003 INFECTIOUS AGENT DETECTION BY NUCLEIC ACID (DNA OR RNA); SEVERE ACUTE RESPIRATORY SYNDROME CORONAVIRUS 2 (SARS-COV-2) (CORONAVIRUS DISEASE [COVID-19]), AMPLIFIED PROBE TECHNIQUE, MAKING USE OF HIGH THROUGHPUT TECHNOLOGIES AS DESCRIBED BY CMS-2020-01-R: HCPCS

## 2020-08-27 PROCEDURE — 3008F PR BODY MASS INDEX (BMI) DOCUMENTED: ICD-10-PCS | Mod: CPTII,S$GLB,, | Performed by: NURSE PRACTITIONER

## 2020-08-27 PROCEDURE — 99213 PR OFFICE/OUTPT VISIT, EST, LEVL III, 20-29 MIN: ICD-10-PCS | Mod: 25,S$GLB,, | Performed by: NURSE PRACTITIONER

## 2020-08-27 PROCEDURE — 99213 OFFICE O/P EST LOW 20 MIN: CPT | Mod: 25,S$GLB,, | Performed by: NURSE PRACTITIONER

## 2020-08-27 PROCEDURE — 87804 POCT INFLUENZA A/B: ICD-10-PCS | Mod: 59,QW,S$GLB, | Performed by: NURSE PRACTITIONER

## 2020-08-27 PROCEDURE — 99999 PR PBB SHADOW E&M-EST. PATIENT-LVL IV: ICD-10-PCS | Mod: PBBFAC,,, | Performed by: NURSE PRACTITIONER

## 2020-08-27 PROCEDURE — 3008F BODY MASS INDEX DOCD: CPT | Mod: CPTII,S$GLB,, | Performed by: NURSE PRACTITIONER

## 2020-08-27 PROCEDURE — 99999 PR PBB SHADOW E&M-EST. PATIENT-LVL IV: CPT | Mod: PBBFAC,,, | Performed by: NURSE PRACTITIONER

## 2020-08-27 PROCEDURE — 87804 INFLUENZA ASSAY W/OPTIC: CPT | Mod: QW,S$GLB,, | Performed by: NURSE PRACTITIONER

## 2020-08-27 RX ORDER — ONDANSETRON HYDROCHLORIDE 8 MG/1
8 TABLET, FILM COATED ORAL EVERY 8 HOURS PRN
Qty: 30 TABLET | Refills: 0 | Status: ON HOLD | OUTPATIENT
Start: 2020-08-27 | End: 2020-11-16 | Stop reason: CLARIF

## 2020-08-27 NOTE — TELEPHONE ENCOUNTER
----- Message from Gila Candelaria sent at 8/27/2020  8:26 AM CDT -----  Contact: SELF [phone]  Patient would like to speak with you about being seen today for headaches. Please advise.

## 2020-08-27 NOTE — PATIENT INSTRUCTIONS
Departamento de [facility] [place]  Prevenir la propagación del Coronoavirus 2019 en hogares y comunidades residenciales      Pasos preventivos para personas con COVID-19 o que se sospecha que están infectadas (incluidas personas bajo investigación) que no necesitan estar hospitalizadas y personas infectadas con COVID-19 que hayan estado hospitalizadas marlen que se determinan medicamente estables para irse a casa.    Rosales médico y el personal de sanidad pública evaluarán si usted puede ser tratado en casa.   Quédese en casa excepto para obtener cuidados médicos.   Sepárese de otras personas y animales en rosales hogar.   Llame por teléfono antes de ir a heri a rosales médico.   Póngase marv mascarilla.   Tápese al toser y estornudar.   Lávese las scot con frecuencia.   Evite compartir objetos personales en rosales hogar.   Limpie todas las superficies a rosales alcance todos los días.    Monitoree shelia síntomas. Consiga ayuda médica si la enfermedad empeora (por ejemplo, dificultad para respirar). Antes de salir a buscar ayuda, llame a rosales proveedor médico.    Si tiene marv emergencia médica y necesita llamar al 911, notifique al personal que responda a rosales llamada de que usted tiene, o está siendo evaluado para COVID-19. Si es posible, póngase marv mascarilla antes de que la ayuda sanitaria llegue.   Dejar de hacer aislamiento en casa. Llame a rosales médico para que le asesore sobre si puede dejar de hacer aislamiento en casa.    Precauciones recomendadas para miembros del mismo hogar, compañeros íntimos y cuidadores, fuera del ámbito médico, de un paciente sintomático confirmado positivo para COVID-19 o un paciente que está siendo investigado.  Miembros del mismo hogar, compañeros íntimos y cuidadores, fuera del ámbito médico, pueden debby estado en contacto con marv persona con síntomas confirmada positivo para COVID-19 o marv persona bajo investigación. Personas con contacto cercano deberían monitorizar rosales rickie; deberían  llamar a rosales proveedor médico inmediatamente si desarrollan síntomas que sugieren que puede debby contraído COVID-19 (por ejemplo, fiebre, tos, falta de aliento).    Personas con contacto cercano deberían, además, seguir las siguientes recomendaciones:   Asegúrese de que usted puede entender y ayudar al paciente a seguir las instrucciones de rosales proveedor médico, en relación con la medicación y el cuidado a seguir. Debería ayudar al paciente con shelia necesidades básicas en casa y ayudar cuando sea necesario a hacer la compra, ir a recoger las recetas médicas y otras necesidades personales.   Monitorear los síntomas del paciente. Si el paciente empeora, llame a rosales proveedor médico y dígale que el paciente snow sido confirmado positivo para COVID-19. [place] ayudará a la oficina de rosales médico a jovita las medidas adecuadas para evitar que otras personas en la oficina o en la jefferson de espera se infecten. Pida al proveedor médico que llame al departamento de rickie del estado para más instrucciones. Si el paciente tiene marv emergencia médica y tiene que llamar al 911, informe al operador que responda a rosales llamada de que el paciente tiene o está siendo evaluado para COVID-19.   Miembros del mismo hogar deberían quedarse en habitaciones separadas del paciente lo gary posible. Miembros del mismo hogar deberían utilizar otro dormitorio y cuarto de baño, si fuera posible.    Prohibir la visita de cualquier persona que no necesite estar en la casa.   Miembros del mismo hogar deberían ocuparse de las mascotas de la casa. No cuide de animales o mascotas mientras esté enfermo.   Asegúrese de que las áreas comunes de la casa tienen buena ventilación, louis aire acondicionado o marv ventana que se pueda abrir si el clima lo permite.   Lávese las scot frecuentemente. Lávese las scot frecuentemente con jabón y agua christian al menos 20 segundos o utilice un desinfectante de scot con base de alcohol, que contenga entre 60 y 95% de  alcohol. Cubriendo todas las superficies de las scot y frotándolas juntas hasta que se sequen.   Evite tocarse los ojos, la nariz y la boca antes de haberse lavado las scot.   El paciente debería llevar mascarilla cuando esté con otras personas. Si el paciente no se puede poner marv mascarilla porque, por ejemplo, tiene dificultad para respirar, usted, louis cuidador, debería ponerse marv mascarilla cuando esté en la misma habitación que el paciente.   Utilice marv mascarilla desechable y guantes cuando toque o esté en contacto con la ashlee del paciente, shelia heces, shelia fluidos corporales tales louis saliva, esputo, mucosidad nasal, vómito, orina.   o Tire las mascarillas desechables y los guantes evangelist pues de utilizarlos. No los reutilice.  o Cuando se quite la protección, keo quítese los guantes. Inmediatamente después lávese las scot con agua y jabón o con un desinfectante de scot con base de alcohol. Después quítese y tire la mascarilla, e inmediatamente después lávese las scot otra vez con agua y jabón o utilice un desinfectante de scot con base de alcohol.   Evite compartir objetos del hogar con el paciente. No debería compartir platos, vasos, tazas, cubiertos, toallas, ropa de cama u otros objetos. Después de que el paciente utilice estos objetos debe lavarlos minuciosamente (ethan debajo Adam la colada minuciosamente).   Limpie todas las superficies de fácil alcance, louis las encimeras, las mesas, los pomos de las sneha, los picaportes del baño, el retrete, teléfonos, teclados, tabletas y las mesillas de noche, todos los días. Además, lave cualquier superficie que pueda tener ashlee, heces o fluidos corporales.   Utilice los productos de limpieza o las toallitas según indiquen las etiquetas de los mismos. Las etiquetas contienen la información para utilizar estos productos de manera haynes y eficiente, además de las precauciones que debe jovita al utilizar dichos productos, tales louis el uso de  guantes o buena ventilación.   Lave la colada minuciosamente.  o Retire inmediatamente cualquier prenda o ropa de cama que tenga ashlee, heces o fluidos corporales.  o Utilice guantes desechables cuando toque objetos sucios y separe los objetos sucios de rosales cuerpo. Lávese las scot (con jabón y agua o con un desinfectante de scot con base de alcohol) inmediatamente después de quitarse los guantes.  o Julia y siga las instrucciones en las etiquetas de la ropa y el detergente. En general, utilice un detergente corriente siguiendo las instrucciones de la lavadora y séquelo meticulosamente utilizando la temperatura mas darin recomendada en la etiqueta de la ropa.   Coloque todos los guantes desechables, las mascarillas y otros objetos contaminados en un contenedor reforzado antes de tirarlo junto con otros desechos del hogar. Lávese las scot (con jabón y agua o con un desinfectante de scot con base de alcohol) inmediatamente después de tocar estos objetos. Si las scot están visiblemente sucias se recomienda lavarlas con agua y con jabón.    Consulte cualquier otra pregunta con rosales departamento de rickie local o estatal o con rosales proveedor médico. Compruebe las horas en las que se puede contactar al departamento de rickie local.    Para más información, siga el enlace del CDC que encontrará a continuación.    https://www.cdc.gov/coronavirus/2019-ncov/hcp/guidance-prevent-spread-sp.html

## 2020-08-27 NOTE — PROGRESS NOTES
Subjective:       Patient ID: Jessi Samson is a 30 y.o. female.    Chief Complaint: Headache ( x 2 days) and Nausea (on and off)    This is a 31 yo  female patient of Dr. Ricardo who is new to me. She presents today with c/o flu-like symptoms x past 2 days that have gradually worsened since onset. No significant past medical history. Patient reports subjective fever of 99 degrees F (axillary), chills, fatigue, nasal congestion, nausea, vomiting, sore throat, loss of appetite and pounding headaches for the past two days. Reports headache intensified yesterday to the point she had to miss work. Experiencing photophobia and phonophobia. Denies dyspnea, SOB, or chest pain. Has tried taking previously prescribed ibuprofen 800mg that offers little to no relief. Says her children had COVID-19 symptoms and were swabbed last week; says they tested negative. Patient will be swabbed for influenza and COVID-19 today due to symptoms. Remainder of visit to be done by phone. See more below.  Headache   This is a new problem. The current episode started in the past 7 days. The problem occurs constantly. The problem has been unchanged. The pain is located in the bilateral region. The pain does not radiate. The pain quality is similar to prior headaches. The quality of the pain is described as pulsating and band-like. The pain is moderate. Associated symptoms include a fever, muscle aches, nausea, phonophobia, photophobia, rhinorrhea, a sore throat and vomiting. Pertinent negatives include no blurred vision, dizziness, ear pain, numbness, visual change or weakness. The symptoms are aggravated by bright light, noise and activity. She has tried NSAIDs, darkened room and cold packs for the symptoms. The treatment provided mild relief.     Review of Systems   Constitutional: Positive for appetite change, chills, fatigue and fever.   HENT: Positive for nasal congestion, rhinorrhea and sore throat. Negative for ear pain.     Eyes: Positive for photophobia. Negative for blurred vision and visual disturbance.   Respiratory: Negative for chest tightness and shortness of breath.    Cardiovascular: Negative for chest pain and palpitations.   Gastrointestinal: Positive for nausea and vomiting.   Genitourinary: Negative for difficulty urinating and hematuria.   Musculoskeletal: Positive for myalgias. Negative for gait problem.   Integumentary:  Negative for rash.   Neurological: Positive for headaches. Negative for dizziness, weakness and numbness.         Objective:      Physical Exam  Constitutional:       General: She is not in acute distress.     Appearance: Normal appearance. She is well-developed, well-groomed and overweight.   HENT:      Head: Normocephalic.      Nose: Mucosal edema, congestion and rhinorrhea present.      Mouth/Throat:      Pharynx: Uvula midline. No pharyngeal swelling.   Eyes:      General:         Right eye: No discharge.         Left eye: No discharge.   Cardiovascular:      Rate and Rhythm: Normal rate.   Pulmonary:      Effort: Pulmonary effort is normal. No respiratory distress.      Breath sounds: No wheezing.   Abdominal:      General: Bowel sounds are normal. There is no distension.      Palpations: Abdomen is soft.   Musculoskeletal:      Right lower leg: No edema.      Left lower leg: No edema.   Skin:     General: Skin is warm and dry.      Capillary Refill: Capillary refill takes less than 2 seconds.   Neurological:      Mental Status: She is alert and oriented to person, place, and time.      Coordination: Coordination normal.      Gait: Gait normal.   Psychiatric:         Attention and Perception: Attention normal.         Mood and Affect: Mood and affect normal.         Speech: Speech normal.         Behavior: Behavior normal. Behavior is cooperative.         Assessment:       1. Suspected Covid-19 Virus Infection    2. Chills    3. Nausea    4. Intractable headache, unspecified chronicity pattern,  unspecified headache type        Plan:       Jessi was seen today for headache and nausea.    Diagnoses and all orders for this visit:    Suspected Covid-19 Virus Infection  -     COVID-19 Home Symptom Monitoring  - Duration (days): 14    Chills  -     POCT Influenza A/B  -     COVID-19 Routine Screening    Nausea  -     ondansetron (ZOFRAN) 8 MG tablet; Take 1 tablet (8 mg total) by mouth every 8 (eight) hours as needed for Nausea.  -     POCT Influenza A/B  -     COVID-19 Routine Screening    Intractable headache, unspecified chronicity pattern, unspecified headache type      - Flu and COVID-19 swabs ordered today: Flu A/B negative; COVID-19 pending (may take 3-5 days to result)  - Given clear instructions on importance of quarantining, cleaning high-touch surfaces and hand washing  - Rx treatment for nausea sent to preferred pharmacy  - Notified that she was enrolled in COVID-19 Home Symptom Monitoring Program  - Warning signs discussed  - Letter provided for work  - Schedule virtual visit follow-up as needed

## 2020-08-27 NOTE — LETTER
"             [facility]     [address]  [place] [address]  Phone: [phone]  Fax: [phone]      August 27, 2020    Patient: Jessi Samson (Lorena)   YOB: 1990   Date of Visit: 8/27/2020       To Whom It May Concern:    Jessi Samson was seen by me on 8/27/2020. She was tested for COVID-19 and will need to remain under quarantine until she receives her result, as this is recommended by the CDC to prevent the spread of the virus.      If you have any questions or concerns, or if I can be of further assistance, please do not hesitate to contact me.    Sincerely,         Natalie Jung, MSN, APRN, FNP-C     "

## 2020-08-28 ENCOUNTER — TELEPHONE (OUTPATIENT)
Dept: FAMILY MEDICINE | Facility: CLINIC | Age: 30
End: 2020-08-28

## 2020-08-28 LAB — SARS-COV-2 RNA RESP QL NAA+PROBE: NOT DETECTED

## 2020-08-28 NOTE — TELEPHONE ENCOUNTER
Spoke with patient to discuss test results. Verbalized understanding. No other questions at this time.    Natalie Jung NP

## 2020-08-28 NOTE — TELEPHONE ENCOUNTER
----- Message from Janet Kelly sent at 8/28/2020 12:32 PM CDT -----  Contact: SHARAD JOHN [[id number]]  Type:  Patient Returning Call    Who Called: Sharad  Who Left Message for Patient: Unknown  Does the patient know what this is regarding?: test results?  Would the patient rather a call back or a response via [employer]?  Call back   Best Call Back Number: [phone]  Additional Information:  none

## 2020-09-02 ENCOUNTER — TELEPHONE (OUTPATIENT)
Dept: FAMILY MEDICINE | Facility: CLINIC | Age: 30
End: 2020-09-02

## 2020-09-02 NOTE — TELEPHONE ENCOUNTER
----- Message from Ramin Kay MA sent at 9/2/2020  1:21 PM CDT -----  Regarding: FW: call back  Contact: [phone]    ----- Message -----  From: Shandra Donato  Sent: 9/2/2020  12:36 PM CDT  To: Paulie PEREZ Staff  Subject: call back                                        Patient is calling to get a copy of her test results of her recent COVI19 test to bring to her employer and also requesting a doctors note to return back to work this Friday. Please advice

## 2020-09-03 ENCOUNTER — TELEPHONE (OUTPATIENT)
Dept: FAMILY MEDICINE | Facility: CLINIC | Age: 30
End: 2020-09-03

## 2020-09-03 NOTE — TELEPHONE ENCOUNTER
----- Message from Gila Candelaria sent at 9/3/2020  9:15 AM CDT -----  Contact: SELF [phone]  Patient would like to speak with you about needing a return to work letter and her negative covid-19 results Please advise

## 2020-09-14 ENCOUNTER — HOSPITAL ENCOUNTER (INPATIENT)
Facility: HOSPITAL | Age: 30
LOS: 2 days | Discharge: HOME | DRG: 872 | End: 2020-09-16
Attending: EMERGENCY MEDICINE | Admitting: FAMILY MEDICINE
Payer: COMMERCIAL

## 2020-09-14 DIAGNOSIS — R78.81 BACTEREMIA DUE TO GRAM-NEGATIVE BACTERIA: Primary | ICD-10-CM

## 2020-09-14 DIAGNOSIS — R00.0 TACHYCARDIA: ICD-10-CM

## 2020-09-14 PROBLEM — A41.50 SEPSIS DUE TO GRAM-NEGATIVE UTI: Status: ACTIVE | Noted: 2020-09-14

## 2020-09-14 PROBLEM — N39.0 SEPSIS DUE TO GRAM-NEGATIVE UTI: Status: ACTIVE | Noted: 2020-09-14

## 2020-09-14 PROBLEM — R50.9 FEVER: Status: ACTIVE | Noted: 2020-09-14

## 2020-09-14 LAB
ALBUMIN SERPL BCP-MCNC: 4.2 G/DL (ref 3.5–5.2)
ALP SERPL-CCNC: 97 U/L (ref 55–135)
ALT SERPL W/O P-5'-P-CCNC: 51 U/L (ref 10–44)
ANION GAP SERPL CALC-SCNC: 8 MMOL/L (ref 8–16)
AST SERPL-CCNC: 31 U/L (ref 10–40)
BASOPHILS # BLD AUTO: 0.02 K/UL (ref 0–0.2)
BASOPHILS NFR BLD: 0.3 % (ref 0–1.9)
BILIRUB SERPL-MCNC: 0.4 MG/DL (ref 0.1–1)
BUN SERPL-MCNC: 6 MG/DL (ref 6–20)
CALCIUM SERPL-MCNC: 9.5 MG/DL (ref 8.7–10.5)
CHLORIDE SERPL-SCNC: 103 MMOL/L (ref 95–110)
CO2 SERPL-SCNC: 28 MMOL/L (ref 23–29)
CREAT SERPL-MCNC: 0.8 MG/DL (ref 0.5–1.4)
DIFFERENTIAL METHOD: NORMAL
EOSINOPHIL # BLD AUTO: 0 K/UL (ref 0–0.5)
EOSINOPHIL NFR BLD: 0.4 % (ref 0–8)
ERYTHROCYTE [DISTWIDTH] IN BLOOD BY AUTOMATED COUNT: 12.4 % (ref 11.5–14.5)
EST. GFR  (AFRICAN AMERICAN): >60 ML/MIN/1.73 M^2
EST. GFR  (NON AFRICAN AMERICAN): >60 ML/MIN/1.73 M^2
GLUCOSE SERPL-MCNC: 122 MG/DL (ref 70–110)
HCT VFR BLD AUTO: 37.9 % (ref 37–48.5)
HGB BLD-MCNC: 12.5 G/DL (ref 12–16)
IMM GRANULOCYTES # BLD AUTO: 0.02 K/UL (ref 0–0.04)
IMM GRANULOCYTES NFR BLD AUTO: 0.3 % (ref 0–0.5)
LACTATE SERPL-SCNC: 1 MMOL/L (ref 0.5–2.2)
LYMPHOCYTES # BLD AUTO: 2 K/UL (ref 1–4.8)
LYMPHOCYTES NFR BLD: 25.1 % (ref 18–48)
MCH RBC QN AUTO: 28.6 PG (ref 27–31)
MCHC RBC AUTO-ENTMCNC: 33 G/DL (ref 32–36)
MCV RBC AUTO: 87 FL (ref 82–98)
MONOCYTES # BLD AUTO: 0.7 K/UL (ref 0.3–1)
MONOCYTES NFR BLD: 8.4 % (ref 4–15)
NEUTROPHILS # BLD AUTO: 5.2 K/UL (ref 1.8–7.7)
NEUTROPHILS NFR BLD: 65.5 % (ref 38–73)
NRBC BLD-RTO: 0 /100 WBC
PLATELET # BLD AUTO: 313 K/UL (ref 150–350)
PMV BLD AUTO: 9.2 FL (ref 9.2–12.9)
POTASSIUM SERPL-SCNC: 4 MMOL/L (ref 3.5–5.1)
PROT SERPL-MCNC: 8 G/DL (ref 6–8.4)
RBC # BLD AUTO: 4.37 M/UL (ref 4–5.4)
SARS-COV-2 RDRP RESP QL NAA+PROBE: NEGATIVE
SODIUM SERPL-SCNC: 139 MMOL/L (ref 136–145)
WBC # BLD AUTO: 7.98 K/UL (ref 3.9–12.7)

## 2020-09-14 PROCEDURE — 85025 COMPLETE CBC W/AUTO DIFF WBC: CPT

## 2020-09-14 PROCEDURE — 25000003 PHARM REV CODE 250: Performed by: PHYSICIAN ASSISTANT

## 2020-09-14 PROCEDURE — 96361 HYDRATE IV INFUSION ADD-ON: CPT

## 2020-09-14 PROCEDURE — 25000003 PHARM REV CODE 250: Performed by: NURSE PRACTITIONER

## 2020-09-14 PROCEDURE — 83605 ASSAY OF LACTIC ACID: CPT

## 2020-09-14 PROCEDURE — U0002 COVID-19 LAB TEST NON-CDC: HCPCS

## 2020-09-14 PROCEDURE — 63600175 PHARM REV CODE 636 W HCPCS: Performed by: EMERGENCY MEDICINE

## 2020-09-14 PROCEDURE — 94761 N-INVAS EAR/PLS OXIMETRY MLT: CPT

## 2020-09-14 PROCEDURE — 99285 EMERGENCY DEPT VISIT HI MDM: CPT | Mod: 25

## 2020-09-14 PROCEDURE — 11000001 HC ACUTE MED/SURG PRIVATE ROOM

## 2020-09-14 PROCEDURE — 96374 THER/PROPH/DIAG INJ IV PUSH: CPT

## 2020-09-14 PROCEDURE — 80053 COMPREHEN METABOLIC PANEL: CPT

## 2020-09-14 PROCEDURE — 93005 ELECTROCARDIOGRAM TRACING: CPT

## 2020-09-14 RX ORDER — CEFEPIME HYDROCHLORIDE 1 G/50ML
2 INJECTION, SOLUTION INTRAVENOUS
Status: DISCONTINUED | OUTPATIENT
Start: 2020-09-15 | End: 2020-09-15

## 2020-09-14 RX ORDER — SODIUM CHLORIDE 9 MG/ML
INJECTION, SOLUTION INTRAVENOUS CONTINUOUS
Status: DISCONTINUED | OUTPATIENT
Start: 2020-09-14 | End: 2020-09-15

## 2020-09-14 RX ORDER — CEFEPIME HYDROCHLORIDE 1 G/50ML
2 INJECTION, SOLUTION INTRAVENOUS
Status: COMPLETED | OUTPATIENT
Start: 2020-09-14 | End: 2020-09-14

## 2020-09-14 RX ORDER — SERTRALINE HYDROCHLORIDE 50 MG/1
50 TABLET, FILM COATED ORAL DAILY
Status: DISCONTINUED | OUTPATIENT
Start: 2020-09-15 | End: 2020-09-14

## 2020-09-14 RX ORDER — ACETAMINOPHEN 325 MG/1
650 TABLET ORAL EVERY 6 HOURS PRN
Status: DISCONTINUED | OUTPATIENT
Start: 2020-09-14 | End: 2020-09-16 | Stop reason: HOSPADM

## 2020-09-14 RX ORDER — ONDANSETRON 2 MG/ML
4 INJECTION INTRAMUSCULAR; INTRAVENOUS EVERY 6 HOURS PRN
Status: DISCONTINUED | OUTPATIENT
Start: 2020-09-14 | End: 2020-09-16 | Stop reason: HOSPADM

## 2020-09-14 RX ORDER — PHENAZOPYRIDINE HYDROCHLORIDE 100 MG/1
100 TABLET, FILM COATED ORAL
Status: DISCONTINUED | OUTPATIENT
Start: 2020-09-15 | End: 2020-09-16 | Stop reason: HOSPADM

## 2020-09-14 RX ORDER — TALC
6 POWDER (GRAM) TOPICAL NIGHTLY PRN
Status: DISCONTINUED | OUTPATIENT
Start: 2020-09-14 | End: 2020-09-16 | Stop reason: HOSPADM

## 2020-09-14 RX ADMIN — SODIUM CHLORIDE: 0.9 INJECTION, SOLUTION INTRAVENOUS at 11:09

## 2020-09-14 RX ADMIN — SODIUM CHLORIDE: 0.9 INJECTION, SOLUTION INTRAVENOUS at 09:09

## 2020-09-14 RX ADMIN — ACETAMINOPHEN 650 MG: 325 TABLET ORAL at 09:09

## 2020-09-14 RX ADMIN — SODIUM CHLORIDE, SODIUM LACTATE, POTASSIUM CHLORIDE, AND CALCIUM CHLORIDE 1000 ML: .6; .31; .03; .02 INJECTION, SOLUTION INTRAVENOUS at 08:09

## 2020-09-14 RX ADMIN — SODIUM CHLORIDE 1000 ML: 0.9 INJECTION, SOLUTION INTRAVENOUS at 07:09

## 2020-09-14 RX ADMIN — CEFEPIME HYDROCHLORIDE 2 G: 2 INJECTION, SOLUTION INTRAVENOUS at 08:09

## 2020-09-14 NOTE — LETTER
September 16, 2020                 [facility]  [address]  [place]  [address]  Phone: [phone]  Fax: [phone] September 16, 2020     Patient: Jessi Samson   YOB: 1990       To Whom It May Concern:    Jessi Samsno was admitted to the hospital on 9/14/2020  7:20 PM and discharged on .  She may return with no restrictions on 9/21/20.  If you have any questions or concerns, please don't hesitate to call my office at [phone].      Sincerely,        Rahul Roberts MD  Department of Hospital Medicine

## 2020-09-14 NOTE — FIRST PROVIDER EVALUATION
Emergency Department TeleTriage Encounter Note      CHIEF COMPLAINT    Chief Complaint   Patient presents with    Abnormal Lab     pt was seen here yesterday and received a call today to come back to be admitted because she had positive blood cultures. c/o continued left flank pain.        VITAL SIGNS   Initial Vitals [09/14/20 1827]   BP Pulse Resp Temp SpO2   123/80 (!) 111 20 99.8 °F (37.7 °C) 96 %      MAP       --            ALLERGIES    Review of patient's allergies indicates:  No Known Allergies    PROVIDER TRIAGE NOTE  Patient is a 30 y.o. female presenting for admission with positive blood cultures. Called today and informed. She was seen yesterday, diagnosed with cystitis, given levaquin. Patient still feels poorly. Covid negative yesterday       ORDERS  Labs Reviewed   CBC W/ AUTO DIFFERENTIAL   COMPREHENSIVE METABOLIC PANEL   LACTIC ACID, PLASMA       ED Orders (720h ago, onward)    Start Ordered     Status Ordering Provider    09/14/20 1845 09/14/20 1835  sodium chloride 0.9% bolus 1,000 mL  ED 1 Time      Ordered MACEY RAMON    09/14/20 1836 09/14/20 1835  EKG 12-lead  Once      Ordered TAYEH, MACEY    09/14/20 1836 09/14/20 1835  Lactic acid, plasma  STAT  Collect    Ordered TAYEH, MACEY    09/14/20 1835 09/14/20 1835  CBC auto differential  STAT  Collect    Ordered MACEY RAMON    09/14/20 1835 09/14/20 1835  Comprehensive metabolic panel  STAT  Collect    Ordered TAYMACEY MANRIQUE    09/14/20 1835 09/14/20 1835  Saline lock IV  Once      Ordered MOUNIKA RAMONOLE            Virtual Visit Note: The provider triage portion of this emergency department evaluation and documentation was performed via [employer], a HIPAA-compliant telemedicine application, in concert with a tele-presenter in the room. A face to face patient evaluation with one of my colleagues will occur once the patient is placed in an emergency department room.      DISCLAIMER: This note was prepared with M*Modal voice  recognition transcription software. Garbled syntax, mangled pronouns, and other bizarre constructions may be attributed to that software system.

## 2020-09-15 PROBLEM — B96.20 E COLI BACTEREMIA: Status: ACTIVE | Noted: 2020-09-14

## 2020-09-15 PROBLEM — R10.9 FLANK PAIN: Status: ACTIVE | Noted: 2020-09-15

## 2020-09-15 PROBLEM — N39.0 COMPLICATED UTI (URINARY TRACT INFECTION): Status: ACTIVE | Noted: 2020-09-15

## 2020-09-15 PROBLEM — B00.9 HERPES INFECTION: Status: ACTIVE | Noted: 2020-09-15

## 2020-09-15 PROBLEM — R78.81 GRAM-NEGATIVE BACTEREMIA: Status: ACTIVE | Noted: 2020-09-14

## 2020-09-15 LAB
ALBUMIN SERPL BCP-MCNC: 3.4 G/DL (ref 3.5–5.2)
ALP SERPL-CCNC: 80 U/L (ref 55–135)
ALT SERPL W/O P-5'-P-CCNC: 43 U/L (ref 10–44)
ANION GAP SERPL CALC-SCNC: 5 MMOL/L (ref 8–16)
AST SERPL-CCNC: 28 U/L (ref 10–40)
BASOPHILS # BLD AUTO: 0.01 K/UL (ref 0–0.2)
BASOPHILS NFR BLD: 0.1 % (ref 0–1.9)
BILIRUB SERPL-MCNC: 0.3 MG/DL (ref 0.1–1)
BUN SERPL-MCNC: 7 MG/DL (ref 6–20)
CALCIUM SERPL-MCNC: 8.2 MG/DL (ref 8.7–10.5)
CHLORIDE SERPL-SCNC: 107 MMOL/L (ref 95–110)
CO2 SERPL-SCNC: 25 MMOL/L (ref 23–29)
CREAT SERPL-MCNC: 0.6 MG/DL (ref 0.5–1.4)
DIFFERENTIAL METHOD: ABNORMAL
EOSINOPHIL # BLD AUTO: 0.1 K/UL (ref 0–0.5)
EOSINOPHIL NFR BLD: 1.1 % (ref 0–8)
ERYTHROCYTE [DISTWIDTH] IN BLOOD BY AUTOMATED COUNT: 12.5 % (ref 11.5–14.5)
EST. GFR  (AFRICAN AMERICAN): >60 ML/MIN/1.73 M^2
EST. GFR  (NON AFRICAN AMERICAN): >60 ML/MIN/1.73 M^2
GLUCOSE SERPL-MCNC: 103 MG/DL (ref 70–110)
HCT VFR BLD AUTO: 34.1 % (ref 37–48.5)
HGB BLD-MCNC: 11.3 G/DL (ref 12–16)
IMM GRANULOCYTES # BLD AUTO: 0.02 K/UL (ref 0–0.04)
IMM GRANULOCYTES NFR BLD AUTO: 0.2 % (ref 0–0.5)
LYMPHOCYTES # BLD AUTO: 3.2 K/UL (ref 1–4.8)
LYMPHOCYTES NFR BLD: 38.8 % (ref 18–48)
MAGNESIUM SERPL-MCNC: 1.7 MG/DL (ref 1.6–2.6)
MCH RBC QN AUTO: 28.9 PG (ref 27–31)
MCHC RBC AUTO-ENTMCNC: 33.1 G/DL (ref 32–36)
MCV RBC AUTO: 87 FL (ref 82–98)
MONOCYTES # BLD AUTO: 0.9 K/UL (ref 0.3–1)
MONOCYTES NFR BLD: 10.4 % (ref 4–15)
NEUTROPHILS # BLD AUTO: 4.1 K/UL (ref 1.8–7.7)
NEUTROPHILS NFR BLD: 49.4 % (ref 38–73)
NRBC BLD-RTO: 0 /100 WBC
PHOSPHATE SERPL-MCNC: 3.1 MG/DL (ref 2.7–4.5)
PLATELET # BLD AUTO: 272 K/UL (ref 150–350)
PMV BLD AUTO: 9 FL (ref 9.2–12.9)
POTASSIUM SERPL-SCNC: 3.7 MMOL/L (ref 3.5–5.1)
PROT SERPL-MCNC: 6.5 G/DL (ref 6–8.4)
RBC # BLD AUTO: 3.91 M/UL (ref 4–5.4)
SODIUM SERPL-SCNC: 137 MMOL/L (ref 136–145)
WBC # BLD AUTO: 8.2 K/UL (ref 3.9–12.7)

## 2020-09-15 PROCEDURE — 36415 COLL VENOUS BLD VENIPUNCTURE: CPT

## 2020-09-15 PROCEDURE — 11000001 HC ACUTE MED/SURG PRIVATE ROOM

## 2020-09-15 PROCEDURE — 25000003 PHARM REV CODE 250: Performed by: NURSE PRACTITIONER

## 2020-09-15 PROCEDURE — 83735 ASSAY OF MAGNESIUM: CPT

## 2020-09-15 PROCEDURE — 87040 BLOOD CULTURE FOR BACTERIA: CPT

## 2020-09-15 PROCEDURE — 63600175 PHARM REV CODE 636 W HCPCS: Performed by: NURSE PRACTITIONER

## 2020-09-15 PROCEDURE — 25000003 PHARM REV CODE 250: Performed by: HOSPITALIST

## 2020-09-15 PROCEDURE — 94761 N-INVAS EAR/PLS OXIMETRY MLT: CPT

## 2020-09-15 PROCEDURE — 85025 COMPLETE CBC W/AUTO DIFF WBC: CPT

## 2020-09-15 PROCEDURE — 63600175 PHARM REV CODE 636 W HCPCS: Performed by: HOSPITALIST

## 2020-09-15 PROCEDURE — 84100 ASSAY OF PHOSPHORUS: CPT

## 2020-09-15 PROCEDURE — 80053 COMPREHEN METABOLIC PANEL: CPT

## 2020-09-15 RX ORDER — VALACYCLOVIR HYDROCHLORIDE 500 MG/1
2000 TABLET, FILM COATED ORAL 2 TIMES DAILY
Status: DISCONTINUED | OUTPATIENT
Start: 2020-09-15 | End: 2020-09-15

## 2020-09-15 RX ORDER — CEFEPIME HYDROCHLORIDE 1 G/50ML
2 INJECTION, SOLUTION INTRAVENOUS
Status: DISCONTINUED | OUTPATIENT
Start: 2020-09-15 | End: 2020-09-15

## 2020-09-15 RX ORDER — VALACYCLOVIR HYDROCHLORIDE 500 MG/1
2000 TABLET, FILM COATED ORAL 2 TIMES DAILY
Status: COMPLETED | OUTPATIENT
Start: 2020-09-15 | End: 2020-09-15

## 2020-09-15 RX ADMIN — CEFEPIME HYDROCHLORIDE 2 G: 2 INJECTION, SOLUTION INTRAVENOUS at 08:09

## 2020-09-15 RX ADMIN — PHENAZOPYRIDINE HYDROCHLORIDE 100 MG: 100 TABLET ORAL at 08:09

## 2020-09-15 RX ADMIN — VALACYCLOVIR HYDROCHLORIDE 2000 MG: 500 TABLET, FILM COATED ORAL at 02:09

## 2020-09-15 RX ADMIN — ACETAMINOPHEN 650 MG: 325 TABLET ORAL at 07:09

## 2020-09-15 RX ADMIN — PHENAZOPYRIDINE HYDROCHLORIDE 100 MG: 100 TABLET ORAL at 05:09

## 2020-09-15 RX ADMIN — CEFTRIAXONE 2 G: 2 INJECTION, SOLUTION INTRAVENOUS at 02:09

## 2020-09-15 RX ADMIN — VALACYCLOVIR HYDROCHLORIDE 2000 MG: 500 TABLET, FILM COATED ORAL at 08:09

## 2020-09-15 RX ADMIN — PHENAZOPYRIDINE HYDROCHLORIDE 100 MG: 100 TABLET ORAL at 11:09

## 2020-09-15 RX ADMIN — Medication 6 MG: at 07:09

## 2020-09-15 RX ADMIN — PHENAZOPYRIDINE HYDROCHLORIDE 100 MG: 100 TABLET ORAL at 02:09

## 2020-09-15 NOTE — ED TRIAGE NOTES
Pt presents to ED and reports she was called to return to the ED for positive blood cultures. Pt C/O continued L flank pain that started a few days ago. Pt states pain is 8/10 at this time.  No C/V stated at this time.

## 2020-09-15 NOTE — ASSESSMENT & PLAN NOTE
- presents with pyelonephritis  - improving with IV antibiotics  - currently no indication for further imaging (CT at initial admit negative)

## 2020-09-15 NOTE — PLAN OF CARE
Problem: Fall Injury Risk  Goal: Absence of Fall and Fall-Related Injury  Outcome: Ongoing, Progressing     Problem: Adult Inpatient Plan of Care  Goal: Plan of Care Review  Outcome: Ongoing, Progressing  Goal: Patient-Specific Goal (Individualization)  Outcome: Ongoing, Progressing  Goal: Absence of Hospital-Acquired Illness or Injury  Outcome: Ongoing, Progressing  Goal: Optimal Comfort and Wellbeing  Outcome: Ongoing, Progressing  Goal: Readiness for Transition of Care  Outcome: Ongoing, Progressing  Goal: Rounds/Family Conference  Outcome: Ongoing, Progressing     Problem: UTI (Urinary Tract Infection)  Goal: Improved Infection Symptoms  Outcome: Ongoing, Progressing     Problem: Pain Acute  Goal: Optimal Pain Control  Outcome: Ongoing, Progressing

## 2020-09-15 NOTE — H&P
[facility] Hospital Medicine  History & Physical    Patient Name: Jessi Samson  MRN: [id number]  Admission Date: 9/14/2020  Attending Physician: Chula Giles*   Primary Care Provider: Alexx Apodaca MD         Patient information was obtained from patient, past medical records and ER records.     Subjective:     Principal Problem:Sepsis due to gram-negative UTI    Chief Complaint:   Chief Complaint   Patient presents with    Abnormal Lab     pt was seen here yesterday and received a call today to come back to be admitted because she had positive blood cultures. c/o continued left flank pain.         HPI: 30 y.o. with no significant past medical history presents to the emergency department today with complaint of an abnormal lab which onset today. Patient was seen here in the ED yesterday, diagnosed with UTI, pending blood cultures.  She received a call today due to bacteria growing in her lab cultures. Associated symptoms include fever this morning, dysuria, flank and abdominal pain. Patient denies chest pain, palpitations, nausea, vomiting, diarrhea. Patient began prescribed antibiotics yesterday. She is being admitted to hospital medicine for further medical management and treatment of bacteremia.     Past Medical History:   Diagnosis Date    Encounter for blood transfusion        History reviewed. No pertinent surgical history.    Review of patient's allergies indicates:  No Known Allergies    No current facility-administered medications on file prior to encounter.      Current Outpatient Medications on File Prior to Encounter   Medication Sig    ibuprofen (ADVIL,MOTRIN) 800 MG tablet Take 1 tablet (800 mg total) by mouth 2 (two) times daily as needed for Pain.    naltrexone-bupropion (CONTRAVE) 8-90 mg TbSR Take 1 tablet by mouth 2 (two) times daily.    sertraline (ZOLOFT) 50 MG tablet Take 1 tablet (50 mg total) by mouth once daily.    ketoconazole (NIZORAL)  2 % shampoo Apply topically twice a week.    levoFLOXacin (LEVAQUIN) 750 MG tablet Take 1 tablet (750 mg total) by mouth once daily. for 7 days    MIRENA 20 mcg/24 hr (5 years) IUD TO BE INSERTED ONE TIME BY PRESCRIBER. ROUTE INTRAUTERINE.    mupirocin (BACTROBAN) 2 % ointment Apply topically 3 (three) times daily.    ondansetron (ZOFRAN) 8 MG tablet Take 1 tablet (8 mg total) by mouth every 8 (eight) hours as needed for Nausea.     Family History     None        Tobacco Use    Smoking status: Never Smoker    Smokeless tobacco: Never Used   Substance and Sexual Activity    Alcohol use: No     Comment: occassionaly    Drug use: No    Sexual activity: Yes     Partners: Male     Birth control/protection: None, I.U.D.     Review of Systems   Constitutional: Positive for fever. Negative for chills.   HENT: Negative for congestion, postnasal drip and rhinorrhea.    Eyes: Negative for visual disturbance.   Respiratory: Negative for chest tightness and shortness of breath.    Cardiovascular: Negative for chest pain and palpitations.   Gastrointestinal: Positive for abdominal pain. Negative for blood in stool, diarrhea, nausea and vomiting.   Genitourinary: Positive for flank pain. Negative for difficulty urinating.   Musculoskeletal: Negative for arthralgias and myalgias.   Skin: Negative for color change and wound.   Neurological: Negative for weakness, light-headedness and numbness.   Hematological: Does not bruise/bleed easily.   Psychiatric/Behavioral: Negative for agitation.     Objective:     Vital Signs (Most Recent):  Temp: 98.2 °F (36.8 °C) (09/14/20 2353)  Pulse: 80 (09/14/20 2353)  Resp: 18 (09/14/20 2353)  BP: 112/64 (09/14/20 2353)  SpO2: 99 % (09/14/20 2353) Vital Signs (24h Range):  Temp:  [98.2 °F (36.8 °C)-99.8 °F (37.7 °C)] 98.2 °F (36.8 °C)  Pulse:  [] 80  Resp:  [18-20] 18  SpO2:  [96 %-99 %] 99 %  BP: (112-124)/(64-80) 112/64     Weight: 74.5 kg (164 lb 3.9 oz)  Body mass index is 30.04  kg/m².    Physical Exam  Constitutional:       Appearance: Normal appearance.   HENT:      Head: Normocephalic and atraumatic.      Nose: Nose normal. No congestion or rhinorrhea.      Mouth/Throat:      Mouth: Mucous membranes are dry.   Eyes:      Extraocular Movements: Extraocular movements intact.      Pupils: Pupils are equal, round, and reactive to light.   Neck:      Musculoskeletal: Normal range of motion and neck supple.   Cardiovascular:      Rate and Rhythm: Regular rhythm. Tachycardia present.   Pulmonary:      Effort: Pulmonary effort is normal.      Breath sounds: Normal breath sounds.   Abdominal:      General: Abdomen is flat. There is no distension.      Tenderness: There is no abdominal tenderness.   Musculoskeletal:         General: No swelling, tenderness or deformity.   Skin:     General: Skin is warm and dry.   Neurological:      General: No focal deficit present.      Mental Status: She is alert and oriented to person, place, and time.   Psychiatric:         Mood and Affect: Mood normal.           CRANIAL NERVES     CN III, IV, VI   Pupils are equal, round, and reactive to light.       Significant Labs:     Bilirubin:   Recent Labs   Lab 09/13/20 1237 09/14/20 2004   BILITOT 1.6* 0.4     Blood Culture:   Recent Labs   Lab 09/13/20  1236   LABBLOO Gram stain aer bottle: Gram negative rods  Gram stain [place] bottle: Gram negative rods  Results called to and read back by:Michelle Ahumada 09/14/2020  10:09  Gram stain aer bottle: Gram negative rods  Results called to and read back by:Michelle Ahumada 09/14/2020  10:09     BMP:   Recent Labs   Lab 09/14/20 2004   *      K 4.0      CO2 28   BUN 6   CREATININE 0.8   CALCIUM 9.5     CBC:   Recent Labs   Lab 09/13/20 1237 09/14/20 2004   WBC 11.36 7.98   HGB 12.6 12.5   HCT 37.6 37.9    313     CMP:   Recent Labs   Lab 09/13/20 1237 09/14/20 2004    139   K 3.6 4.0    103   CO2 23 28   GLU 97 122*   BUN 8 6    CREATININE 0.7 0.8   CALCIUM 9.1 9.5   PROT 7.8 8.0   ALBUMIN 4.3 4.2   BILITOT 1.6* 0.4   ALKPHOS 81 97   AST 19 31   ALT 26 51*   ANIONGAP 10 8   EGFRNONAA >60 >60     Lactic Acid:   Recent Labs   Lab 09/13/20  1237 09/13/20  1600 09/14/20 2004   LACTATE 0.7 0.5 1.0     Urine Studies:   Recent Labs   Lab 09/13/20  1237   COLORU Yellow   APPEARANCEUA Hazy*   PHUR 8.0   SPECGRAV 1.020   PROTEINUA Negative   GLUCUA Negative   KETONESU Negative   BILIRUBINUA Negative   OCCULTUA 3+*   NITRITE Positive*   UROBILINOGEN 1.0   LEUKOCYTESUR 1+*   RBCUA 1   WBCUA 2   BACTERIA Many*   SQUAMEPITHEL 2       Significant Imaging: I have reviewed all pertinent imaging results/findings within the past 24 hours.    Assessment/Plan:     * Sepsis due to gram-negative UTI  Bacteremia  Fever  Tachycardia  Abdominal pain  Flank Pain    Continuous cardiac monitoring  Continue Cefepime  Repeat blood cultures pending  IVFs  Prn antipyretics   Initiate Pyridium        VTE Risk Mitigation (From admission, onward)         Ordered     Place sequential compression device  Until discontinued      09/14/20 2049                   Karley Kwok NP  Department of Hospital Medicine   [facility]

## 2020-09-15 NOTE — ASSESSMENT & PLAN NOTE
Bacteremia  Fever  Tachycardia  Abdominal pain  Flank Pain    Continuous cardiac monitoring  Continue Cefepime  Repeat blood cultures pending  IVFs  Prn antipyretics   Initiate Pyridium

## 2020-09-15 NOTE — SUBJECTIVE & OBJECTIVE
Interval History: Offered , she declined. Doing well, reports that she feels much better today. Back pain improving. Notes onset of lesions around lips. Denies having cold sores in the past, though lesions are consistent.    Review of Systems   Constitutional: Negative for fever.   HENT: Positive for mouth sores.    Respiratory: Negative for shortness of breath.    Cardiovascular: Negative for chest pain.   Gastrointestinal: Negative for nausea and vomiting.   Musculoskeletal: Positive for back pain.     Objective:     Vital Signs (Most Recent):  Temp: 97.2 °F (36.2 °C) (09/15/20 1146)  Pulse: 78 (09/15/20 1146)  Resp: 18 (09/15/20 1146)  BP: 110/70 (09/15/20 1146)  SpO2: 98 % (09/15/20 1215) Vital Signs (24h Range):  Temp:  [97.2 °F (36.2 °C)-99.8 °F (37.7 °C)] 97.2 °F (36.2 °C)  Pulse:  [] 78  Resp:  [16-20] 18  SpO2:  [96 %-99 %] 98 %  BP: (110-124)/(64-80) 110/70     Weight: 74.5 kg (164 lb 3.9 oz)  Body mass index is 30.04 kg/m².    Intake/Output Summary (Last 24 hours) at 9/15/2020 1302  Last data filed at 9/15/2020 0900  Gross per 24 hour   Intake 340 ml   Output --   Net 340 ml      Physical Exam  Constitutional:       Appearance: Normal appearance.   HENT:      Head: Normocephalic and atraumatic.      Nose: Nose normal. No congestion or rhinorrhea.      Mouth/Throat:      Mouth: Mucous membranes are dry.      Comments: Herpes lesions on lips  Eyes:      Extraocular Movements: Extraocular movements intact.      Pupils: Pupils are equal, round, and reactive to light.   Neck:      Musculoskeletal: Normal range of motion and neck supple.   Cardiovascular:      Rate and Rhythm: Normal rate and regular rhythm.   Pulmonary:      Effort: Pulmonary effort is normal.      Breath sounds: Normal breath sounds.   Abdominal:      General: Abdomen is flat. There is no distension.      Tenderness: There is no abdominal tenderness. There is left CVA tenderness.   Musculoskeletal:         General: No  swelling, tenderness or deformity.   Skin:     General: Skin is warm and dry.   Neurological:      General: No focal deficit present.      Mental Status: She is alert and oriented to person, place, and time.   Psychiatric:         Mood and Affect: Mood normal.         Significant Labs: All pertinent labs within the past 24 hours have been reviewed.    Significant Imaging: I have reviewed all pertinent imaging results/findings within the past 24 hours.

## 2020-09-15 NOTE — PROGRESS NOTES
[facility] Medicine  Progress Note    Patient Name: Jessi Samson  MRN: [id number]  Patient Class: IP- Inpatient   Admission Date: 9/14/2020  Length of Stay: 1 days  Attending Physician: Rahul Roberts, *  Primary Care Provider: Alexx Apodaca MD        Subjective:     Principal Problem:E coli bacteremia        HPI:  30 y.o. with no significant past medical history presents to the emergency department today with complaint of an abnormal lab which onset today. Patient was seen here in the ED yesterday, diagnosed with UTI, pending blood cultures.  She received a call today due to bacteria growing in her lab cultures. Associated symptoms include fever this morning, dysuria, flank and abdominal pain. Patient denies chest pain, palpitations, nausea, vomiting, diarrhea. Patient began prescribed antibiotics yesterday. She is being admitted to hospital medicine for further medical management and treatment of bacteremia.     Overview/Hospital Course:  No notes on file    Interval History: Offered , she declined. Doing well, reports that she feels much better today. Back pain improving. Notes onset of lesions around lips. Denies having cold sores in the past, though lesions are consistent.    Review of Systems   Constitutional: Negative for fever.   HENT: Positive for mouth sores.    Respiratory: Negative for shortness of breath.    Cardiovascular: Negative for chest pain.   Gastrointestinal: Negative for nausea and vomiting.   Musculoskeletal: Positive for back pain.     Objective:     Vital Signs (Most Recent):  Temp: 97.2 °F (36.2 °C) (09/15/20 1146)  Pulse: 78 (09/15/20 1146)  Resp: 18 (09/15/20 1146)  BP: 110/70 (09/15/20 1146)  SpO2: 98 % (09/15/20 1215) Vital Signs (24h Range):  Temp:  [97.2 °F (36.2 °C)-99.8 °F (37.7 °C)] 97.2 °F (36.2 °C)  Pulse:  [] 78  Resp:  [16-20] 18  SpO2:  [96 %-99 %] 98 %  BP: (110-124)/(64-80) 110/70     Weight: 74.5  kg (164 lb 3.9 oz)  Body mass index is 30.04 kg/m².    Intake/Output Summary (Last 24 hours) at 9/15/2020 1302  Last data filed at 9/15/2020 0900  Gross per 24 hour   Intake 340 ml   Output --   Net 340 ml      Physical Exam  Constitutional:       Appearance: Normal appearance.   HENT:      Head: Normocephalic and atraumatic.      Nose: Nose normal. No congestion or rhinorrhea.      Mouth/Throat:      Mouth: Mucous membranes are dry.      Comments: Herpes lesions on lips  Eyes:      Extraocular Movements: Extraocular movements intact.      Pupils: Pupils are equal, round, and reactive to light.   Neck:      Musculoskeletal: Normal range of motion and neck supple.   Cardiovascular:      Rate and Rhythm: Normal rate and regular rhythm.   Pulmonary:      Effort: Pulmonary effort is normal.      Breath sounds: Normal breath sounds.   Abdominal:      General: Abdomen is flat. There is no distension.      Tenderness: There is no abdominal tenderness. There is left CVA tenderness.   Musculoskeletal:         General: No swelling, tenderness or deformity.   Skin:     General: Skin is warm and dry.   Neurological:      General: No focal deficit present.      Mental Status: She is alert and oriented to person, place, and time.   Psychiatric:         Mood and Affect: Mood normal.         Significant Labs: All pertinent labs within the past 24 hours have been reviewed.    Significant Imaging: I have reviewed all pertinent imaging results/findings within the past 24 hours.      Assessment/Plan:      * E coli bacteremia  - returned to ED due to gram negative bacteremia, found to have E. Coli  - due to UTI/pyelonephritis  - repeat cultures pending  - initially on cefepime -> ceftriaxone  - stop further IV fluids given improvement in PO intake and resolution of sepsis physiology  - ok to discontinue telemetry given clinical improvement  - monitor cultures for final abx regimen    Herpes infection  - stress induced in setting of  sepsis  - valacyclovir      Complicated UTI (urinary tract infection)  - presents with pyelonephritis  - improving with IV antibiotics  - currently no indication for further imaging (CT at initial admit negative)      VTE Risk Mitigation (From admission, onward)         Ordered     Place sequential compression device  Until discontinued      09/14/20 2049                Discharge Planning   COOKIE:      Code Status: Prior   Is the patient medically ready for discharge?:     Reason for patient still in hospital (select all that apply): Laboratory test                     Rahul Roberts MD  Department of Hospital Medicine   [facility]

## 2020-09-15 NOTE — ED PROVIDER NOTES
Encounter Date: 9/14/2020    SCRIBE #1 NOTE: I, Araceli Le, am scribing for, and in the presence of,  Dr. Crocker. I have scribed the entire note.     I, Dr. Adalid Crocker MD, personally performed the services described in this documentation. All medical record entries made by the scribe were at my direction and in my presence.  I have reviewed the chart and agree that the record reflects my personal performance and is accurate and complete. Adalid Crocker MD.    History     Chief Complaint   Patient presents with    Abnormal Lab     pt was seen here yesterday and received a call today to come back to be admitted because she had positive blood cultures. c/o continued left flank pain.      CHIEF COMPLAINT: Patient presents with:  Abnormal Lab    HISTORY OF PRESENT ILLNESS: Jessi Samson who is a 30 y.o. presents to the emergency department today with complaint of an abnormal lab which onset today. Patient was seen here in the ED yesterday, and received a call today due to bacteria growing in her lab cultures. Symptoms are constant in severity. Associated sxs include fever this morning, dysuria, and abdominal pain. Patient denies any nausea, vomiting, diarrhea, and all other sxs at this time. Patient began prescribed antibiotics yesterday.       ALLERGIES REVIEWED  MEDICATIONS REVIEWED  PMH/PSH/SOC/FH REVIEWED     The history is provided by the patient.    Nursing/Ancillary staff note reviewed.    The history is provided by the patient.     Review of patient's allergies indicates:  No Known Allergies  Past Medical History:   Diagnosis Date    Encounter for blood transfusion      History reviewed. No pertinent surgical history.  No family history on file.  Social History     Tobacco Use    Smoking status: Never Smoker    Smokeless tobacco: Never Used   Substance Use Topics    Alcohol use: No     Comment: occassionaly    Drug use: No     Review of Systems   Constitutional: Positive for fever. Negative  for activity change, appetite change, chills and diaphoresis.   HENT: Negative for congestion, drooling, ear pain, mouth sores, postnasal drip, rhinorrhea, sinus pain, sneezing, sore throat and trouble swallowing.    Eyes: Negative.  Negative for pain and discharge.   Respiratory: Negative for cough, chest tightness, shortness of breath, wheezing and stridor.    Cardiovascular: Negative for chest pain, palpitations and leg swelling.   Gastrointestinal: Positive for abdominal pain. Negative for abdominal distention, blood in stool, constipation, diarrhea, nausea and vomiting.   Genitourinary: Positive for dysuria. Negative for decreased urine volume, difficulty urinating, flank pain, frequency, hematuria and urgency.   Musculoskeletal: Negative for arthralgias, back pain and myalgias.   Skin: Negative for pallor, rash and wound.   Neurological: Negative.  Negative for dizziness, tremors, syncope, weakness, light-headedness, numbness and headaches.   All other systems reviewed and are negative.      Physical Exam     Initial Vitals [09/14/20 1827]   BP Pulse Resp Temp SpO2   123/80 (!) 111 20 99.8 °F (37.7 °C) 96 %      MAP       --         Physical Exam    Nursing note and vitals reviewed.  Constitutional: She appears well-developed and well-nourished.   HENT:   Head: Normocephalic and atraumatic.   Right Ear: External ear normal.   Left Ear: External ear normal.   Nose: Nose normal.   Mouth/Throat: Oropharynx is clear and moist. Mucous membranes are dry.   Eyes: Conjunctivae and EOM are normal. Pupils are equal, round, and reactive to light. No scleral icterus.   Neck: Normal range of motion. Neck supple. No JVD present.   Cardiovascular: Regular rhythm, normal heart sounds and intact distal pulses. Exam reveals no gallop and no friction rub.    No murmur heard.  tachycardic   Pulmonary/Chest: Breath sounds normal. No stridor. No respiratory distress. She has no wheezes. She exhibits no tenderness.   Abdominal:  Soft. Bowel sounds are normal. She exhibits no distension and no mass. There is no abdominal tenderness. There is no rebound and no guarding.   Diffuse lower abdominal pain   Musculoskeletal: Normal range of motion. No tenderness or edema.      Comments: Back is nontender to palpation.    Neurological: She is alert and oriented to person, place, and time. She has normal strength. No cranial nerve deficit.   Skin: Skin is warm and dry. Capillary refill takes less than 2 seconds. No rash noted. No pallor.   Psychiatric: She has a normal mood and affect. Thought content normal.         ED Course   Procedures  Labs Reviewed   COMPREHENSIVE METABOLIC PANEL - Abnormal; Notable for the following components:       Result Value    Glucose 122 (*)     ALT 51 (*)     All other components within normal limits   CBC W/ AUTO DIFFERENTIAL   LACTIC ACID, PLASMA   SARS-COV-2 RNA AMPLIFICATION, QUAL           CT Scan from previous visit yesterday :    CT Renal Stone Study ABD Pelvis WO  Order: [id number]  Status:  Final result   Visible to patient:  Yes (Patient Portal) Next appt:  01/13/2021 at 11:30 AM in Family Medicine (Alexx Apodaca MD)  Details    Reading Physician Reading Date Result Priority   Gerard Ewing MD  [phone] 9/13/2020 STAT      Narrative & Impression     EXAMINATION:  CT RENAL STONE STUDY ABD PELVIS WO     CLINICAL HISTORY:  Abdominal pain, fever;     TECHNIQUE:  Low dose axial images, sagittal and coronal reformations were obtained from the lung bases to the pubic symphysis.  Contrast was not administered.     COMPARISON:  None     FINDINGS:  Heart: Normal in size. No pericardial effusion.     Lung Bases: Well aerated, without consolidation or pleural fluid.     Liver: Normal in size and attenuation, with no focal hepatic lesions.     Gallbladder: No calcified gallstones.     Bile Ducts: No evidence of dilated ducts.     Pancreas: No mass or peripancreatic fat stranding.     Spleen:  Unremarkable.     Adrenals: Unremarkable.     Kidneys/ Ureters: Unremarkable.     Bladder: No evidence of wall thickening.     Reproductive organs: Intrauterine device is in place.     GI Tract/Mesentery: No evidence of bowel obstruction or inflammation.  A normal appearing appendix is identified in the right lower quadrant.     Peritoneal Space: No ascites. No free air.     Retroperitoneum: No significant adenopathy.     Abdominal wall: Unremarkable.     Vasculature: No significant atherosclerosis or aneurysm.     Bones: No acute fracture.     Impression:     No acute findings in the abdomen or pelvis.  No findings identified to account for the patient's reported symptoms.        Electronically signed by: Gerard Ewing  Date:                                            09/13/2020  Time:                                           14:38             Last Resulted: 09/13/20 14:38 Order Details View Encounter Lab and Collection Details Routing Result History                Medical Decision Making:   History:   Old Medical Records: I decided to obtain old medical records.  Old Records Summarized: other records.       <> Summary of Records: Pt seen yesterday for: fever. Onset of fever began 2 days ago with weakness, nausea and one episode of vomiting. Associated symptoms include headache, chills, dysuria and flank pain. Symptoms have progressively worsened. She reports being tested for Covid last week and received a negative result.  The patient has a temp of 102.3° and left flank pain.  White count is within normal limits.  Urinalysis has positive nitrates positive leukocyte esterase 2 white blood cells and many bacteria.  I will obtain a CT scan of her abdomen pelvis at this time to rule out pyelonephritis as the source of her fever.  COVID is negative.  She has no sore throat and no cough.  No shortness of breath. CT scan without significant abnormality. Discharged home on ABX.     Blood Cultures came back with GNR,  called back for IV ABX.      Initial Assessment:   Jessi Samson presents to the emergency department today for gram neg bacteremia. She will be admitted for IV ABX. The pt understands the need for admission.   Differential Diagnosis:   Strep throat, pharyngitis, Otitis, URI, bronchitis, pneumonia, gastritis, gastroenteritis, influenza, UTI    Independently Interpreted Test(s):   I have ordered and independently interpreted EKG Reading(s) - see prior notes  Clinical Tests:   Lab Tests: Ordered and Reviewed  Medical Tests: Ordered and Reviewed  ED Management:  This is a 31 yo female who presents to the ED today with GNR bacteremia. She will be admitte for further evaluation and management with IV ABX. The pt agrees.                              Clinical Impression:       ICD-10-CM ICD-9-CM   1. Bacteremia due to Gram-negative bacteria  R78.81 790.7     041.85   2. Tachycardia  R00.0 785.0             ED Disposition Condition    Observation                             Adalid Crocker MD  09/15/20 0414

## 2020-09-15 NOTE — HPI
30 y.o. with no significant past medical history presents to the emergency department today with complaint of an abnormal lab which onset today. Patient was seen here in the ED yesterday, diagnosed with UTI, pending blood cultures.  She received a call today due to bacteria growing in her lab cultures. Associated symptoms include fever this morning, dysuria, flank and abdominal pain. Patient denies chest pain, palpitations, nausea, vomiting, diarrhea. Patient began prescribed antibiotics yesterday. She is being admitted to hospital medicine for further medical management and treatment of bacteremia.

## 2020-09-15 NOTE — ASSESSMENT & PLAN NOTE
- returned to ED due to gram negative bacteremia, found to have E. Coli  - due to UTI/pyelonephritis  - repeat cultures pending  - initially on cefepime -> ceftriaxone  - stop further IV fluids given improvement in PO intake and resolution of sepsis physiology  - ok to discontinue telemetry given clinical improvement  - monitor cultures for final abx regimen

## 2020-09-15 NOTE — PROGRESS NOTES
Pharmacist Renal Dose Adjustment Note    Jessi Samson is a 30 y.o. female being treated with the medication cefepime    Patient Data:    Vital Signs (Most Recent):  Temp: 97.5 °F (36.4 °C) (09/15/20 0735)  Pulse: 92 (09/15/20 0735)  Resp: 16 (09/15/20 0421)  BP: 110/67 (09/15/20 0735)  SpO2: 99 % (09/15/20 0809) Vital Signs (72h Range):  Temp:  [97.5 °F (36.4 °C)-102.3 °F (39.1 °C)]   Pulse:  []   Resp:  [16-20]   BP: ()/(63-80)   SpO2:  [96 %-99 %]      Recent Labs   Lab 09/13/20  1237 09/14/20  2004 09/15/20  0133   CREATININE 0.7 0.8 0.6     Serum creatinine: 0.6 mg/dL 09/15/20 0133  Estimated creatinine clearance: 128.5 mL/min    Medication:cefepime dose: 2gram frequency q12h will be changed to medication:cefepime dose:2grams frequency:q8h    Pharmacist's Name: Moncho Parada  Pharmacist's Extension: [id number]

## 2020-09-15 NOTE — PLAN OF CARE
VN cued into room to complete admit assessment, VIP model introduced, VN working alongside bedside treatment team.  Plan of care reviewed with patient. Patient verbalized understanding. Patient informed of fall risk and fall precautions, call light within reach, 2x bed rails. Patient notified to ask staff for assistance and pt verbalized complete understanding. Time allowed for questions. Will continue to monitor and intervene as needed. Chart review complete.

## 2020-09-15 NOTE — SUBJECTIVE & OBJECTIVE
Past Medical History:   Diagnosis Date    Encounter for blood transfusion        History reviewed. No pertinent surgical history.    Review of patient's allergies indicates:  No Known Allergies    No current facility-administered medications on file prior to encounter.      Current Outpatient Medications on File Prior to Encounter   Medication Sig    ibuprofen (ADVIL,MOTRIN) 800 MG tablet Take 1 tablet (800 mg total) by mouth 2 (two) times daily as needed for Pain.    naltrexone-bupropion (CONTRAVE) 8-90 mg TbSR Take 1 tablet by mouth 2 (two) times daily.    sertraline (ZOLOFT) 50 MG tablet Take 1 tablet (50 mg total) by mouth once daily.    ketoconazole (NIZORAL) 2 % shampoo Apply topically twice a week.    levoFLOXacin (LEVAQUIN) 750 MG tablet Take 1 tablet (750 mg total) by mouth once daily. for 7 days    MIRENA 20 mcg/24 hr (5 years) IUD TO BE INSERTED ONE TIME BY PRESCRIBER. ROUTE INTRAUTERINE.    mupirocin (BACTROBAN) 2 % ointment Apply topically 3 (three) times daily.    ondansetron (ZOFRAN) 8 MG tablet Take 1 tablet (8 mg total) by mouth every 8 (eight) hours as needed for Nausea.     Family History     None        Tobacco Use    Smoking status: Never Smoker    Smokeless tobacco: Never Used   Substance and Sexual Activity    Alcohol use: No     Comment: occassionaly    Drug use: No    Sexual activity: Yes     Partners: Male     Birth control/protection: None, I.U.D.     Review of Systems   Constitutional: Positive for fever. Negative for chills.   HENT: Negative for congestion, postnasal drip and rhinorrhea.    Eyes: Negative for visual disturbance.   Respiratory: Negative for chest tightness and shortness of breath.    Cardiovascular: Negative for chest pain and palpitations.   Gastrointestinal: Positive for abdominal pain. Negative for blood in stool, diarrhea, nausea and vomiting.   Genitourinary: Positive for flank pain. Negative for difficulty urinating.   Musculoskeletal: Negative for  arthralgias and myalgias.   Skin: Negative for color change and wound.   Neurological: Negative for weakness, light-headedness and numbness.   Hematological: Does not bruise/bleed easily.   Psychiatric/Behavioral: Negative for agitation.     Objective:     Vital Signs (Most Recent):  Temp: 98.2 °F (36.8 °C) (09/14/20 2353)  Pulse: 80 (09/14/20 2353)  Resp: 18 (09/14/20 2353)  BP: 112/64 (09/14/20 2353)  SpO2: 99 % (09/14/20 2353) Vital Signs (24h Range):  Temp:  [98.2 °F (36.8 °C)-99.8 °F (37.7 °C)] 98.2 °F (36.8 °C)  Pulse:  [] 80  Resp:  [18-20] 18  SpO2:  [96 %-99 %] 99 %  BP: (112-124)/(64-80) 112/64     Weight: 74.5 kg (164 lb 3.9 oz)  Body mass index is 30.04 kg/m².    Physical Exam  Constitutional:       Appearance: Normal appearance.   HENT:      Head: Normocephalic and atraumatic.      Nose: Nose normal. No congestion or rhinorrhea.      Mouth/Throat:      Mouth: Mucous membranes are dry.   Eyes:      Extraocular Movements: Extraocular movements intact.      Pupils: Pupils are equal, round, and reactive to light.   Neck:      Musculoskeletal: Normal range of motion and neck supple.   Cardiovascular:      Rate and Rhythm: Regular rhythm. Tachycardia present.   Pulmonary:      Effort: Pulmonary effort is normal.      Breath sounds: Normal breath sounds.   Abdominal:      General: Abdomen is flat. There is no distension.      Tenderness: There is no abdominal tenderness.   Musculoskeletal:         General: No swelling, tenderness or deformity.   Skin:     General: Skin is warm and dry.   Neurological:      General: No focal deficit present.      Mental Status: She is alert and oriented to person, place, and time.   Psychiatric:         Mood and Affect: Mood normal.           CRANIAL NERVES     CN III, IV, VI   Pupils are equal, round, and reactive to light.       Significant Labs:     Bilirubin:   Recent Labs   Lab 09/13/20  1237 09/14/20 2004   BILITOT 1.6* 0.4     Blood Culture:   Recent Labs   Lab  09/13/20  1236   LABBLOO Gram stain aer bottle: Gram negative rods  Gram stain [place] bottle: Gram negative rods  Results called to and read back by:Michelle Ahumada 09/14/2020  10:09  Gram stain aer bottle: Gram negative rods  Results called to and read back by:Michelle Ahumada 09/14/2020  10:09     BMP:   Recent Labs   Lab 09/14/20 2004   *      K 4.0      CO2 28   BUN 6   CREATININE 0.8   CALCIUM 9.5     CBC:   Recent Labs   Lab 09/13/20  1237 09/14/20 2004   WBC 11.36 7.98   HGB 12.6 12.5   HCT 37.6 37.9    313     CMP:   Recent Labs   Lab 09/13/20  1237 09/14/20 2004    139   K 3.6 4.0    103   CO2 23 28   GLU 97 122*   BUN 8 6   CREATININE 0.7 0.8   CALCIUM 9.1 9.5   PROT 7.8 8.0   ALBUMIN 4.3 4.2   BILITOT 1.6* 0.4   ALKPHOS 81 97   AST 19 31   ALT 26 51*   ANIONGAP 10 8   EGFRNONAA >60 >60     Lactic Acid:   Recent Labs   Lab 09/13/20  1237 09/13/20  1600 09/14/20 2004   LACTATE 0.7 0.5 1.0     Urine Studies:   Recent Labs   Lab 09/13/20  1237   COLORU Yellow   APPEARANCEUA Hazy*   PHUR 8.0   SPECGRAV 1.020   PROTEINUA Negative   GLUCUA Negative   KETONESU Negative   BILIRUBINUA Negative   OCCULTUA 3+*   NITRITE Positive*   UROBILINOGEN 1.0   LEUKOCYTESUR 1+*   RBCUA 1   WBCUA 2   BACTERIA Many*   SQUAMEPITHEL 2       Significant Imaging: I have reviewed all pertinent imaging results/findings within the past 24 hours.

## 2020-09-15 NOTE — PLAN OF CARE
Pt oriented. DCA done via telephone with  on [facility]. Line #[id number] Eboni. Demographics/Ins/NextofKin/PCP verified with patient/family and updated as needed. Denies any DME needs at present from pt/family. Patient/family plans to return home with family. Educated on bedside RX. Patient consents for TN to discuss discharge plan with next of kin. Preferences appointment times and location obtained. Patient reports they will have transportation home upon discharge.    Pt is independent and lives with  and daughters. Pt works still and will have transport home upon discharge.     Future Appointments   Date Time Provider Department Center   1/13/2021 11:30 AM Alexx Apodaca MD [facility]        09/15/20 6160   Discharge Assessment   Assessment Type Discharge Planning Assessment   Confirmed/corrected address and phone number on facesheet? Yes   Assessment information obtained from? Patient  (Horacio. line #[id number] Tiesha)   Communicated expected length of stay with patient/caregiver yes   Prior to hospitilization cognitive status: Alert/Oriented;No Deficits   Prior to hospitalization functional status: Independent   Current cognitive status: Alert/Oriented;No Deficits   Current Functional Status: Independent   Able to Return to Prior Arrangements yes   Is patient able to care for self after discharge? Yes   Patient's perception of discharge disposition home or selfcare   Readmission Within the Last 30 Days no previous admission in last 30 days   Patient currently being followed by outpatient case management? No   Patient currently receives any other outside agency services? No   Equipment Currently Used at Home none   Does the patient have transportation home? Yes   Transportation Anticipated family or friend will provide   Does the patient receive services at the [facility]? No   Discharge Plan A Home with family   DME Needed Upon Discharge  none   Patient/Family in Agreement with  Plan yes   Does the patient have transportation to healthcare appointments? Yes     Lea Caban RN  RN Transition Navigator  [phone]

## 2020-09-15 NOTE — ED NOTES
Upon carehandoff, this nurse introduced herself to pt. pt vital signs are stable at this time. pt in no apparent distress. pt updated on POC at this time. pt verbalizes understanding. pt given call bell and instructed to call for assistance. pt verbalizes understanding and provides return demo. Will continue to monitor.

## 2020-09-15 NOTE — NURSING
Patient arrived on unit via wheelchair. No distress noted.   Kristy use to translate, patient Malay speaking. Plan of care reviewed, patient verbalized understanding. Cardiac monitor placed. VSS. Reassessment completed. NS infusing @ 100 ml/hr continuously. Fall precaution maintained: bed on lowest position, call light within reach, bed wheels locked, side-rails up x 2, bed alarm set. Will continue to monitor.

## 2020-09-16 VITALS
OXYGEN SATURATION: 98 % | DIASTOLIC BLOOD PRESSURE: 56 MMHG | SYSTOLIC BLOOD PRESSURE: 119 MMHG | WEIGHT: 164.25 LBS | BODY MASS INDEX: 30.22 KG/M2 | RESPIRATION RATE: 17 BRPM | TEMPERATURE: 98 F | HEIGHT: 62 IN | HEART RATE: 83 BPM

## 2020-09-16 PROCEDURE — 25000003 PHARM REV CODE 250: Performed by: NURSE PRACTITIONER

## 2020-09-16 PROCEDURE — 94761 N-INVAS EAR/PLS OXIMETRY MLT: CPT

## 2020-09-16 RX ORDER — CIPROFLOXACIN 500 MG/1
500 TABLET ORAL EVERY 12 HOURS
Qty: 24 TABLET | Refills: 0 | Status: SHIPPED | OUTPATIENT
Start: 2020-09-16 | End: 2020-09-24 | Stop reason: ALTCHOICE

## 2020-09-16 RX ADMIN — PHENAZOPYRIDINE HYDROCHLORIDE 100 MG: 100 TABLET ORAL at 09:09

## 2020-09-16 NOTE — DISCHARGE SUMMARY
"[facility] Hospital Medicine  Discharge Summary      Patient Name: Jessi Samson  MRN: [id number]  Admission Date: 9/14/2020  Hospital Length of Stay: 2 days  Discharge Date and Time:  09/16/2020 11:20 AM  Attending Physician: Rahul Roberts, *   Discharging Provider: Rahul Roberts MD  Primary Care Provider: Alexx Apodaca MD      HPI:   30 y.o. with no significant past medical history presents to the emergency department today with complaint of an abnormal lab which onset today. Patient was seen here in the ED yesterday, diagnosed with UTI, pending blood cultures.  She received a call today due to bacteria growing in her lab cultures. Associated symptoms include fever this morning, dysuria, flank and abdominal pain. Patient denies chest pain, palpitations, nausea, vomiting, diarrhea. Patient began prescribed antibiotics yesterday. She is being admitted to hospital medicine for further medical management and treatment of bacteremia.     * No surgery found *      Hospital Course:   Ms. Jese Samson presented with E. Coli bacteremia 2/2 pyelonephritis. Initially treated with IV cefepime-> ceftriaxone following speciation of GNR. Now de-escalated to cipro for total 14d course given bacteremia and complicated UTI with pyelonephritis. Patient has pansensitive cultures. Also with oral herpes flare while in house, likely stress mediated. Given course of valtrex. Problem based discussion below.    BP (!) 119/56   Pulse 83   Temp 98.1 °F (36.7 °C)   Resp 17   Ht 5' 2" (1.575 m)   Wt 74.5 kg (164 lb 3.9 oz)   LMP 09/12/2020   SpO2 98%   Breastfeeding No   BMI 30.04 kg/m²   Physical Exam  Constitutional:       Appearance: Normal appearance.   HENT:      Head: Normocephalic and atraumatic.      Nose: Nose normal. No congestion or rhinorrhea.      Mouth/Throat:      Mouth: Mucous membranes are dry.      Comments: Herpes lesions on lips  Eyes:      Extraocular " Movements: Extraocular movements intact.      Pupils: Pupils are equal, round, and reactive to light.   Neck:      Musculoskeletal: Normal range of motion and neck supple.   Cardiovascular:      Rate and Rhythm: Normal rate and regular rhythm.   Pulmonary:      Effort: Pulmonary effort is normal.      Breath sounds: Normal breath sounds.   Abdominal:      General: Abdomen is flat. There is no distension.      Tenderness: There is no abdominal tenderness. There is left CVA tenderness, improved.   Musculoskeletal:         General: No swelling, tenderness or deformity.   Skin:     General: Skin is warm and dry.   Neurological:      General: No focal deficit present.      Mental Status: She is alert and oriented to person, place, and time.   Psychiatric:         Mood and Affect: Mood normal.      Consults:     * E coli bacteremia  - returned to ED due to gram negative bacteremia, found to have E. Coli  - due to UTI/pyelonephritis  - repeat cultures pansensitive  - initially on cefepime -> ceftriaxone-> cipro 500mg bid x14d total course (12 add'l days)  - counseled on abx side effects    Herpes infection  - stress induced in setting of sepsis  - valacyclovir      Complicated UTI (urinary tract infection)  - presents with pyelonephritis  - improving with antibiotics  - currently no indication for further imaging (CT at initial admit negative)      Final Active Diagnoses:    Diagnosis Date Noted POA    PRINCIPAL PROBLEM:  E coli bacteremia [R78.81] 09/14/2020 Yes    Flank pain [R10.9] 09/15/2020 Yes    Complicated UTI (urinary tract infection) [N39.0] 09/15/2020 Yes    Herpes infection [B00.9] 09/15/2020 Yes    Bacteremia [R78.81] 09/14/2020 Yes    Tachycardia [R00.0] 09/14/2020 Yes    Fever [R50.9] 09/14/2020 Yes      Problems Resolved During this Admission:       Discharged Condition: good    Disposition: Home or Self Care    Follow Up:  Follow-up Information     Schedule an appointment as soon as possible for a  visit with Alexx Apodaca MD.    Specialty: Family Medicine  Why: FOLLOW UP WITH PCP, FOLLOW UP WITH PCP AFTER [facility] APPT  Contact information:  [address] [id number] [phone]             [facility]. Go on 9/24/2020.    Specialty: [facility]  Why:  at 0930am; [facility] AFTER RECENT DISCHARGE  Contact information:  [address], [address]  [place] [address] [phone]  Additional information:  At this time [facility] will only use these entries [facility], [facility], and Emergency Department due to COVID-19 precautions.                Patient Instructions:      Diet Adult Regular     Notify your health care provider if you experience any of the following:  temperature >100.4     Notify your health care provider if you experience any of the following:  persistent nausea and vomiting or diarrhea     Notify your health care provider if you experience any of the following:  severe uncontrolled pain     Activity as tolerated       Significant Diagnostic Studies: Labs:   CMP   Recent Labs   Lab 09/14/20  2004 09/15/20  0133    137   K 4.0 3.7    107   CO2 28 25   * 103   BUN 6 7   CREATININE 0.8 0.6   CALCIUM 9.5 8.2*   PROT 8.0 6.5   ALBUMIN 4.2 3.4*   BILITOT 0.4 0.3   ALKPHOS 97 80   AST 31 28   ALT 51* 43   ANIONGAP 8 5*   ESTGFRAFRICA >60 >60   EGFRNONAA >60 >60   , CBC   Recent Labs   Lab 09/14/20  2004 09/15/20  0133   WBC 7.98 8.20   HGB 12.5 11.3*   HCT 37.9 34.1*    272   , INR No results found for: INR, PROTIME, Lipid Panel   Lab Results   Component Value Date    CHOL 181 08/14/2019    HDL 38 (L) 08/14/2019    LDLCALC 99.2 08/14/2019    TRIG 219 (H) 08/14/2019    CHOLHDL 21.0 08/14/2019   , Troponin No results for input(s): TROPONINI in the last 168 hours., A1C: No results for input(s): HGBA1C in the last 4320 hours. and All labs within the past 24 hours have been  reviewed  Microbiology:   Blood Culture   Lab Results   Component Value Date    LABBLOO No Growth to date 09/15/2020    LABBLOO No Growth to date 09/15/2020    and Urine Culture    Lab Results   Component Value Date    LABURIN  05/11/2017     Multiple organisms isolated. None in predominance.  Repeat if    LABURIN clinically necessary. 05/11/2017       Pending Diagnostic Studies:     None         Medications:  Reconciled Home Medications:      Medication List      START taking these medications    ciprofloxacin HCl 500 MG tablet  Commonly known as: CIPRO  Take 1 tablet (500 mg total) by mouth every 12 (twelve) hours. for 12 days        CONTINUE taking these medications    CONTRAVE 8-90 mg Tbsr  Generic drug: naltrexone-bupropion  Take 1 tablet by mouth 2 (two) times daily.     ibuprofen 800 MG tablet  Commonly known as: ADVIL,MOTRIN  Take 1 tablet (800 mg total) by mouth 2 (two) times daily as needed for Pain.     ketoconazole 2 % shampoo  Commonly known as: NIZORAL  Apply topically twice a week.     MIRENA 20 mcg/24 hours (5 yrs) 52 mg IUD  Generic drug: levonorgestreL  TO BE INSERTED ONE TIME BY PRESCRIBER. ROUTE INTRAUTERINE.     mupirocin 2 % ointment  Commonly known as: BACTROBAN  Apply topically 3 (three) times daily.     ondansetron 8 MG tablet  Commonly known as: ZOFRAN  Take 1 tablet (8 mg total) by mouth every 8 (eight) hours as needed for Nausea.     sertraline 50 MG tablet  Commonly known as: ZOLOFT  Take 1 tablet (50 mg total) by mouth once daily.        STOP taking these medications    levoFLOXacin 750 MG tablet  Commonly known as: LEVAQUIN            Indwelling Lines/Drains at time of discharge:   Lines/Drains/Airways     None                 Time spent on the discharge of patient: 32 minutes  Patient was seen and examined on the date of discharge and determined to be suitable for discharge.         Rahul Roberts MD  Department of Hospital Medicine  [facility]

## 2020-09-16 NOTE — NURSING
IV site and telemetry discontinued without adverse reaction. Discharge instructions and education given to patient and family member.

## 2020-09-16 NOTE — PLAN OF CARE
Discharge rounds on patient. Discussed followup appointments, blue discharge folder, discharge nurse will go over home medications and reasons for medications and final discharge instructions. All patient/caregiver questions answered. Patient verbalized understanding.    TN educated pt and  at bedside. Pt did not require an  for English. TN encouraged adequate hydration and nourishment while taking a long course of PO ABX Cipro. Start taking yogurt for any gastrointestinal side effects and reach out to provider with any questions. Spouse bedside to transport home. BS nurse notified.    Future Appointments   Date Time Provider Department Center   9/24/2020  9:30 AM Cindy Junior MD [facility]   1/13/2021 11:30 AM Alexx Apodaca MD [facility]     Msg was sent to get PCP appt. Pt referred to [facility] appt. Appt card given.       09/16/20 1030   Final Note   Assessment Type Final Discharge Note   Anticipated Discharge Disposition Home   What phone number can be called within the next 1-3 days to see how you are doing after discharge? [phone]   Hospital Follow Up  Appt(s) scheduled? Yes   Discharge plans and expectations educations in teach back method with documentation complete? Yes   Right Care Referral Info   Post Acute Recommendation No Care   Post-Acute Status   Discharge Delays None known at this time     Lea Caban RN  RN Transition Navigator  [phone]

## 2020-09-16 NOTE — ASSESSMENT & PLAN NOTE
- returned to ED due to gram negative bacteremia, found to have E. Coli  - due to UTI/pyelonephritis  - repeat cultures pansensitive  - initially on cefepime -> ceftriaxone-> cipro 500mg bid x14d total course (12 add'l days)  - counseled on abx side effects

## 2020-09-16 NOTE — PLAN OF CARE
Reviewed plan of care with patient. Pt verbalized understanding. Pt c/o pain on left side earlier in the night, pt rated pain a 7 out of 10, administered pain medication, f/u with pt and she stated that the pain had decreased to a 3 out of 10. Will continue to monitor.

## 2020-09-16 NOTE — HOSPITAL COURSE
"Ms. Jese Samson presented with E. Coli bacteremia 2/2 pyelonephritis. Initially treated with IV cefepime-> ceftriaxone following speciation of GNR. Now de-escalated to cipro for total 14d course given bacteremia and complicated UTI with pyelonephritis. Patient has pansensitive cultures. Also with oral herpes flare while in house, likely stress mediated. Given course of valtrex. Problem based discussion below.    BP (!) 119/56   Pulse 83   Temp 98.1 °F (36.7 °C)   Resp 17   Ht 5' 2" (1.575 m)   Wt 74.5 kg (164 lb 3.9 oz)   LMP 09/12/2020   SpO2 98%   Breastfeeding No   BMI 30.04 kg/m²   Physical Exam  Constitutional:       Appearance: Normal appearance.   HENT:      Head: Normocephalic and atraumatic.      Nose: Nose normal. No congestion or rhinorrhea.      Mouth/Throat:      Mouth: Mucous membranes are dry.      Comments: Herpes lesions on lips  Eyes:      Extraocular Movements: Extraocular movements intact.      Pupils: Pupils are equal, round, and reactive to light.   Neck:      Musculoskeletal: Normal range of motion and neck supple.   Cardiovascular:      Rate and Rhythm: Normal rate and regular rhythm.   Pulmonary:      Effort: Pulmonary effort is normal.      Breath sounds: Normal breath sounds.   Abdominal:      General: Abdomen is flat. There is no distension.      Tenderness: There is no abdominal tenderness. There is left CVA tenderness, improved.   Musculoskeletal:         General: No swelling, tenderness or deformity.   Skin:     General: Skin is warm and dry.   Neurological:      General: No focal deficit present.      Mental Status: She is alert and oriented to person, place, and time.   Psychiatric:         Mood and Affect: Mood normal.   "

## 2020-09-16 NOTE — ASSESSMENT & PLAN NOTE
- presents with pyelonephritis  - improving with antibiotics  - currently no indication for further imaging (CT at initial admit negative)

## 2020-09-16 NOTE — NURSING
Patient is resting, denies pain, no distress noted, NSR on Tele, no true red alarms. Bed in lowest position, side rails up x 2, bedside table and call light within reach.

## 2020-09-17 ENCOUNTER — TELEPHONE (OUTPATIENT)
Dept: FAMILY MEDICINE | Facility: CLINIC | Age: 30
End: 2020-09-17

## 2020-09-17 ENCOUNTER — PATIENT OUTREACH (OUTPATIENT)
Dept: ADMINISTRATIVE | Facility: CLINIC | Age: 30
End: 2020-09-17

## 2020-09-17 NOTE — TELEPHONE ENCOUNTER
----- Message from Verónica Cohen LPN sent at 9/17/2020  2:40 PM CDT -----  Regarding: FW: hospital F/U appt  Contact: ANNALEE Tate Case Manager  Please advise  ----- Message -----  From: Lea Caban RN  Sent: 9/15/2020   3:18 PM CDT  To: Verónica Cohen LPN  Subject: hospital F/U appt                                Pt will need a hospital follow up appointment in 1 - 2 wks with PCP.  Please schedule so it would be on the discharge paperwork.

## 2020-09-17 NOTE — PATIENT INSTRUCTIONS

## 2020-09-20 LAB
BACTERIA BLD CULT: NORMAL
BACTERIA BLD CULT: NORMAL

## 2020-09-21 NOTE — PHYSICIAN QUERY
PT Name: Jessi Samson  MR #: [id number]     ACUITY OF CONDITION CLARIFICATION      CDS: Brigette Salas RN, CCDS         Contact information :ext [phone] [email or web address]     This form is a permanent document in the medical record.     Query Date: September 21, 2020    By submitting this query, we are merely seeking further clarification of documentation to reflect the severity of illness of your patient. Please utilize your independent clinical judgment when addressing the question(s) below.    The Medical record reflects the following:     Indicators   Supporting Clinical Findings Location in Medical Record   x Documentation of condition returned to ED due to gram negative bacteremia, found to have E. Coli  - due to UTI/pyelonephritis Hospital medicine PN 9/15/20    Lab Value(s)      Radiology Findings     x Treatment/Medication presented with E. Coli bacteremia 2/2 pyelonephritis. Initially treated with IV cefepime-> ceftriaxone following speciation of GNR. Now de-escalated to cipro for total 14d course given bacteremia and complicated UTI with pyelonephritis Discharge summary 9/16/20   x Other Patient was seen here in the ED yesterday, diagnosed with UTI, pending blood cultures.  She received a call today due to bacteria growing in her lab cultures. Associated symptoms include fever this morning, dysuria, flank and abdominal pain. H&P 9/14/20      Provider, please specify the acuity/chronicity of Pyelonephritis:    [ x  ] Acute   [   ] Chronic   [   ] Acute on chronic   [   ] Other clarification, ______________________________   [   ]  Clinically Undetermined

## 2020-09-21 NOTE — PHYSICIAN QUERY
PT Name: Jessi Samson  MR #: [id number]     Documentation Clarification      CDS: Brigette Salas RN, CCDS         Contact information :ext [phone] [email or web address]       This form is a permanent document in the medical record.     Query Date: September 21, 2020    By submitting this query, we are merely seeking further clarification of documentation. Please utilize your independent clinical judgment when addressing the question(s) below.    The Medical Record reflects the following:    Clinical Findings Location in Medical Record     pt was seen here yesterday and received a call today to come back to be admitted because she had positive blood cultures. c/o continued left flank pain    Sepsis due to gram-negative UTI  Bacteremia  Fever  Tachycardia    Blood culture  Escherichia coli      presented with E. Coli bacteremia 2/2 pyelonephritis. Initially treated with IV cefepime-> ceftriaxone following speciation of GNR. Now de-escalated to cipro for total 14d course given bacteremia and complicated UTI with pyelonephritis. Patient has pansensitive cultures. Also with oral herpes flare while in house, likely stress mediated. Given course of valtrex     ED Provider note 9/14/20          H&P 9/14/20          Lab 9/14/20        Discharge summary 9/16/20                                                                                        Provider, please clarify the diagnosis of  Sepsis :      [x   ] Sepsis is confirmed and additional clinical support/decision-making indicators for the diagnosis include (please specify):__pyelonephritis, tachycardia, fevers______________   [   ] Sepsis is not confirmed and/or it has been ruled out     [   ] Sepsis is not confirmed and/or it has been ruled out, other diagnosis ruled in (please specify):_______________   [   ] Other clarification (please specify): ___________________   [  ] Clinically undetermined

## 2020-09-23 ENCOUNTER — PATIENT OUTREACH (OUTPATIENT)
Dept: ADMINISTRATIVE | Facility: OTHER | Age: 30
End: 2020-09-23

## 2020-09-24 ENCOUNTER — LAB VISIT (OUTPATIENT)
Dept: LAB | Facility: HOSPITAL | Age: 30
End: 2020-09-24
Attending: INTERNAL MEDICINE
Payer: COMMERCIAL

## 2020-09-24 ENCOUNTER — OFFICE VISIT (OUTPATIENT)
Dept: PRIMARY CARE CLINIC | Facility: CLINIC | Age: 30
End: 2020-09-24
Payer: COMMERCIAL

## 2020-09-24 VITALS
DIASTOLIC BLOOD PRESSURE: 75 MMHG | TEMPERATURE: 98 F | WEIGHT: 160.69 LBS | OXYGEN SATURATION: 99 % | SYSTOLIC BLOOD PRESSURE: 116 MMHG | HEART RATE: 86 BPM | BODY MASS INDEX: 29.4 KG/M2

## 2020-09-24 DIAGNOSIS — N39.0 COMPLICATED UTI (URINARY TRACT INFECTION): ICD-10-CM

## 2020-09-24 DIAGNOSIS — N12 PYELONEPHRITIS: ICD-10-CM

## 2020-09-24 DIAGNOSIS — R78.81 E COLI BACTEREMIA: ICD-10-CM

## 2020-09-24 DIAGNOSIS — N12 PYELONEPHRITIS: Primary | ICD-10-CM

## 2020-09-24 DIAGNOSIS — B96.20 E COLI BACTEREMIA: ICD-10-CM

## 2020-09-24 DIAGNOSIS — R51.9 CHRONIC NONINTRACTABLE HEADACHE, UNSPECIFIED HEADACHE TYPE: ICD-10-CM

## 2020-09-24 DIAGNOSIS — G89.29 CHRONIC NONINTRACTABLE HEADACHE, UNSPECIFIED HEADACHE TYPE: ICD-10-CM

## 2020-09-24 PROBLEM — R10.9 ABDOMINAL PAIN: Status: RESOLVED | Noted: 2017-07-04 | Resolved: 2020-09-24

## 2020-09-24 PROBLEM — R50.9 FEVER: Status: RESOLVED | Noted: 2020-09-14 | Resolved: 2020-09-24

## 2020-09-24 LAB
BACTERIA #/AREA URNS HPF: ABNORMAL /HPF
BILIRUB UR QL STRIP: NEGATIVE
CLARITY UR: CLEAR
COLOR UR: YELLOW
GLUCOSE UR QL STRIP: NEGATIVE
HGB UR QL STRIP: ABNORMAL
KETONES UR QL STRIP: NEGATIVE
LEUKOCYTE ESTERASE UR QL STRIP: ABNORMAL
MICROSCOPIC COMMENT: ABNORMAL
NITRITE UR QL STRIP: NEGATIVE
PH UR STRIP: 6 [PH] (ref 5–8)
PROT UR QL STRIP: NEGATIVE
RBC #/AREA URNS HPF: 2 /HPF (ref 0–4)
SP GR UR STRIP: 1.02 (ref 1–1.03)
SQUAMOUS #/AREA URNS HPF: 10 /HPF
URN SPEC COLLECT METH UR: ABNORMAL
UROBILINOGEN UR STRIP-ACNC: NEGATIVE EU/DL
WBC #/AREA URNS HPF: 8 /HPF (ref 0–5)

## 2020-09-24 PROCEDURE — 99495 TRANSJ CARE MGMT MOD F2F 14D: CPT | Mod: S$GLB,,, | Performed by: INTERNAL MEDICINE

## 2020-09-24 PROCEDURE — 99999 PR PBB SHADOW E&M-EST. PATIENT-LVL III: ICD-10-PCS | Mod: PBBFAC,,, | Performed by: INTERNAL MEDICINE

## 2020-09-24 PROCEDURE — 81000 URINALYSIS NONAUTO W/SCOPE: CPT

## 2020-09-24 PROCEDURE — 99999 PR PBB SHADOW E&M-EST. PATIENT-LVL III: CPT | Mod: PBBFAC,,, | Performed by: INTERNAL MEDICINE

## 2020-09-24 PROCEDURE — 99495 TCM SERVICES (MODERATE COMPLEXITY): ICD-10-PCS | Mod: S$GLB,,, | Performed by: INTERNAL MEDICINE

## 2020-09-24 RX ORDER — AMOXICILLIN AND CLAVULANATE POTASSIUM 875; 125 MG/1; MG/1
1 TABLET, FILM COATED ORAL 2 TIMES DAILY
Qty: 14 TABLET | Refills: 0 | Status: SHIPPED | OUTPATIENT
Start: 2020-09-24 | End: 2020-10-01

## 2020-09-24 NOTE — PROGRESS NOTES
[facility]   New Visit Progress Note   Recent Hospital Discharge     PRESENTING HISTORY     Chief Complaint/Reason for Admission:  Follow up Hospital Discharge   PCP: Alexx Apodaca MD    History of Present Illness:  Ms. Jessi Samson is a 30 y.o. female who was recently admitted to the hospital.    [facility] Medicine  Discharge Summary        Patient Name: Jessi Samson  MRN: [id number]  Admission Date: 9/14/2020  Hospital Length of Stay: 2 days  Discharge Date and Time:  09/16/2020 11:20 AM  Attending Physician: Rahul Roberts, *   Discharging Provider: Rahul Roberts MD  Primary Care Provider: Alexx Apodaca MD  ___________________________________________________________________    Today:    Presents to [facility] for initial hospital follow up.  Recently hospitalized for E coli bacteremia and pyelonephritis.  Initially treated with cefepime; changed to ceftriaxone when cultures returned.  CT renal stone study without abnormalities.  Patient responded well to antibiotic therapy and supportive care.  Discharged to home on 14 day course Cipro.    Unaccompanied today.  Ambulatory and independent with ADL's.  Reports compliance with medication, specifically Cipro.  Patient seen with assistance of professional .  She reports ongoing L flank pain, intensity 6/10, worse with movement or deep inspiration.  Has ongoing nausea; no vomiting. She is tolerating oral intake.  Burning with urination.   New onset pruritis, no rash.  No fever/chills.     Review of Systems  General ROS: negative for chills, fever or weight loss  + insomnia   Psychological ROS: negative for hallucination, depression or suicidal ideation  Ophthalmic ROS: negative for blurry vision, photophobia or eye pain  ENT ROS: negative for epistaxis, sore throat or rhinorrhea  Respiratory ROS: no cough, shortness of breath, or wheezing  Cardiovascular ROS: no  chest pain or dyspnea on exertion  Gastrointestinal ROS: no abdominal pain, change in bowel habits, or black/ bloody stools  + left flank pain  Genito-Urinary ROS: + dysuria, burning with urination   Musculoskeletal ROS: negative for gait disturbance or muscular weakness  Neurological ROS: no syncope or seizures; no ataxia  Dermatological ROS: + pruritis,  No rash no  jaundice      PAST HISTORY:     Past Medical History:   Diagnosis Date    Encounter for blood transfusion        No past surgical history on file.    No family history on file.      MEDICATIONS & ALLERGIES:     Current Outpatient Medications on File Prior to Visit   Medication Sig Dispense Refill    ciprofloxacin HCl (CIPRO) 500 MG tablet Take 1 tablet (500 mg total) by mouth every 12 (twelve) hours. for 12 days 24 tablet 0    ibuprofen (ADVIL,MOTRIN) 800 MG tablet Take 1 tablet (800 mg total) by mouth 2 (two) times daily as needed for Pain. (Patient not taking: Reported on 9/17/2020) 60 tablet 1    ketoconazole (NIZORAL) 2 % shampoo Apply topically twice a week. (Patient not taking: Reported on 9/17/2020) 120 mL 4    MIRENA 20 mcg/24 hr (5 years) IUD TO BE INSERTED ONE TIME BY PRESCRIBER. ROUTE INTRAUTERINE.  0    mupirocin (BACTROBAN) 2 % ointment Apply topically 3 (three) times daily. (Patient not taking: Reported on 9/17/2020) 30 g 1    naltrexone-bupropion (CONTRAVE) 8-90 mg TbSR Take 1 tablet by mouth 2 (two) times daily. (Patient not taking: Reported on 9/17/2020) 60 tablet 1    ondansetron (ZOFRAN) 8 MG tablet Take 1 tablet (8 mg total) by mouth every 8 (eight) hours as needed for Nausea. (Patient not taking: Reported on 9/17/2020) 30 tablet 0    sertraline (ZOLOFT) 50 MG tablet Take 1 tablet (50 mg total) by mouth once daily. (Patient not taking: Reported on 9/17/2020) 30 tablet 11     No current facility-administered medications on file prior to visit.         Review of patient's allergies indicates:  No Known Allergies    OBJECTIVE:      Vital Signs:  [facility] 09/12/2020   Wt Readings from Last 1 Encounters:   09/14/20 2353 74.5 kg (164 lb 3.9 oz)   09/14/20 1827 74.8 kg (165 lb)     There is no height or weight on file to calculate BMI.        Physical Exam:  [facility] 09/12/2020   General appearance: alert, cooperative, no distress  Constitutional:Oriented to person, place, and time  + appears well-developed and well-nourished.   HEENT: Normocephalic, atraumatic, neck symmetrical, no nasal discharge   Eyes: conjunctivae/corneas clear, PERRL, EOM's intact  Lungs: clear to auscultation bilaterally, no dullness to percussion bilaterally  Heart: regular rate and rhythm without rub; no displacement of the PMI   Abdomen: soft, non-tender; bowel sounds normoactive; no organomegaly  + left costovertebral angle tenderness   Extremities: extremities symmetric; no clubbing, cyanosis, or edema  + excoriations noted   Integument: Skin color, texture, turgor normal; no rashes; hair distrubution normal  Neurologic: Alert and oriented X 3, normal strength, normal coordination and gait  Psychiatric: no pressured speech; normal affect; no evidence of impaired cognition     Laboratory  Lab Results   Component Value Date    WBC 8.20 09/15/2020    HGB 11.3 (L) 09/15/2020    HCT 34.1 (L) 09/15/2020    MCV 87 09/15/2020     09/15/2020     BMP  Lab Results   Component Value Date     09/15/2020    K 3.7 09/15/2020     09/15/2020    CO2 25 09/15/2020    BUN 7 09/15/2020    CREATININE 0.6 09/15/2020    CALCIUM 8.2 (L) 09/15/2020    ANIONGAP 5 (L) 09/15/2020    ESTGFRAFRICA >60 09/15/2020    EGFRNONAA >60 09/15/2020     Lab Results   Component Value Date    ALT 43 09/15/2020    AST 28 09/15/2020    ALKPHOS 80 09/15/2020    BILITOT 0.3 09/15/2020     No results found for: INR, PROTIME  Lab Results   Component Value Date    HGBA1C 5.1 12/06/2016       TRANSITION OF CARE:     [facility] Contact Note: 9/17/2020     Family and/or Caretaker present at visit?   No.  Diagnostic tests reviewed/disposition: I have reviewed all completed as well as pending diagnostic tests at the time of discharge.  Disease/illness education: Yes   Home health/community services discussion/referrals: Patient does not have home health established from hospital visit.  They do not need home health.  If needed, we will set up home health for the patient.   Establishment or re-establishment of referral orders for community resources: No other necessary community resources.   Discussion with other health care providers: No discussion with other health care providers necessary.     ASSESSMENT & PLAN:     Pyelonephritis  E coli bacteremia  Complicated UTI (urinary tract infection)  - continues to be symptomatic with dysuria and left flank pain  - etiology of pruritis unclear  - change ABX to Augmentin, check UA/Cx, and Renal US  -     Urinalysis, Reflex to Urine Culture Urine, Clean Catch; Future; Expected date: 09/24/2020  -      Kidney; Future; Expected date: 09/24/2020  -     amoxicillin-clavulanate 875-125mg (AUGMENTIN) 875-125 mg per tablet; Take 1 tablet by mouth 2 (two) times daily. for 7 days  Dispense: 14 tablet; Refill: 0    Chronic nonintractable headache, unspecified headache type  - chronic, not responding to OTC NSAIDS or tylenol  - patient requesting Neurology eval  -     Ambulatory referral/consult to Neurology; Future; Expected date: 10/01/2020    I will [facility] again in [facility] 9/30/2020.    Instructions for the patient:      Scheduled Follow-up :  Future Appointments   Date Time Provider Department Center   9/24/2020 11:10 AM SAME DAY LAB, [place] [facility]   9/30/2020  2:45 PM [facility] US2 [facility] USOUNDO [facility]   10/5/2020 10:30 AM Cindy Junior MD [facility] IMPRI [facility]   12/14/2020 10:00 AM [facility] NEURO [facility]   1/13/2021 11:30 AM Alexx Apodaca MD [facility]       Post Visit Medication List:      Medication List          Accurate as of September 24, 2020 10:17 AM. If you have any questions, ask your nurse or doctor.            START taking these medications    amoxicillin-clavulanate 875-125mg 875-125 mg per tablet  Commonly known as: AUGMENTIN  Take 1 tablet by mouth 2 (two) times daily. for 7 days  Started by: Cindy Junior MD        CONTINUE taking these medications    CONTRAVE 8-90 mg Tbsr  Generic drug: naltrexone-bupropion  Take 1 tablet by mouth 2 (two) times daily.     ibuprofen 800 MG tablet  Commonly known as: ADVIL,MOTRIN  Take 1 tablet (800 mg total) by mouth 2 (two) times daily as needed for Pain.     ketoconazole 2 % shampoo  Commonly known as: NIZORAL  Apply topically twice a week.     MIRENA 20 mcg/24 hours (5 yrs) 52 mg IUD  Generic drug: levonorgestreL     mupirocin 2 % ointment  Commonly known as: BACTROBAN  Apply topically 3 (three) times daily.     ondansetron 8 MG tablet  Commonly known as: ZOFRAN  Take 1 tablet (8 mg total) by mouth every 8 (eight) hours as needed for Nausea.     sertraline 50 MG tablet  Commonly known as: ZOLOFT  Take 1 tablet (50 mg total) by mouth once daily.        STOP taking these medications    ciprofloxacin HCl 500 MG tablet  Commonly known as: CIPRO  Stopped by: Cindy Junior MD           Where to Get Your Medications      These medications were sent to [facility]  [address], [place] [address]    Hours: Mon-Fri, 8a-5:30p Phone: [phone]   · amoxicillin-clavulanate 875-125mg 875-125 mg per tablet         Signing Physician:  Cindy Junior MD

## 2020-09-29 ENCOUNTER — TELEPHONE (OUTPATIENT)
Dept: OBSTETRICS AND GYNECOLOGY | Facility: CLINIC | Age: 30
End: 2020-09-29

## 2020-09-29 DIAGNOSIS — B37.31 VAGINAL YEAST INFECTION: Primary | ICD-10-CM

## 2020-09-30 RX ORDER — FLUCONAZOLE 150 MG/1
150 TABLET ORAL
Qty: 3 TABLET | Refills: 0 | Status: ON HOLD | OUTPATIENT
Start: 2020-09-30 | End: 2020-11-16 | Stop reason: CLARIF

## 2020-10-14 ENCOUNTER — OFFICE VISIT (OUTPATIENT)
Dept: OBSTETRICS AND GYNECOLOGY | Facility: CLINIC | Age: 30
End: 2020-10-14
Payer: COMMERCIAL

## 2020-10-14 VITALS
BODY MASS INDEX: 29.84 KG/M2 | SYSTOLIC BLOOD PRESSURE: 112 MMHG | DIASTOLIC BLOOD PRESSURE: 78 MMHG | WEIGHT: 163.13 LBS

## 2020-10-14 DIAGNOSIS — N76.0 ACUTE VAGINITIS: ICD-10-CM

## 2020-10-14 DIAGNOSIS — Z01.419 ROUTINE GYNECOLOGICAL EXAMINATION: Primary | ICD-10-CM

## 2020-10-14 DIAGNOSIS — Z12.4 CERVICAL CANCER SCREENING: ICD-10-CM

## 2020-10-14 DIAGNOSIS — Z30.431 IUD CHECK UP: ICD-10-CM

## 2020-10-14 PROCEDURE — 99999 PR PBB SHADOW E&M-EST. PATIENT-LVL III: CPT | Mod: PBBFAC,,, | Performed by: OBSTETRICS & GYNECOLOGY

## 2020-10-14 PROCEDURE — 99395 PR PREVENTIVE VISIT,EST,18-39: ICD-10-PCS | Mod: S$GLB,,, | Performed by: OBSTETRICS & GYNECOLOGY

## 2020-10-14 PROCEDURE — 99999 PR PBB SHADOW E&M-EST. PATIENT-LVL III: ICD-10-PCS | Mod: PBBFAC,,, | Performed by: OBSTETRICS & GYNECOLOGY

## 2020-10-14 PROCEDURE — 99395 PREV VISIT EST AGE 18-39: CPT | Mod: S$GLB,,, | Performed by: OBSTETRICS & GYNECOLOGY

## 2020-10-14 PROCEDURE — 87624 HPV HI-RISK TYP POOLED RSLT: CPT

## 2020-10-14 PROCEDURE — 87491 CHLMYD TRACH DNA AMP PROBE: CPT

## 2020-10-14 PROCEDURE — 88175 CYTOPATH C/V AUTO FLUID REDO: CPT

## 2020-10-14 PROCEDURE — 87086 URINE CULTURE/COLONY COUNT: CPT

## 2020-10-14 RX ORDER — FLUCONAZOLE 150 MG/1
150 TABLET ORAL
Qty: 2 TABLET | Refills: 0 | Status: ON HOLD | OUTPATIENT
Start: 2020-10-14 | End: 2020-11-16 | Stop reason: CLARIF

## 2020-10-14 NOTE — PROGRESS NOTES
31 yo  Kittitian speaking female who presents for routine gyn visit.  Complains of sensitive skin today.  Complains of vaginal itching.  Reports being in hospital about 3 weeks ago for treatment of pyelonephritis.  Was on IV abx and then discharged with 12 days of PO antibiotics.  Patient reports vaginal itching since stopping meds.  She also reports having blood when she wipes herself.    Has Mirena IUD in place since Aug 2017. No cycles with IUD in place - except recently for vaginal spotting.     H/o HSV.    ROS:  GENERAL: Denies weight gain or weight loss. Feeling well overall.   SKIN: Denies rash or lesions.   HEAD: Denies head injury or headache.   CHEST: Denies chest pain or shortness of breath.   CARDIOVASCULAR: Denies palpitations or left sided chest pain.   ABDOMEN: No abdominal pain, constipation, diarrhea, nausea, vomiting or rectal bleeding.   URINARY: No frequency, dysuria, hematuria, or burning on urination.  REPRODUCTIVE: See HPI.   BREASTS denies pain, lumps, or nipple discharge.   HEMATOLOGIC: No easy bruisability or excessive bleeding.   MUSCULOSKELETAL: Denies joint pain or swelling.   NEUROLOGIC: Denies syncope or weakness.   PSYCHIATRIC: Denies depression, anxiety or mood swings.         PE:   Vitals: /78   Wt 74 kg (163 lb 2.3 oz)   BMI 29.84 kg/m²   APPEARANCE: Well nourished, well developed, in no acute distress.  SKIN:skin yeast under breasts noted  CHEST: Lungs clear to auscultation.  HEART: Regular rate and rhythm, no murmurs, rubs or gallops.  ABDOMEN: Soft. No tenderness or masses. No hepatosplenomegaly. No hernias.  BREASTS: Symmetrical, no skin changes or visible lesions. No palpable masses, nipple discharge or adenopathy bilaterally.  PELVIC: Normal external female genitalia without lesions. Normal hair distribution. Adequate perineal body, normal urethral meatus. Vagina moist and well rugated without lesions or discharge. Cervix pink and without lesions. No  significant cystocele or rectocele. Bimanual exam showed uterus normal size, shape, position, mobile and nontender. Adnexa without masses or tenderness. Urethra and bladder normal. IUD string visualized. Blood in the vagina    AP  Routine gyn  -s/p normal breast exam:   -s/p normal pelvic exam:   -Pap and HPV: collected  -STD testing: gc/chl ordered  -contraception: Mirena IUD - pelvic US ordered to check placement - if in right location - may need supplemental estrogen to help with breakthrough bleeding  -vaginitis: affirm collected; rx for diflucan provided  -skin itching: at next visit, discuss skin care with patient  -urine cx sent        MAYE Hopkins MD

## 2020-10-15 LAB
CANDIDA RRNA VAG QL PROBE: NEGATIVE
G VAGINALIS RRNA GENITAL QL PROBE: NEGATIVE
T VAGINALIS RRNA GENITAL QL PROBE: NEGATIVE

## 2020-10-17 LAB — BACTERIA UR CULT: NORMAL

## 2020-10-23 ENCOUNTER — OFFICE VISIT (OUTPATIENT)
Dept: OBSTETRICS AND GYNECOLOGY | Facility: CLINIC | Age: 30
End: 2020-10-23
Payer: COMMERCIAL

## 2020-10-23 ENCOUNTER — DOCUMENTATION ONLY (OUTPATIENT)
Dept: PHARMACY | Facility: CLINIC | Age: 30
End: 2020-10-23

## 2020-10-23 ENCOUNTER — PROCEDURE VISIT (OUTPATIENT)
Dept: OBSTETRICS AND GYNECOLOGY | Facility: CLINIC | Age: 30
End: 2020-10-23
Payer: COMMERCIAL

## 2020-10-23 VITALS — DIASTOLIC BLOOD PRESSURE: 60 MMHG | SYSTOLIC BLOOD PRESSURE: 106 MMHG | BODY MASS INDEX: 29.72 KG/M2 | WEIGHT: 162.5 LBS

## 2020-10-23 DIAGNOSIS — Z30.013 ENCOUNTER FOR INITIAL PRESCRIPTION OF INJECTABLE CONTRACEPTIVE: ICD-10-CM

## 2020-10-23 DIAGNOSIS — Z30.431 IUD CHECK UP: ICD-10-CM

## 2020-10-23 DIAGNOSIS — Z30.014 ENCOUNTER FOR INITIAL PRESCRIPTION OF INTRAUTERINE CONTRACEPTIVE DEVICE (IUD): Primary | ICD-10-CM

## 2020-10-23 DIAGNOSIS — Z01.419 ROUTINE GYNECOLOGICAL EXAMINATION: ICD-10-CM

## 2020-10-23 LAB
B-HCG UR QL: NEGATIVE
CTP QC/QA: YES

## 2020-10-23 PROCEDURE — 99999 PR PBB SHADOW E&M-EST. PATIENT-LVL III: CPT | Mod: PBBFAC,,, | Performed by: OBSTETRICS & GYNECOLOGY

## 2020-10-23 PROCEDURE — 99213 OFFICE O/P EST LOW 20 MIN: CPT | Mod: 25,S$GLB,, | Performed by: OBSTETRICS & GYNECOLOGY

## 2020-10-23 PROCEDURE — 99213 PR OFFICE/OUTPT VISIT, EST, LEVL III, 20-29 MIN: ICD-10-PCS | Mod: 25,S$GLB,, | Performed by: OBSTETRICS & GYNECOLOGY

## 2020-10-23 PROCEDURE — 99999 PR PBB SHADOW E&M-EST. PATIENT-LVL III: ICD-10-PCS | Mod: PBBFAC,,, | Performed by: OBSTETRICS & GYNECOLOGY

## 2020-10-23 PROCEDURE — 3008F BODY MASS INDEX DOCD: CPT | Mod: CPTII,S$GLB,, | Performed by: OBSTETRICS & GYNECOLOGY

## 2020-10-23 PROCEDURE — 76856 US EXAM PELVIC COMPLETE: CPT | Mod: S$GLB,,, | Performed by: OBSTETRICS & GYNECOLOGY

## 2020-10-23 PROCEDURE — 76856 PR  ECHO,PELVIC (NONOBSTETRIC): ICD-10-PCS | Mod: S$GLB,,, | Performed by: OBSTETRICS & GYNECOLOGY

## 2020-10-23 PROCEDURE — 3008F PR BODY MASS INDEX (BMI) DOCUMENTED: ICD-10-PCS | Mod: CPTII,S$GLB,, | Performed by: OBSTETRICS & GYNECOLOGY

## 2020-10-23 RX ORDER — MEDROXYPROGESTERONE ACETATE 150 MG/ML
150 INJECTION, SUSPENSION INTRAMUSCULAR
Qty: 1 ML | Refills: 0 | Status: SHIPPED | OUTPATIENT
Start: 2020-10-23 | End: 2020-10-23 | Stop reason: SDUPTHER

## 2020-10-23 RX ORDER — MEDROXYPROGESTERONE ACETATE 150 MG/ML
150 INJECTION, SUSPENSION INTRAMUSCULAR
Qty: 1 ML | Refills: 0 | Status: ON HOLD | OUTPATIENT
Start: 2020-10-23 | End: 2020-11-16 | Stop reason: CLARIF

## 2020-10-23 NOTE — PROGRESS NOTES
Pt received 150 mg IM depo Provera to [facility]OQ on 10/23/2020. Pt tolerated well. Pt instructed to return 01/14/2020. LOT [id number] EXP 06/2022.

## 2020-10-23 NOTE — PROGRESS NOTES
29 yo  Croatian speaking female who presents to discuss contraception management.  Has Mirena IUD in place since Aug 2017. No cycles with IUD in place - except recently for vaginal spotting.  She is s/p pelvic US today that shows IUD in the lower uterine segment.  Office UPT negative.  Patient wants to replacement mirena.  Will order today.  Will have depo provera today for contraception until replacement IUD arrives.    sonya mandujano MD

## 2020-10-26 LAB
HPV HR 12 DNA SPEC QL NAA+PROBE: NEGATIVE
HPV16 AG SPEC QL: NEGATIVE
HPV18 DNA SPEC QL NAA+PROBE: NEGATIVE

## 2020-10-28 ENCOUNTER — TELEPHONE (OUTPATIENT)
Dept: OBSTETRICS AND GYNECOLOGY | Facility: CLINIC | Age: 30
End: 2020-10-28

## 2020-10-28 NOTE — TELEPHONE ENCOUNTER
Informed pt that she may have some cost to pay on iud since she will be changing it out before the 5 year yobani. PT verbalized understanding.

## 2020-10-28 NOTE — TELEPHONE ENCOUNTER
October 27, 2020     The  [id number] - DEVICE AUTHORIZATIONS     - ME LEVONORGESTREL-RELEASING IUD (MIRENA 5YR) 52 MG    17842 (CPT®) - ME INSERT INTRAUTERINE DEVICE    89196 (CPT®) - ME REMOVE, INTRAUTERINE DEVICE order placed by [facility] for Jessi Samson is now AUTHORIZED by [facility] for 1 visit(s). Please schedule the patient before the authorization expiration date, 10/23/2021, and confirm the authorized referral (ID# [id number]) is linked to the visit.     For questions, send an In Basket message to the Pre Service Intake pool or call the [facility] Pre-Service department at [phone]. Pre-Service department hours are M-F 8am to 5pm.        Information for Referral # [id number]:        Diagnoses:    Z30.014 (ICD-10-CM) - Encounter for initial prescription of intrauterine contraceptive device (IUD)             Procedures:    DEVICE AUTHORIZATIONS [YSP036]    ME LEVONORGESTREL-RELEASING IUD (MIRENA 5YR) 52 MG []    ME INSERT INTRAUTERINE DEVICE [19084 (CPT®)]    ME REMOVE, INTRAUTERINE DEVICE [27508 (CPT®)]     Device:    Mirena -

## 2020-11-06 DIAGNOSIS — G44.229 CHRONIC TENSION-TYPE HEADACHE, NOT INTRACTABLE: ICD-10-CM

## 2020-11-06 RX ORDER — IBUPROFEN 800 MG/1
800 TABLET ORAL 2 TIMES DAILY PRN
Qty: 60 TABLET | Refills: 1 | Status: SHIPPED | OUTPATIENT
Start: 2020-11-06 | End: 2021-03-01 | Stop reason: SDUPTHER

## 2020-11-06 NOTE — TELEPHONE ENCOUNTER
----- Message from Kurtis Camp sent at 11/6/2020  3:31 PM CST -----  Regarding: Medication Request  Contact: Self/ [phone]  Patient requesting to speak with you regarding getting a new prescription for ibuprofen. Please advise.

## 2020-11-12 LAB
FINAL PATHOLOGIC DIAGNOSIS: NORMAL
Lab: NORMAL

## 2020-11-13 NOTE — PROGRESS NOTES
Pap and hpv are normal    Your pelvic exam will still need to occur once a year!    See you then!    Dr mandujano

## 2020-11-16 ENCOUNTER — ANESTHESIA (OUTPATIENT)
Dept: SURGERY | Facility: HOSPITAL | Age: 30
End: 2020-11-16
Payer: COMMERCIAL

## 2020-11-16 ENCOUNTER — OFFICE VISIT (OUTPATIENT)
Dept: OBSTETRICS AND GYNECOLOGY | Facility: CLINIC | Age: 30
End: 2020-11-16
Payer: COMMERCIAL

## 2020-11-16 ENCOUNTER — HOSPITAL ENCOUNTER (OUTPATIENT)
Facility: HOSPITAL | Age: 30
Discharge: HOME | End: 2020-11-16
Attending: OBSTETRICS & GYNECOLOGY | Admitting: OBSTETRICS & GYNECOLOGY
Payer: COMMERCIAL

## 2020-11-16 ENCOUNTER — ANESTHESIA EVENT (OUTPATIENT)
Dept: SURGERY | Facility: HOSPITAL | Age: 30
End: 2020-11-16
Payer: COMMERCIAL

## 2020-11-16 VITALS
WEIGHT: 165.56 LBS | SYSTOLIC BLOOD PRESSURE: 100 MMHG | DIASTOLIC BLOOD PRESSURE: 64 MMHG | BODY MASS INDEX: 30.28 KG/M2

## 2020-11-16 VITALS
WEIGHT: 165 LBS | HEIGHT: 61 IN | RESPIRATION RATE: 17 BRPM | OXYGEN SATURATION: 96 % | BODY MASS INDEX: 31.15 KG/M2 | SYSTOLIC BLOOD PRESSURE: 115 MMHG | HEART RATE: 64 BPM | DIASTOLIC BLOOD PRESSURE: 74 MMHG | TEMPERATURE: 98 F

## 2020-11-16 DIAGNOSIS — Z30.432 ENCOUNTER FOR IUD REMOVAL: Primary | ICD-10-CM

## 2020-11-16 DIAGNOSIS — Z30.014 ENCOUNTER FOR INITIAL PRESCRIPTION OF INTRAUTERINE CONTRACEPTIVE DEVICE (IUD): ICD-10-CM

## 2020-11-16 LAB
B-HCG UR QL: NEGATIVE
CTP QC/QA: YES
SARS-COV-2 RDRP RESP QL NAA+PROBE: NEGATIVE

## 2020-11-16 PROCEDURE — 63600175 PHARM REV CODE 636 W HCPCS: Performed by: ANESTHESIOLOGY

## 2020-11-16 PROCEDURE — 58562 PR HYSTEROSCOPY,RMV FB: ICD-10-PCS | Mod: ,,, | Performed by: OBSTETRICS & GYNECOLOGY

## 2020-11-16 PROCEDURE — 71000033 HC RECOVERY, INTIAL HOUR: Performed by: OBSTETRICS & GYNECOLOGY

## 2020-11-16 PROCEDURE — 36000704 HC OR TIME LEV I 1ST 15 MIN: Performed by: OBSTETRICS & GYNECOLOGY

## 2020-11-16 PROCEDURE — 36000705 HC OR TIME LEV I EA ADD 15 MIN: Performed by: OBSTETRICS & GYNECOLOGY

## 2020-11-16 PROCEDURE — U0002 COVID-19 LAB TEST NON-CDC: HCPCS

## 2020-11-16 PROCEDURE — 3008F PR BODY MASS INDEX (BMI) DOCUMENTED: ICD-10-PCS | Mod: CPTII,S$GLB,, | Performed by: OBSTETRICS & GYNECOLOGY

## 2020-11-16 PROCEDURE — 81025 URINE PREGNANCY TEST: CPT | Performed by: OBSTETRICS & GYNECOLOGY

## 2020-11-16 PROCEDURE — 99499 UNLISTED E&M SERVICE: CPT | Mod: S$GLB,,, | Performed by: OBSTETRICS & GYNECOLOGY

## 2020-11-16 PROCEDURE — 63600175 PHARM REV CODE 636 W HCPCS: Performed by: NURSE ANESTHETIST, CERTIFIED REGISTERED

## 2020-11-16 PROCEDURE — 58300 PR INSERT INTRAUTERINE DEVICE: ICD-10-PCS | Mod: ,,, | Performed by: OBSTETRICS & GYNECOLOGY

## 2020-11-16 PROCEDURE — 3008F BODY MASS INDEX DOCD: CPT | Mod: CPTII,S$GLB,, | Performed by: OBSTETRICS & GYNECOLOGY

## 2020-11-16 PROCEDURE — 1126F PR PAIN SEVERITY QUANTIFIED, NO PAIN PRESENT: ICD-10-PCS | Mod: S$GLB,,, | Performed by: OBSTETRICS & GYNECOLOGY

## 2020-11-16 PROCEDURE — 99499 NO LOS: ICD-10-PCS | Mod: S$GLB,,, | Performed by: OBSTETRICS & GYNECOLOGY

## 2020-11-16 PROCEDURE — 99999 PR PBB SHADOW E&M-EST. PATIENT-LVL III: ICD-10-PCS | Mod: PBBFAC,,, | Performed by: OBSTETRICS & GYNECOLOGY

## 2020-11-16 PROCEDURE — 37000009 HC ANESTHESIA EA ADD 15 MINS: Performed by: OBSTETRICS & GYNECOLOGY

## 2020-11-16 PROCEDURE — 37000008 HC ANESTHESIA 1ST 15 MINUTES: Performed by: OBSTETRICS & GYNECOLOGY

## 2020-11-16 PROCEDURE — 1126F AMNT PAIN NOTED NONE PRSNT: CPT | Mod: S$GLB,,, | Performed by: OBSTETRICS & GYNECOLOGY

## 2020-11-16 PROCEDURE — 71000015 HC POSTOP RECOV 1ST HR: Performed by: OBSTETRICS & GYNECOLOGY

## 2020-11-16 PROCEDURE — 99999 PR PBB SHADOW E&M-EST. PATIENT-LVL III: CPT | Mod: PBBFAC,,, | Performed by: OBSTETRICS & GYNECOLOGY

## 2020-11-16 PROCEDURE — 58562 HYSTEROSCOPY REMOVE FB: CPT | Mod: ,,, | Performed by: OBSTETRICS & GYNECOLOGY

## 2020-11-16 PROCEDURE — 25000003 PHARM REV CODE 250: Performed by: NURSE ANESTHETIST, CERTIFIED REGISTERED

## 2020-11-16 PROCEDURE — 58300 INSERT INTRAUTERINE DEVICE: CPT | Mod: ,,, | Performed by: OBSTETRICS & GYNECOLOGY

## 2020-11-16 RX ORDER — MUPIROCIN 20 MG/G
OINTMENT TOPICAL
Status: CANCELLED | OUTPATIENT
Start: 2020-11-16

## 2020-11-16 RX ORDER — SODIUM CHLORIDE, SODIUM LACTATE, POTASSIUM CHLORIDE, CALCIUM CHLORIDE 600; 310; 30; 20 MG/100ML; MG/100ML; MG/100ML; MG/100ML
INJECTION, SOLUTION INTRAVENOUS CONTINUOUS
Status: DISCONTINUED | OUTPATIENT
Start: 2020-11-16 | End: 2020-11-16 | Stop reason: HOSPADM

## 2020-11-16 RX ORDER — PROPOFOL 10 MG/ML
VIAL (ML) INTRAVENOUS
Status: DISCONTINUED | OUTPATIENT
Start: 2020-11-16 | End: 2020-11-16

## 2020-11-16 RX ORDER — ONDANSETRON 8 MG/1
8 TABLET, ORALLY DISINTEGRATING ORAL EVERY 8 HOURS PRN
Status: DISCONTINUED | OUTPATIENT
Start: 2020-11-16 | End: 2020-11-16 | Stop reason: HOSPADM

## 2020-11-16 RX ORDER — MUPIROCIN 20 MG/G
OINTMENT TOPICAL
Status: DISCONTINUED | OUTPATIENT
Start: 2020-11-16 | End: 2020-11-16 | Stop reason: HOSPADM

## 2020-11-16 RX ORDER — LIDOCAINE HCL/PF 100 MG/5ML
SYRINGE (ML) INTRAVENOUS
Status: DISCONTINUED | OUTPATIENT
Start: 2020-11-16 | End: 2020-11-16

## 2020-11-16 RX ORDER — MIDAZOLAM HYDROCHLORIDE 1 MG/ML
INJECTION INTRAMUSCULAR; INTRAVENOUS
Status: DISCONTINUED | OUTPATIENT
Start: 2020-11-16 | End: 2020-11-16

## 2020-11-16 RX ORDER — ONDANSETRON HYDROCHLORIDE 2 MG/ML
INJECTION, SOLUTION INTRAMUSCULAR; INTRAVENOUS
Status: DISCONTINUED | OUTPATIENT
Start: 2020-11-16 | End: 2020-11-16

## 2020-11-16 RX ORDER — SODIUM CHLORIDE, SODIUM LACTATE, POTASSIUM CHLORIDE, CALCIUM CHLORIDE 600; 310; 30; 20 MG/100ML; MG/100ML; MG/100ML; MG/100ML
INJECTION, SOLUTION INTRAVENOUS CONTINUOUS PRN
Status: DISCONTINUED | OUTPATIENT
Start: 2020-11-16 | End: 2020-11-16

## 2020-11-16 RX ORDER — SODIUM CHLORIDE 9 MG/ML
INJECTION, SOLUTION INTRAVENOUS CONTINUOUS
Status: DISCONTINUED | OUTPATIENT
Start: 2020-11-16 | End: 2020-11-16 | Stop reason: HOSPADM

## 2020-11-16 RX ORDER — DIPHENHYDRAMINE HYDROCHLORIDE 50 MG/ML
25 INJECTION INTRAMUSCULAR; INTRAVENOUS EVERY 4 HOURS PRN
Status: DISCONTINUED | OUTPATIENT
Start: 2020-11-16 | End: 2020-11-16 | Stop reason: HOSPADM

## 2020-11-16 RX ORDER — HYDROMORPHONE HYDROCHLORIDE 2 MG/ML
0.4 INJECTION, SOLUTION INTRAMUSCULAR; INTRAVENOUS; SUBCUTANEOUS EVERY 5 MIN PRN
Status: DISCONTINUED | OUTPATIENT
Start: 2020-11-16 | End: 2020-11-16 | Stop reason: HOSPADM

## 2020-11-16 RX ORDER — FENTANYL CITRATE 50 UG/ML
INJECTION, SOLUTION INTRAMUSCULAR; INTRAVENOUS
Status: DISCONTINUED | OUTPATIENT
Start: 2020-11-16 | End: 2020-11-16

## 2020-11-16 RX ORDER — KETOROLAC TROMETHAMINE 30 MG/ML
INJECTION, SOLUTION INTRAMUSCULAR; INTRAVENOUS
Status: DISCONTINUED | OUTPATIENT
Start: 2020-11-16 | End: 2020-11-16

## 2020-11-16 RX ORDER — PROPOFOL 10 MG/ML
VIAL (ML) INTRAVENOUS CONTINUOUS PRN
Status: DISCONTINUED | OUTPATIENT
Start: 2020-11-16 | End: 2020-11-16

## 2020-11-16 RX ORDER — HYDROCODONE BITARTRATE AND ACETAMINOPHEN 5; 325 MG/1; MG/1
1 TABLET ORAL EVERY 4 HOURS PRN
Status: DISCONTINUED | OUTPATIENT
Start: 2020-11-16 | End: 2020-11-16 | Stop reason: HOSPADM

## 2020-11-16 RX ORDER — DIPHENHYDRAMINE HCL 25 MG
25 CAPSULE ORAL EVERY 4 HOURS PRN
Status: DISCONTINUED | OUTPATIENT
Start: 2020-11-16 | End: 2020-11-16 | Stop reason: HOSPADM

## 2020-11-16 RX ORDER — SODIUM CHLORIDE 9 MG/ML
INJECTION, SOLUTION INTRAVENOUS CONTINUOUS
Status: CANCELLED | OUTPATIENT
Start: 2020-11-16

## 2020-11-16 RX ORDER — ONDANSETRON 2 MG/ML
4 INJECTION INTRAMUSCULAR; INTRAVENOUS DAILY PRN
Status: DISCONTINUED | OUTPATIENT
Start: 2020-11-16 | End: 2020-11-16 | Stop reason: HOSPADM

## 2020-11-16 RX ADMIN — KETOROLAC TROMETHAMINE 30 MG: 30 INJECTION, SOLUTION INTRAMUSCULAR; INTRAVENOUS at 04:11

## 2020-11-16 RX ADMIN — FENTANYL CITRATE 50 MCG: 50 INJECTION, SOLUTION INTRAMUSCULAR; INTRAVENOUS at 04:11

## 2020-11-16 RX ADMIN — HYDROMORPHONE HYDROCHLORIDE 0.4 MG: 2 INJECTION, SOLUTION INTRAMUSCULAR; INTRAVENOUS; SUBCUTANEOUS at 06:11

## 2020-11-16 RX ADMIN — FENTANYL CITRATE 25 MCG: 50 INJECTION, SOLUTION INTRAMUSCULAR; INTRAVENOUS at 05:11

## 2020-11-16 RX ADMIN — LIDOCAINE HYDROCHLORIDE 50 MG: 20 INJECTION, SOLUTION INTRAVENOUS at 04:11

## 2020-11-16 RX ADMIN — PROPOFOL 150 MCG/KG/MIN: 10 INJECTION, EMULSION INTRAVENOUS at 04:11

## 2020-11-16 RX ADMIN — MIDAZOLAM HYDROCHLORIDE 2 MG: 1 INJECTION, SOLUTION INTRAMUSCULAR; INTRAVENOUS at 04:11

## 2020-11-16 RX ADMIN — PROPOFOL 50 MG: 10 INJECTION, EMULSION INTRAVENOUS at 04:11

## 2020-11-16 RX ADMIN — ONDANSETRON 8 MG: 2 INJECTION, SOLUTION INTRAMUSCULAR; INTRAVENOUS at 04:11

## 2020-11-16 RX ADMIN — SODIUM CHLORIDE, SODIUM LACTATE, POTASSIUM CHLORIDE, AND CALCIUM CHLORIDE: .6; .31; .03; .02 INJECTION, SOLUTION INTRAVENOUS at 04:11

## 2020-11-16 NOTE — H&P (VIEW-ONLY)
31 yo female who presents for IUD removal and reinsertion. Unable to remove IUD in the office - as it appears to be stuck (strings visualized) but unable to remove IUD in the office. Patient is crying from pain. Procedure stopped in the office. Recommend removal in the OR. Will see if I can complete this today.    Diagnosis: retained Mirena IUD  Planned Procedure: hysteroscopic removal and then insertion of Mirena IUD  Date of Planned Procedure: 2020    HPI: Jessi Samson is a 30 y.o. female with history of retained intrauterine IUD.    ROS:  GENERAL: Denies weight gain or weight loss. Feeling well overall.   SKIN: Denies rash or lesions.   HEAD: Denies head injury or headache.   CHEST: Denies chest pain or shortness of breath.   CARDIOVASCULAR: Denies palpitations or left sided chest pain.   ABDOMEN: No abdominal pain, constipation, diarrhea, nausea, vomiting or rectal bleeding.   URINARY: No frequency, dysuria, hematuria, or burning on urination.  REPRODUCTIVE: See HPI.   HEMATOLOGIC: No easy bruisability or excessive bleeding.   MUSCULOSKELETAL: Denies joint pain or swelling.   NEUROLOGIC: Denies syncope or weakness.   PSYCHIATRIC: Denies depression, anxiety or mood swings.     PMHx:   Past Medical History:   Diagnosis Date    Encounter for blood transfusion        Surgical hx: No past surgical history on file.    GYNhx: No LMP recorded. Patient has had an implant.    Obhx:     ALLERGY: NKDA    MEDS: Reviewed, reconciled      Current Outpatient Medications:     fluconazole (DIFLUCAN) 150 MG Tab, Take 1 tablet (150 mg total) by mouth every 72 hours. (Patient not taking: Reported on 10/14/2020), Disp: 3 tablet, Rfl: 0    fluconazole (DIFLUCAN) 150 MG Tab, Take 1 tablet (150 mg total) by mouth every 72 hours. (Patient not taking: Reported on 10/23/2020), Disp: 2 tablet, Rfl: 0    ibuprofen (ADVIL,MOTRIN) 800 MG tablet, Take 1 tablet (800 mg total) by mouth 2 (two) times daily as needed for  Pain. (Patient not taking: Reported on 11/16/2020), Disp: 60 tablet, Rfl: 1    ketoconazole (NIZORAL) 2 % shampoo, Apply topically twice a week. (Patient not taking: Reported on 9/17/2020), Disp: 120 mL, Rfl: 4    medroxyPROGESTERone (DEPO-PROVERA) 150 mg/mL Syrg, Inject 1 mL (150 mg total) into the muscle every 3 (three) months. (Patient not taking: Reported on 11/16/2020), Disp: 1 mL, Rfl: 0    MIRENA 20 mcg/24 hr (5 years) IUD, TO BE INSERTED ONE TIME BY PRESCRIBER. ROUTE INTRAUTERINE., Disp: , Rfl: 0    mupirocin (BACTROBAN) 2 % ointment, Apply topically 3 (three) times daily. (Patient not taking: Reported on 9/17/2020), Disp: 30 g, Rfl: 1    naltrexone-bupropion (CONTRAVE) 8-90 mg TbSR, Take 1 tablet by mouth 2 (two) times daily. (Patient not taking: Reported on 9/17/2020), Disp: 60 tablet, Rfl: 1    ondansetron (ZOFRAN) 8 MG tablet, Take 1 tablet (8 mg total) by mouth every 8 (eight) hours as needed for Nausea. (Patient not taking: Reported on 9/17/2020), Disp: 30 tablet, Rfl: 0    sertraline (ZOLOFT) 50 MG tablet, Take 1 tablet (50 mg total) by mouth once daily. (Patient not taking: Reported on 9/17/2020), Disp: 30 tablet, Rfl: 11    Social hx:    Social History     Socioeconomic History    Marital status:      Spouse name: Not on file    Number of children: Not on file    Years of education: Not on file    Highest education level: Not on file   Occupational History    Not on file   Social Needs    Financial resource strain: Not hard at all    Food insecurity     Worry: Never true     Inability: Patient refused    Transportation needs     Medical: Not on file     Non-medical: Not on file   Tobacco Use    Smoking status: Never Smoker    Smokeless tobacco: Never Used   Substance and Sexual Activity    Alcohol use: No     Comment: occassionaly    Drug use: No    Sexual activity: Yes     Partners: Male     Birth control/protection: None, I.U.D.   Lifestyle    Physical activity     Days  per week: Not on file     Minutes per session: Not on file    Stress: Not on file   Relationships    Social connections     Talks on phone: Never     Gets together: Never     Attends [employer] service: Not on file     Active member of club or organization: No     Attends meetings of clubs or organizations: Never     Relationship status:    Other Topics Concern    Not on file   Social History Narrative    Not on file       Family hx:  No significant PMH    PE:   /64   Wt 75.1 kg (165 lb 9.1 oz)   BMI 30.28 kg/m²   APPEARANCE: Well nourished, well developed, in no acute distress.  Pelvic exam: IUD string grasped - unable to remove in the office after several tries. Patient with bleeding and pain, crying in the office.  Attempt for removal stopped.      A/P: Jessi Samson is a 30 y.o. female who presents for IUD removal in the office, unsuccessful.    1) Surgery:   -Risks and benefits of surgery discussed with the patient.  All questions were answered.  Consents for surgery and blood were signed by the patient today.  -Patient has been NPO since 8am today  -Preop labs ordered: UPT    Will proceed to OR for removal of retained iUD and insertion of replacement IUD.    LUMA Hopkins MD

## 2020-11-16 NOTE — ANESTHESIA PREPROCEDURE EVALUATION
11/16/2020  Jessi Samson is a 30 y.o., female for IUD removal attempt in clinic today, now with bleeding remova of IUD under GA. Yi speaking, [place]. NPO 8am     Past Medical History:   Diagnosis Date    Encounter for blood transfusion      No past surgical history on file.      Anesthesia Evaluation    I have reviewed the Patient Summary Reports.   I have reviewed the NPO Status.   I have reviewed the Medications.     Review of Systems  Anesthesia Hx:   Denies Personal Hx of Anesthesia complications.   Social:  Non-Smoker    Cardiovascular:  Cardiovascular Normal     Pulmonary:  Pulmonary Normal        Physical Exam  General:  Obesity    Airway/Jaw/Neck:  Airway Findings: Mouth Opening: Normal Tongue: Normal  General Airway Assessment: Adult  Mallampati: II      Dental:  Dental Findings: Periodontal disease, Mild   Chest/Lungs:  Chest/Lungs Clear    Heart/Vascular:  Heart Findings: Normal       Mental Status:  Mental Status Findings:  Alert and Oriented         Anesthesia Plan  Type of Anesthesia, risks & benefits discussed:  Anesthesia Type:  general  Patient's Preference:   Intra-op Monitoring Plan: standard ASA monitors  Intra-op Monitoring Plan Comments:   Post Op Pain Control Plan: multimodal analgesia  Post Op Pain Control Plan Comments:   Induction:    Beta Blocker:  Patient is not currently on a Beta-Blocker (No further documentation required).       Informed Consent: Patient understands risks and agrees with Anesthesia plan.  Questions answered. Anesthesia consent signed with patient.  ASA Score: 2     Day of Surgery Review of History & Physical:            Ready For Surgery From Anesthesia Perspective.        :    Patient is from [facility]. I called CEE Davenport at Gunnison Valley CEE Woods at [facility] 

## 2020-11-16 NOTE — PROGRESS NOTES
31 yo female who presents for IUD removal and reinsertion. Unable to remove IUD in the office - as it appears to be stuck (strings visualized) but unable to remove IUD in the office. Patient is crying from pain. Procedure stopped in the office. Recommend removal in the OR. Will see if I can complete this today.    Diagnosis: retained Mirena IUD  Planned Procedure: hysteroscopic removal and then insertion of Mirena IUD  Date of Planned Procedure: 2020    HPI: Jessi Samson is a 30 y.o. female with history of retained intrauterine IUD.    ROS:  GENERAL: Denies weight gain or weight loss. Feeling well overall.   SKIN: Denies rash or lesions.   HEAD: Denies head injury or headache.   CHEST: Denies chest pain or shortness of breath.   CARDIOVASCULAR: Denies palpitations or left sided chest pain.   ABDOMEN: No abdominal pain, constipation, diarrhea, nausea, vomiting or rectal bleeding.   URINARY: No frequency, dysuria, hematuria, or burning on urination.  REPRODUCTIVE: See HPI.   HEMATOLOGIC: No easy bruisability or excessive bleeding.   MUSCULOSKELETAL: Denies joint pain or swelling.   NEUROLOGIC: Denies syncope or weakness.   PSYCHIATRIC: Denies depression, anxiety or mood swings.     PMHx:   Past Medical History:   Diagnosis Date    Encounter for blood transfusion        Surgical hx: No past surgical history on file.    GYNhx: No LMP recorded. Patient has had an implant.    Obhx:     ALLERGY: NKDA    MEDS: Reviewed, reconciled      Current Outpatient Medications:     fluconazole (DIFLUCAN) 150 MG Tab, Take 1 tablet (150 mg total) by mouth every 72 hours. (Patient not taking: Reported on 10/14/2020), Disp: 3 tablet, Rfl: 0    fluconazole (DIFLUCAN) 150 MG Tab, Take 1 tablet (150 mg total) by mouth every 72 hours. (Patient not taking: Reported on 10/23/2020), Disp: 2 tablet, Rfl: 0    ibuprofen (ADVIL,MOTRIN) 800 MG tablet, Take 1 tablet (800 mg total) by mouth 2 (two) times daily as needed for  Pain. (Patient not taking: Reported on 11/16/2020), Disp: 60 tablet, Rfl: 1    ketoconazole (NIZORAL) 2 % shampoo, Apply topically twice a week. (Patient not taking: Reported on 9/17/2020), Disp: 120 mL, Rfl: 4    medroxyPROGESTERone (DEPO-PROVERA) 150 mg/mL Syrg, Inject 1 mL (150 mg total) into the muscle every 3 (three) months. (Patient not taking: Reported on 11/16/2020), Disp: 1 mL, Rfl: 0    MIRENA 20 mcg/24 hr (5 years) IUD, TO BE INSERTED ONE TIME BY PRESCRIBER. ROUTE INTRAUTERINE., Disp: , Rfl: 0    mupirocin (BACTROBAN) 2 % ointment, Apply topically 3 (three) times daily. (Patient not taking: Reported on 9/17/2020), Disp: 30 g, Rfl: 1    naltrexone-bupropion (CONTRAVE) 8-90 mg TbSR, Take 1 tablet by mouth 2 (two) times daily. (Patient not taking: Reported on 9/17/2020), Disp: 60 tablet, Rfl: 1    ondansetron (ZOFRAN) 8 MG tablet, Take 1 tablet (8 mg total) by mouth every 8 (eight) hours as needed for Nausea. (Patient not taking: Reported on 9/17/2020), Disp: 30 tablet, Rfl: 0    sertraline (ZOLOFT) 50 MG tablet, Take 1 tablet (50 mg total) by mouth once daily. (Patient not taking: Reported on 9/17/2020), Disp: 30 tablet, Rfl: 11    Social hx:    Social History     Socioeconomic History    Marital status:      Spouse name: Not on file    Number of children: Not on file    Years of education: Not on file    Highest education level: Not on file   Occupational History    Not on file   Social Needs    Financial resource strain: Not hard at all    Food insecurity     Worry: Never true     Inability: Patient refused    Transportation needs     Medical: Not on file     Non-medical: Not on file   Tobacco Use    Smoking status: Never Smoker    Smokeless tobacco: Never Used   Substance and Sexual Activity    Alcohol use: No     Comment: occassionaly    Drug use: No    Sexual activity: Yes     Partners: Male     Birth control/protection: None, I.U.D.   Lifestyle    Physical activity     Days  per week: Not on file     Minutes per session: Not on file    Stress: Not on file   Relationships    Social connections     Talks on phone: Never     Gets together: Never     Attends Zoroastrianism service: Not on file     Active member of club or organization: No     Attends meetings of clubs or organizations: Never     Relationship status:    Other Topics Concern    Not on file   Social History Narrative    Not on file       Family hx:  No significant PMH    PE:   /64   Wt 75.1 kg (165 lb 9.1 oz)   BMI 30.28 kg/m²   APPEARANCE: Well nourished, well developed, in no acute distress.  Pelvic exam: IUD string grasped - unable to remove in the office after several tries. Patient with bleeding and pain, crying in the office.  Attempt for removal stopped.      A/P: Jessi Samson is a 30 y.o. female who presents for IUD removal in the office, unsuccessful.    1) Surgery:   -Risks and benefits of surgery discussed with the patient.  All questions were answered.  Consents for surgery and blood were signed by the patient today.  -Patient has been NPO since 8am today  -Preop labs ordered: UPT    Will proceed to OR for removal of retained iUD and insertion of replacement IUD.    LUMA Hopkins MD

## 2020-11-17 DIAGNOSIS — N94.6 DYSMENORRHEA: Primary | ICD-10-CM

## 2020-11-17 RX ORDER — IBUPROFEN 800 MG/1
800 TABLET ORAL EVERY 8 HOURS PRN
Qty: 30 TABLET | Refills: 0 | Status: SHIPPED | OUTPATIENT
Start: 2020-11-17 | End: 2020-12-02

## 2020-11-17 NOTE — PROGRESS NOTES
Patient's IUD inserted in the OR. Given that this is a buy and bill mirena IUD - will add info to her chart.

## 2020-11-17 NOTE — TRANSFER OF CARE
"Anesthesia Transfer of Care Note    Patient: Jessi Samson    Procedure(s) Performed: Procedure(s) (LRB):  REMOVAL, INTRAUTERINE DEVICE (N/A)    Patient location: PACU    Anesthesia Type: MAC    Transport from OR: Transported from OR on room air with adequate spontaneous ventilation    Post pain: adequate analgesia    Post assessment: no apparent anesthetic complications and tolerated procedure well    Post vital signs: stable    Level of consciousness: awake, alert and oriented    Nausea/Vomiting: no nausea/vomiting    Complications: none    Transfer of care protocol was followed      Last vitals:   Visit Vitals  /72 (BP Location: Left arm, Patient Position: Lying)   Pulse 67   Temp 36.5 °C (97.7 °F) (Tympanic)   Resp 17   Ht 5' 1" (1.549 m)   Wt 74.8 kg (165 lb)   SpO2 99%   Breastfeeding No   BMI 31.18 kg/m²     "

## 2020-11-17 NOTE — PROGRESS NOTES
Patient to come back for visit on dec 2 at 10am for postop visit  rx for motrin sent to help with menstrual like cramps.    sonya mandujano MD

## 2020-11-17 NOTE — PLAN OF CARE
Spoke with  and gave all discharge instructions to him as directed by patient she refused ,  speak english very well, patient urinated with no issues, pain controlled, all instructions printed in [place], discharged safely in [place]

## 2020-11-17 NOTE — OP NOTE
Operative Note    Date: 2020  Time: 6:27 PM  Preoperative Diagnosis: Retained Mirena IUD  Postoperative Diagnosis: same  Procedure:   Hysteroscopic removal of mirena IUD  Insertion of replacement Mirena IUD    Type of Anesthesia: MAC  Surgeon: Long Hopkins MD  Specimen/Tissue Removed: retained mirena IUD  Estimated Blood Loss: min  Fluid Deficit: 500 cc NS  Urine Output: min  Complications: none  Findings: Mirena IUD embedded in the patient's cervical side wall on her right side; uterus sounded to 7cm.    TECHNIQUE:  The patient was taken to the Operating Room where her MAC   anesthesia was found to be adequate.  Her legs were placed in John stirrups.    She was then prepared and draped in normal sterile fashion.  Speculum was placed   into the vagina where the cervix was visualized.  Single-tooth tenaculum was   used to grasp at the anterior cervix.  The hysteroscope was then placed into the uterus where the aforementioned findings were noted.  The hysteroscopy forceps were used to disempack the IUD that was embedded into the cervical side wall.  Once the IUD was successfully removed, a replacement IUD was put into the uterus.      MIRENA INSERTION:  Date:2020  Name of the procedure: Mirena Insertion  Indications: Jessi Samson is a 30 y.o. female  who presents today for insertion of Mirena.  No LMP recorded. Patient has had an implant..      PRE-Mirena INSERTION COUNSELING:  All contraceptive options were reviewed with the patient prior to her surgery and the patient chooses Mirena.Patients history was reviewed and there were no contraindications to Mirena.  The procedure and minimal risks of infection, bleeding, and uterine perforation at at  insertion have been discussed. Possible irregular menstrual bleeding pattern versus amenorrhea was explained. No protection against STDs discussed.    Labs:    Office urine pregnancy test negative;     Procedure:   Endometrial pipelle  inserted and uterus sounded to 7 cm. Mirena IUD then inserted using standard technique. IUD string cut about 4cm in length.  Tenaculum removed and hemostasis acheived. All instruments removed from the vagina.  Patient tolerated the procedure well.    Complications: none      LOT number: [id number]  Exp date: dec 2022  NDC:  [id number]    sonya Hopkins MD

## 2020-11-17 NOTE — DISCHARGE SUMMARY
[facility]  Discharge Note  Short Stay    Procedure(s) (LRB):  REMOVAL, INTRAUTERINE DEVICE (N/A)    OUTCOME: Condition has improved and patient is now ready for discharge.    DISPOSITION: Home or Self Care    FINAL DIAGNOSIS:  Encounter for IUD removal and insertion of replacement Mirena IUD    FOLLOWUP: In clinic    DISCHARGE INSTRUCTIONS:    Discharge Procedure Orders   Diet general

## 2020-11-17 NOTE — INTERVAL H&P NOTE
The patient has been examined and the H&P has been reviewed:    I concur with the findings and no changes have occurred since H&P was written.    Surgery risks, benefits and alternative options discussed and understood by patient/family.          Active Hospital Problems    Diagnosis  POA    *Encounter for IUD removal [Z30.432]  Not Applicable      Resolved Hospital Problems   No resolved problems to display.

## 2020-11-17 NOTE — ANESTHESIA POSTPROCEDURE EVALUATION
Anesthesia Post Evaluation    Patient: Jessi Samson    Procedure(s) Performed: Procedure(s) (LRB):  REMOVAL, INTRAUTERINE DEVICE (N/A)    Final Anesthesia Type: MAC    Patient location during evaluation: GI PACU  Patient participation: Yes- Able to Participate  Level of consciousness: awake and alert and oriented  Post-procedure vital signs: reviewed and stable  Pain management: adequate  Airway patency: patent    PONV status at discharge: No PONV  Anesthetic complications: no      Cardiovascular status: blood pressure returned to baseline and hemodynamically stable  Respiratory status: unassisted, spontaneous ventilation and room air  Hydration status: euvolemic  Follow-up not needed.          Vitals Value Taken Time   BP  11/16/20 1801   Temp  11/16/20 1801   Pulse  11/16/20 1801   Resp  11/16/20 1801   SpO2  11/16/20 1801         No case tracking events are documented in the log.      Pain/Goldie Score: No data recorded

## 2020-11-17 NOTE — DISCHARGE INSTRUCTIONS
Instrucciones de darin para marv dilatación y legrado (D y L)  Rosales médico le ha hecho un procedimiento llamado dilatación y legrado. Las razones por las que se realiza paola procedimiento varían de marv persona a otra. La dilatación y legrado puede realizarse para controlar un sangrado uterino muy abundante, para encontrar la causa de un sangrado irregular, para realizar un aborto o para extraer tejido si ha tenido un aborto espontáneo.  Cuidados en la casa  · Tómese las cosas con calma y repose tranquilamente christian 2 días.  · Puede reanudar shelia actividades normales y regresar al trabajo al cabo de 48 horas.  · Siga marv dieta young.  · En reyna necesario, tómese un analgésico sin receta para aliviar el dolor.  · Recuerde que es normal que haya sangrado christian marv semana después del procedimiento. La cantidad de ashlee debe ser similar a la que hay christian marv menstruación normal.  · No levante nada que pese más de 10 libras (4.5 kg) christian 1 semana después de la cirugía.  · No maneje un automóvil hasta que hayan transcurrido 24 horas desde el procedimiento.  · No tenga relaciones sexuales ni use tampones o duchas vaginales hasta que el médico le diga que puede hacerlo sin peligro. [place] suele tardar entre 2 y 6 semanas.  Visitas de control  · Programe marv visita de control según le indique el personal médico.  Cuándo debe llamar al médico  Llame a rosales médico de inmediato si nota cualquiera de estos síntomas:  · Sangrado que empapa más de marv toalla sanitaria en 1 hora  · Dolor abdominal grave  · Cólicos muy vesta  · Fiebre de más de 101.0°F (38.3°C)  · Escalofríos  · Secreción vaginal maloliente   Date Last Reviewed: 5/19/2015  © 3598-1491 [employer]. [address] [place] [address], [place] [address]. Todos los derechos reservados. Esta información no pretende sustituir la atención médica profesional. Sólo rosales médico puede diagnosticar y tratar un problema de rickie.    Control de la natalidad: El dispositivo  intrauterino    El dispositivo intrauterino (abreviado DIU) es un aparato pequeño y flexible con forma de T que un proveedor de atención médica capacitado coloca dentro del útero. El DIU se cuenta entre los métodos anticonceptivos más eficaces y además es reversible, lo que significa que puede ser extraído en cualquier momento por un proveedor de atención médica capacitado. Los nuevos DIU son seguros y no acarrean los riesgos asociados a los DIU más antiguos.  Tasa de embarazo  Hable con rosales proveedor de atención médica acerca de la eficacia de paola método de control de la natalidad.  Tipos de DIU  Hay dos tipos de DIU disponibles:  · El DIU de cobre: libera marv pequeña cantidad de cobre en el útero, lo cual dificulta que los espermatozoides lleguen hasta el óvulo. Paola dispositivo es efectivo louis mínimo christian 10 años.  · El DIU con progestágeno: libera la hormona progestágeno (progesterona sintética), la cual ocasiona cambios en el útero que ayudan a prevenir el embarazo. Paola dispositivo es efectivo louis mínimo christian 5 años y podría recomendarse a mujeres que tienen anemia o menstruaciones abundantes o dolorosas.  Los DIU tienen unos pequeños hilos que cuelgan de la abertura del útero hacia la vagina, los cuales permiten comprobar que el dispositivo permanezca en rosales posición.  Lo que debe saber sobre los DIU  · Pueden usarlos mujeres que nunca leung estado embarazadas o que tienen antecedentes de ITS o de embarazos tubáricos.  · No se desplazarán del útero a ninguna otra parte del cuerpo.  · Acarrean un ligero riesgo de ser expulsados por la vagina.  · Podrían no funcionar en mujeres que tienen un útero de forma anormal.  · El DIU de cobre puede aumentar el flujo menstrual y causar cólicos.  · El DIU que contiene progestágeno puede reducir el flujo menstrual o hacer que desaparezcan las menstruaciones (es posible y normal que se produzca manchado christian los primeros 3 meses).  Asegúrese de que rosales proveedor de  atención médica sepa si usted  · Tiene marv ITS o podría tenerla.  · Tiene problemas del hígado.  · Tiene coágulos de ashlee (sólo en el reyna del DIU que contiene progestágeno).  · Tiene cáncer de seno o antecedentes de cáncer de seno (sólo en el reyna del DIU que contiene progestágeno).   Date Last Reviewed: 5/12/2015  © 2915-8479 [employer]. [address] [place] [address]. Todos los derechos reservados. Esta información no pretende sustituir la atención médica profesional. Sólo rosales médico puede diagnosticar y tratar un problema de rickie.    Anestesia: Anestesia general     Estás vimos continuamente christian el procedimiento por el proveedor de la anestesia.     Usted tiene programada marv fecha para marv operación quirúrgica. Christian la operación, le administrarán un medicamento anestésico (llamado también anestesia) para que esté cómodo y sin dolor. Rosales cirujano le administrará anestesia general. En esta hoja se da más información sobre paola tipo de anestesia.  ¿En qué consiste la anestesia general?  Con la anestesia general usted estará profundamente dormido. La anestesia general se administra en la ashlee (anestesia intravenosa), en los pulmones (anestesia con gas), o de ambas formas. Usted no sentirá nada christian la operación, y tampoco la recordará. El anestesista vigila rosales estado y monitoriza rosales frecuencia y ritmo cardíaco, rosales presión arterial y rosales nivel de oxígeno en la ashlee christian todo el procedimiento.  · Anestesia intravenosa, La anestesia intravenosa se administra mediante marv sonda intravenosa en el brazo, y suele darse keo para que el paciente ya esté dormido cuando le administren la anestesia con gas. Algunos tipos de anestesia intravenosa alivian el dolor, mientras que otros producen relajación. Rosales médico decidirá qué tipo de anestesia es más adecuado para rosales reyna.  · Anestesia con gas. La anestesia con gas se administra en los pulmones por inhalación y suele usarse para  mantener al paciente dormido después de recibir anestesia intravenosa. Puede administrarse a través de marv máscara facial, un tubo endotraqueal o marv máscara laríngea.  ¨ Si tiene marv máscara facial, rosales anestesista se la colocará sobre la nariz y la boca, probablemente mientras usted todavía está despierto. Usted inhalará oxígeno mientras le inician la anestesia intravenosa, y luego puede añadirse anestesia a través de la máscara.  ¨ Si se usa un tubo endotraqueal o marv máscara laríngea, se los colocarán en la garganta marv vez que se haya dormido.  Medicamentos e instrumentos de anestesia  Probablemente tendrá:  · Anestesia intravenosa administrada directamente en la ashlee mediante marv sonda intravenosa.  · Anestesia con gas administrada en los pulmones, desde los cuales pasa a la ashlee.  · Un pulsioxímetro colocado en el extremo de un dedo para medir el nivel de oxígeno en la ashlee.  · Electrodos de electrocardiografía para registrar la frecuencia y el ritmo cardíacos.  · Un manguito (esfigmomanómetro) para medir la presión arterial.  Riesgos y posibles complicaciones  La anestesia general tiene ciertos riesgos, entre los cuales se encuentran los siguientes:  · Problemas de la respiración  · Náuseas y vómito  · Garganta irritada o ronquera  · Reacción alérgica a la anestesia  · Persistencia de la insensibilidad en la [place] anestesiada (poco frecuente)  · Ritmo cardíaco irregular (en casos muy poco frecuentes)  · Paro cardíaco (en casos muy poco frecuentes)   Medidas de seguridad relacionadas con la anestesia  · Siga todas las instrucciones que le hayan dado respecto al tiempo que debe pasar sin comer ni beber antes de la operación.  · Asegúrese de informar a rosales médico de todos los medicamentos que esté tomando, incluyendo también los se adquieren sin receta, las hierbas medicinales y los suplementos alimenticios.  · Póngase de acuerdo con un familiar o amigo adulto para que lo lleve a rosales casa después de la  operación.  · Nadia las primeras 24 horas después de la operación:  ¨ No maneje automóviles ni maquinaria o herramientas pesadas.  ¨ No tome decisiones importantes ni firme ningún documento.  ¨ Evite el alcohol.  ¨ De ser posible, consiga a alguien que se quede con usted para ayudarlo a mantenerse fuera de peligro en reyna de que surja algún problema.  Date Last Reviewed: 10/16/2014  © 2601-4111 [employer]. [address] [place] [id number]. Todos los derechos reservados. Esta información no pretende sustituir la atención médica profesional. Sólo rosales médico puede diagnosticar y tratar un problema de rickie.    Ibuprofen tablets and capsules  ¿Qué es paola medicamento?  El IBUPROFENO es un medicamento antiinflamatorio no esteroideo (ADELINE). Paola medicamento se puede utilizar para el dolor dental, fiebre, dolor de babar o migrañas, artritis reumatoide, osteoartritis y períodos menstruales dolorosos. Junie también los síntomas de molestias y allyson menores provocados por resfríos, gripe, o dolor de garganta.  ¿Cómo anna utilizar paola medicamento?  [place] paola medicamento por vía oral con un vaso de agua. Siga las instrucciones de la etiqueta del medicamento. Si paola medicamento le produce malestar estomacal, tómelo con alimentos. Trate de no acostarse por lo menos 10 minutos después de tomarlo. [place] shelia dosis a intervalos regulares. No tome rosales medicamento con marv frecuencia mayor a la indicada.  Rosales farmacéutico le dará marv Guía del medicamento especial con cada receta y relleno. Asegúrese de leer esta información cada vez cuidadosamente.  Hable con rosales pediatra para informarse acerca del uso de paola medicamento en niños. Puede requerir atención especial.  ¿Qué efectos secundarios puedo tener al utilizar paola medicamento?  Efectos secundarios que debe informar a rosales médico o a rosales profesional de la rickie tan pronto louis sea posible:  · reacciones alérgicas louis erupción cutánea, picazón o urticarias,  hinchazón de la cori, labios o lengua  · dolor de estómago severo  · signos y síntomas de sangrado tales louis heces de color oscuro, con ashlee o con aspecto alquitranado; orina tori o marrón oscura; escupir ashlee o material marrón que parece granos de café; puntos rojos en la piel; sangrado o magulladuras inusuales de los ojos, encías o nariz  · signos y síntomas de un coágulo sanguíneo tales louis cambios en la visión; dolor en el pecho; dolor de babar severa, repentina; dificultad para hablar; entumecimiento o debilidad repentina de la cori, brazo o pierna  · hinchazón o aumento de peso sin explicación  · cansancio o debilidad inusual  · color amarillento de los ojos o la piel  Efectos secundarios que, por lo general, no requieren atención médica (debe informarlos a rosales médico o a rosales profesional de la rickie si persisten o si son molestos):  · magulladuras  · diarrea  · mareos, somnolencia  · dolor de babar  · náuseas,vómito  ¿Qué puede interactuar con paola medicamento?  No tome esta medicina con ninguno de los siguientes medicamentos:  · cidofovir  · quetorolac  · metotrexato  · pemetrexed  Esta medicina también puede interactuar con los siguientes medicamentos:  · alcohol  · aspirina  · diuréticos  · litio  · otros medicamentos para la inflamación, louis prednisona  · warfarina  ¿Qué sucede si me olvido de marv dosis?  Si olvida marv dosis, tómela lo antes posible. Si es dayo la hora de la próxima dosis, tome sólo agustina dosis. No tome dosis adicionales o dobles.  ¿Dónde anna guardar mi medicina?  [place] fuera del alcance de los niños.  Guárdela a temperatura ambiente, entre 15 y 30 grados C (59 y 86 grados F). Mantenga el envase fabricio cerrado. Deseche todo el medicamento que no haya utilizado, después de la fecha de vencimiento.  ¿Qué le anna informar a mi profesional de la rickie antes de jovita paola medicamento?  Necesita saber si usted presenta alguno de los siguientes problemas o  situaciones:  · asma  · fuma  · consume más de 3 bebidas alcohólicas por día  · enfermedad cardiaca o problemas circulatorios, tales louis insuficiencia cardiaca o edema de pierna (retención de líquido)  · darin presión sanguínea  · enfermedad renal  · enfermedad hepática  · úlceras o sangrado estomacal  · marv reacción alérgica o inusual al ibuprofeno, a la aspirina, a otros ADELINE, a otros medicamento, alimentos, colorantes o conservantes  · si está embarazada o buscando quedar embarazada  · si está amamantando a un bebé  ¿A qué anna estar atento al usar paola medicamento?  Consulte a rosales médico o rosales profesional de la rickie si shelia síntomas no comienzan a mejorar o si empeoran.  Paola medicamento no previene ataques cardíacos o derrames cerebrales. De hecho, paola medicamento puede aumentar la posibilidad de padecer un ataque cardíaco o un derrame cerebral. La posibilidad puede aumentar con el uso prolongado de paola medicamento y en pacientes con enfermedad cardiaca. Si está tomando aspirina para la prevención de ataques cardíacos o derrames cerebrales, comuníquese con rosales médico o rosales profesional de la rickie.  Evite jovita otros medicamentos que contienen aspirina, ibuprofeno o naproxeno con paola medicamento. Es probable que se produzcan efectos secundarios, tales louis molestias estomacales, náuseas o úlceras. No debe jovita paola medicamento con muchos medicamentos disponibles de venta yvon.  Paola medicamento puede provocar úlceras y hemorragia del estómago e intestinos en cualquier momento christian tratamiento. Pueden ocurrir úlceras y hemorragia sin síntomas de alerta y pueden provocar la muerte. Para reducir rosales riesgo, no fume ni consume alcohol mientras está tomando paola medicamento.  Puede experimentar mareos o somnolencia. No conduzca ni utilice maquinaria ni rodrigo nada que le exija permanecer en estado de alerta hasta que sepa cómo le afecta paola medicamento. No se siente ni se ponga de pie con rapidez, especialmente si  es un paciente de edad avanzada. [place] reduce el riesgo de mareos o desmayos.  Roshni medicamento puede hacerle sangrar con mayor facilidad. Trate de no lastimarse los dientes y las encías al cepillarlos o limpiarlos con hilo dental.  Roshni medicamento se puede utilizar para tratar migrañas. Si tate medicamentos para migrañas por 10 días o más en un mes, las migrañas se pueden empeorar. Mantenga un diario de días de dolor de babar y el uso de medicamento. Consulte a rosales profesional de la rickie si los ataques de migrañas ocurren con más frecuencia.  © 4163-4338 [email or web address]. [address] [place] [id number]. Todos los derechos reservados. Esta información no pretende sustituir la atención médica profesional. Sólo rosales médico puede diagnosticar y tratar un problema de rickie.

## 2020-12-02 ENCOUNTER — OFFICE VISIT (OUTPATIENT)
Dept: OBSTETRICS AND GYNECOLOGY | Facility: CLINIC | Age: 30
End: 2020-12-02
Payer: COMMERCIAL

## 2020-12-02 VITALS
HEIGHT: 61 IN | WEIGHT: 165.88 LBS | BODY MASS INDEX: 31.32 KG/M2 | DIASTOLIC BLOOD PRESSURE: 68 MMHG | SYSTOLIC BLOOD PRESSURE: 116 MMHG

## 2020-12-02 DIAGNOSIS — Z09 POSTOP CHECK: Primary | ICD-10-CM

## 2020-12-02 DIAGNOSIS — Z30.431 IUD CHECK UP: ICD-10-CM

## 2020-12-02 PROCEDURE — 3008F BODY MASS INDEX DOCD: CPT | Mod: CPTII,S$GLB,, | Performed by: OBSTETRICS & GYNECOLOGY

## 2020-12-02 PROCEDURE — 1126F PR PAIN SEVERITY QUANTIFIED, NO PAIN PRESENT: ICD-10-PCS | Mod: S$GLB,,, | Performed by: OBSTETRICS & GYNECOLOGY

## 2020-12-02 PROCEDURE — 99999 PR PBB SHADOW E&M-EST. PATIENT-LVL II: CPT | Mod: PBBFAC,,, | Performed by: OBSTETRICS & GYNECOLOGY

## 2020-12-02 PROCEDURE — 99024 POSTOP FOLLOW-UP VISIT: CPT | Mod: S$GLB,,, | Performed by: OBSTETRICS & GYNECOLOGY

## 2020-12-02 PROCEDURE — 99999 PR PBB SHADOW E&M-EST. PATIENT-LVL II: ICD-10-PCS | Mod: PBBFAC,,, | Performed by: OBSTETRICS & GYNECOLOGY

## 2020-12-02 PROCEDURE — 99024 PR POST-OP FOLLOW-UP VISIT: ICD-10-PCS | Mod: S$GLB,,, | Performed by: OBSTETRICS & GYNECOLOGY

## 2020-12-02 PROCEDURE — 1126F AMNT PAIN NOTED NONE PRSNT: CPT | Mod: S$GLB,,, | Performed by: OBSTETRICS & GYNECOLOGY

## 2020-12-02 PROCEDURE — 3008F PR BODY MASS INDEX (BMI) DOCUMENTED: ICD-10-PCS | Mod: CPTII,S$GLB,, | Performed by: OBSTETRICS & GYNECOLOGY

## 2020-12-02 NOTE — PROGRESS NOTES
Postop      Date of procedure: 11/16/2020  Preoperative Diagnosis: Retained Mirena IUD  Postoperative Diagnosis: same  Procedure:   Hysteroscopic removal of mirena IUD  Insertion of replacement Mirena IUD     Patient reports pain a few days after surgery. No pain today. No bleeding since insertion. No fever. No chills. No problems with urination.  On vaginal exam, IUD string is clearly visualized.  Patient doing well.    F/u in 10/2021 for annual exam.    sonya mandujano MD

## 2020-12-14 ENCOUNTER — OFFICE VISIT (OUTPATIENT)
Dept: NEUROLOGY | Facility: CLINIC | Age: 30
End: 2020-12-14
Payer: COMMERCIAL

## 2020-12-14 DIAGNOSIS — F41.9 ANXIETY: ICD-10-CM

## 2020-12-14 DIAGNOSIS — G43.019 INTRACTABLE MIGRAINE WITHOUT AURA AND WITHOUT STATUS MIGRAINOSUS: Primary | ICD-10-CM

## 2020-12-14 DIAGNOSIS — M79.18 CERVICAL MYOFASCIAL PAIN SYNDROME: ICD-10-CM

## 2020-12-14 PROCEDURE — 99204 OFFICE O/P NEW MOD 45 MIN: CPT | Mod: S$GLB,,, | Performed by: NURSE PRACTITIONER

## 2020-12-14 PROCEDURE — 99999 PR PBB SHADOW E&M-EST. PATIENT-LVL III: ICD-10-PCS | Mod: PBBFAC,,, | Performed by: NURSE PRACTITIONER

## 2020-12-14 PROCEDURE — 99999 PR PBB SHADOW E&M-EST. PATIENT-LVL III: CPT | Mod: PBBFAC,,, | Performed by: NURSE PRACTITIONER

## 2020-12-14 PROCEDURE — 99204 PR OFFICE/OUTPT VISIT, NEW, LEVL IV, 45-59 MIN: ICD-10-PCS | Mod: S$GLB,,, | Performed by: NURSE PRACTITIONER

## 2020-12-14 RX ORDER — ONDANSETRON 8 MG/1
8 TABLET, ORALLY DISINTEGRATING ORAL EVERY 8 HOURS PRN
Qty: 30 TABLET | Refills: 2 | Status: SHIPPED | OUTPATIENT
Start: 2020-12-14

## 2020-12-14 RX ORDER — RIZATRIPTAN BENZOATE 10 MG/1
TABLET, ORALLY DISINTEGRATING ORAL
Qty: 12 TABLET | Refills: 5 | Status: SHIPPED | OUTPATIENT
Start: 2020-12-14 | End: 2021-03-01 | Stop reason: SDUPTHER

## 2020-12-14 RX ORDER — VENLAFAXINE HYDROCHLORIDE 37.5 MG/1
37.5 CAPSULE, EXTENDED RELEASE ORAL DAILY
Qty: 30 CAPSULE | Refills: 0 | Status: SHIPPED | OUTPATIENT
Start: 2020-12-14 | End: 2021-01-11

## 2020-12-14 NOTE — PROGRESS NOTES
NEW PATIENT CONSULT  SUBJECTIVE:  Patient ID: Jessi Samson   MRN: [id number]  Referred By: Dr. Cindy Junior  Chief Complaint: Consult    History of Present Illness:   30 y.o. female with headaches, who presents to clinic alone for evaluation of headaches.   Long history of headaches, beginning 2+ years ago. However she has been suffering with headaches 3 days per week each of which are lasting all day in duration.  Headaches are described as a severe, pulsating pain located above both eyes, extending towards the top of her head.  Headaches can last anywhere from 6-8 hours up to all day in duration.  Pain can be rated anywhere from 3 to 9 out of 10, intensifying to peak over about an hour.  Associated symptoms include photophobia, phonophobia, nausea, vomiting, lack of appetite.  Vomiting will begin a few hours after onset of headache.  Headaches are debilitating, she is not able to continue with her day to day activity.  Headaches typically begin in the morning, will force her to stay in bed, will take ibuprofen 800 mg every 6 hours which helps some, but does not fully abort her headache.  Currently experiencing some sort of headache on 12/30 days, with migraines occurring on 8-9/30 days.  She has had vision checked recently, got new prescription for glasses, only wears while reading or driving. Triggers - stress, if she gets mad, menstrual cycle (has Mirena IUD, does not get normal menstrual cycle)  Aggravating Factors - light, noise, movement   Alleviating Factors - placing cold towel over her forehead, ibuprofen 800 mg, rest, lying still   Head Trauma? Infection? Fever? Cancer? Pregnancy? <-- denies  Recent Changes - denies   Sleep - no trouble falling asleep, has 2 year old child, occasionally has to wake up in the morning   Family Hx of Migraines <-- mother     History obtained with the assistance of , Connie.     Notes from Internal Medicine:  3/27/2020  - 29 years old female was  evaluated by a telemedicine. Clinical appointment was done by synchronous audio and video at my office. Patient was at her home. Follow-up appointment for headaches .She reports episodic headache for the last year. She is using ibuprofen with significant improvement. Patient with good compliance with the depression medicine. No suicidal or homicidal ideations. Last urine with evidence of blood trace. She denies dysuria, frequency, urgency or flank pain. Patient denies previous kidney workup. No history of kidney stone. Patient also with chronic back pain for the last year. The pain is 3/10 of intensity on and off aggravated with activity better with rest. She is requesting a medicine to help her with her weight. Patient with a BMI of 29. Answers for HPI/ROS submitted by the patient on 3/27/2020     Treatments Tried and Response  effexor -given today   zofran - given today   maxalt - given today     Current Medications:    ibuprofen (ADVIL,MOTRIN) 800 MG tablet, Take 1 tablet (800 mg total) by mouth 2 (two) times daily as needed for Pain., Disp: 60 tablet, Rfl: 1    ibuprofen (ADVIL,MOTRIN) 800 MG tablet, TAKE 1 TABLET(800 MG) BY MOUTH EVERY 8 HOURS AS NEEDED, Disp: 30 tablet, Rfl: 0    MIRENA 20 mcg/24 hr (5 years) IUD, TO BE INSERTED ONE TIME BY PRESCRIBER. ROUTE INTRAUTERINE., Disp: , Rfl: 0    ondansetron (ZOFRAN-ODT) 8 MG TbDL, Take 1 tablet (8 mg total) by mouth every 8 (eight) hours as needed (nausea)., Disp: 30 tablet, Rfl: 2    rizatriptan (MAXALT-MLT) 10 MG disintegrating tablet, Dissolve 1 tab on tongue at onset of migraine, may repeat dose in 2 hours if needed. Max 3 tabs/day., Disp: 12 tablet, Rfl: 5    venlafaxine (EFFEXOR-XR) 37.5 MG 24 hr capsule, Take 1 capsule (37.5 mg total) by mouth once daily., Disp: 30 capsule, Rfl: 0    Review of Systems - A review of 10+ systems was conducted with pertinent positive and negative findings documented in HPI with all other systems reviewed and  negative.    PFSH: Past medical, family, and social history reviewed as documented in chart with pertinent positive medical, family, and social history detailed in HPI.    OBJECTIVE:  Vitals:  There were no vitals taken for this visit.     Physical Exam:  Constitutional: she appears well-developed and well-nourished. she is well groomed. NAD.  HENT:    Head: Normocephalic and atraumatic, oral and nasal mucosa intact.  Right and Left external ear normal. Frontalis was NTTP, temporalis was NTTP   Eyes: Eyelids normal.  Conjunctivae and EOM are normal. Pupils are equal, round, and reactive to light   Neck: Neck supple. Full ROM of neck. Occiput TTP bilat and trapezius TTP bilat   Resp: Respiratory effort is normal.   Musculoskeletal: Normal range of motion. No joint stiffness.   Skin: Skin is warm and dry.  Psychiatric: Normal mood and affect.     Neuro exam:    Mental status:  The patient is alert and oriented to person, place and time.  Language is intact and fluent  Remote and recent memory are intact  Normal attention and concentration  Speech is normal   Knowledge is good (consistent with education)    Cranial Nerves:  Fundoscopic examination does not reveal any occult papilledema.    Pupils are equal and reactive to light.    Extraocular movements are intact and without nystagmus.    Visual fields are full to confrontation testing.   Facial movement is full and symmetric.  Facial sensation is intact.    Hearing is intact to finger rub   Uvula in midline. Tongue in midline without fasciculation.   FROM of neck in all (6) directions.  Shoulder shrug symmetrical.    Motor:  Normal muscle bulk and symmetry. No fasciculations were noted.   Tremor not apparent   Pronator drift not apparent.    strength was strong and symmetric    Finger extension strength was strong and symmetric   RUE:appropriate against gravity and medium force as tested 5/5  LUE: appropriate against gravity and medium force as tested  5/5  RLE:appropriate against gravity and medium force as tested 5/5              LLE: appropriate against gravity and medium force as tested 5/5    Reflexes:  Right Brachioradialis 2+  Left Brachioradialis 2+  Right Biceps 2+  Left Biceps 2+  Right Patellar2+  Left Patellar 2+    Serna was negative bilat    Sensory:  RUE  intact light touch  LUE intact light touch    RLE intact light touch  LLE intact light touch    Gait:   Normal gait    Review of Data:   Notes from OBGYN, priority care, family medicine, ED visits reviewed   Labs:  Admission on 11/16/2020, Discharged on 11/16/2020   Component Date Value Ref Range Status    SARS-CoV-2 RNA, Amplification, Qual 11/16/2020 Negative  Negative Final    POC Preg Test, Ur 11/16/2020 Negative  Negative Final     Acceptable 11/16/2020 Yes   Final   Office Visit on 10/23/2020   Component Date Value Ref Range Status    POC Preg Test, Ur 10/23/2020 Negative  Negative Final     Acceptable 10/23/2020 Yes   Final   Office Visit on 10/14/2020   Component Date Value Ref Range Status    Urine Culture, Routine 10/14/2020 No significant growth   Final    Final Pathologic Diagnosis 10/14/2020    Final                    Value:Specimen Adequacy  Satisfactory for interpretation. Endocervical component is present.  [place] Category  Negative for intraepithelial lesion or malignancy.  Inflammation present.      Disclaimer 10/14/2020    Final                    Value:The Pap smear is a screening test that aids in the detection of cervical  cancer and cancer precursors. Both false positive and false negative results  can occur. The test should be used at regular intervals, and positive results  should be confirmed before definitive therapy.  This liquid based specimen is processed using the T2True Fit or  Thin PrepPAP  System. This specimen has been analyzed by the [employer] Imaging System  ([employer]), an automated imaging and review system  which assists  the laboratory in evaluating cells on ThinPrep PAP tests. Following automated  imaging, selected fields from every slide are reviewed by a cytotechnologist  and/or pathologist.      HPV other High Risk types, PCR 10/14/2020 Negative  Negative Final    HPV High Risk type 16, PCR 10/14/2020 Negative  Negative Final    HPV High Risk type 18, PCR 10/14/2020 Negative  Negative Final    Candida sp 10/14/2020 Negative  Negative Final    Gardnerella vaginalis 10/14/2020 Negative  Negative Final    Trichomonas vaginalis 10/14/2020 Negative  Negative Final   Lab Visit on 09/24/2020   Component Date Value Ref Range Status    Specimen UA 09/24/2020 Urine, Clean Catch   Final    Color, UA 09/24/2020 Yellow  Yellow, Straw, Karol Final    Appearance, UA 09/24/2020 Clear  Clear Final    pH, UA 09/24/2020 6.0  5.0 - 8.0 Final    Specific Gause, UA 09/24/2020 1.020  1.005 - 1.030 Final    Protein, UA 09/24/2020 Negative  Negative Final    Glucose, UA 09/24/2020 Negative  Negative Final    Ketones, UA 09/24/2020 Negative  Negative Final    Bilirubin (UA) 09/24/2020 Negative  Negative Final    Occult Blood UA 09/24/2020 1+* Negative Final    Nitrite, UA 09/24/2020 Negative  Negative Final    Urobilinogen, UA 09/24/2020 Negative  <2.0 EU/dL Final    Leukocytes, UA 09/24/2020 1+* Negative Final    RBC, UA 09/24/2020 2  0 - 4 /hpf Final    WBC, UA 09/24/2020 8* 0 - 5 /hpf Final    Bacteria 09/24/2020 Few* None-Occ /hpf Final    Squam Epithel, UA 09/24/2020 10  /hpf Final    Microscopic Comment 09/24/2020 SEE COMMENT   Final   Admission on 09/14/2020, Discharged on 09/16/2020   Component Date Value Ref Range Status    WBC 09/14/2020 7.98  3.90 - 12.70 K/uL Final    RBC 09/14/2020 4.37  4.00 - 5.40 M/uL Final    Hemoglobin 09/14/2020 12.5  12.0 - 16.0 g/dL Final    Hematocrit 09/14/2020 37.9  37.0 - 48.5 % Final    MCV 09/14/2020 87  82 - 98 fL Final    MCH 09/14/2020 28.6  27.0 - 31.0 pg Final     MCHC 09/14/2020 33.0  32.0 - 36.0 g/dL Final    RDW 09/14/2020 12.4  11.5 - 14.5 % Final    Platelets 09/14/2020 313  150 - 350 K/uL Final    MPV 09/14/2020 9.2  9.2 - 12.9 fL Final    Immature Granulocytes 09/14/2020 0.3  0.0 - 0.5 % Final    Gran # (ANC) 09/14/2020 5.2  1.8 - 7.7 K/uL Final    Immature Grans (Abs) 09/14/2020 0.02  0.00 - 0.04 K/uL Final    Lymph # 09/14/2020 2.0  1.0 - 4.8 K/uL Final    Mono # 09/14/2020 0.7  0.3 - 1.0 K/uL Final    Eos # 09/14/2020 0.0  0.0 - 0.5 K/uL Final    Baso # 09/14/2020 0.02  0.00 - 0.20 K/uL Final    nRBC 09/14/2020 0  0 /100 WBC Final    Gran % 09/14/2020 65.5  38.0 - 73.0 % Final    Lymph % 09/14/2020 25.1  18.0 - 48.0 % Final    Mono % 09/14/2020 8.4  4.0 - 15.0 % Final    Eosinophil % 09/14/2020 0.4  0.0 - 8.0 % Final    Basophil % 09/14/2020 0.3  0.0 - 1.9 % Final    Differential Method 09/14/2020 Automated   Final    Sodium 09/14/2020 139  136 - 145 mmol/L Final    Potassium 09/14/2020 4.0  3.5 - 5.1 mmol/L Final    Chloride 09/14/2020 103  95 - 110 mmol/L Final    CO2 09/14/2020 28  23 - 29 mmol/L Final    Glucose 09/14/2020 122* 70 - 110 mg/dL Final    BUN 09/14/2020 6  6 - 20 mg/dL Final    Creatinine 09/14/2020 0.8  0.5 - 1.4 mg/dL Final    Calcium 09/14/2020 9.5  8.7 - 10.5 mg/dL Final    Total Protein 09/14/2020 8.0  6.0 - 8.4 g/dL Final    Albumin 09/14/2020 4.2  3.5 - 5.2 g/dL Final    Total Bilirubin 09/14/2020 0.4  0.1 - 1.0 mg/dL Final    Alkaline Phosphatase 09/14/2020 97  55 - 135 U/L Final    AST 09/14/2020 31  10 - 40 U/L Final    ALT 09/14/2020 51* 10 - 44 U/L Final    Anion Gap 09/14/2020 8  8 - 16 mmol/L Final    eGFR if African American 09/14/2020 >60  >60 mL/min/1.73 m^2 Final    eGFR if non African American 09/14/2020 >60  >60 mL/min/1.73 m^2 Final    Lactate (Lactic Acid) 09/14/2020 1.0  0.5 - 2.2 mmol/L Final    SARS-CoV-2 RNA, Amplification, Qual 09/14/2020 Negative  Negative Final    Blood Culture,  Routine 09/15/2020 No growth after 5 days.   Final    Blood Culture, Routine 09/15/2020 No growth after 5 days.   Final    WBC 09/15/2020 8.20  3.90 - 12.70 K/uL Final    RBC 09/15/2020 3.91* 4.00 - 5.40 M/uL Final    Hemoglobin 09/15/2020 11.3* 12.0 - 16.0 g/dL Final    Hematocrit 09/15/2020 34.1* 37.0 - 48.5 % Final    MCV 09/15/2020 87  82 - 98 fL Final    MCH 09/15/2020 28.9  27.0 - 31.0 pg Final    MCHC 09/15/2020 33.1  32.0 - 36.0 g/dL Final    RDW 09/15/2020 12.5  11.5 - 14.5 % Final    Platelets 09/15/2020 272  150 - 350 K/uL Final    MPV 09/15/2020 9.0* 9.2 - 12.9 fL Final    Immature Granulocytes 09/15/2020 0.2  0.0 - 0.5 % Final    Gran # (ANC) 09/15/2020 4.1  1.8 - 7.7 K/uL Final    Immature Grans (Abs) 09/15/2020 0.02  0.00 - 0.04 K/uL Final    Lymph # 09/15/2020 3.2  1.0 - 4.8 K/uL Final    Mono # 09/15/2020 0.9  0.3 - 1.0 K/uL Final    Eos # 09/15/2020 0.1  0.0 - 0.5 K/uL Final    Baso # 09/15/2020 0.01  0.00 - 0.20 K/uL Final    nRBC 09/15/2020 0  0 /100 WBC Final    Gran % 09/15/2020 49.4  38.0 - 73.0 % Final    Lymph % 09/15/2020 38.8  18.0 - 48.0 % Final    Mono % 09/15/2020 10.4  4.0 - 15.0 % Final    Eosinophil % 09/15/2020 1.1  0.0 - 8.0 % Final    Basophil % 09/15/2020 0.1  0.0 - 1.9 % Final    Differential Method 09/15/2020 Automated   Final    Sodium 09/15/2020 137  136 - 145 mmol/L Final    Potassium 09/15/2020 3.7  3.5 - 5.1 mmol/L Final    Chloride 09/15/2020 107  95 - 110 mmol/L Final    CO2 09/15/2020 25  23 - 29 mmol/L Final    Glucose 09/15/2020 103  70 - 110 mg/dL Final    BUN 09/15/2020 7  6 - 20 mg/dL Final    Creatinine 09/15/2020 0.6  0.5 - 1.4 mg/dL Final    Calcium 09/15/2020 8.2* 8.7 - 10.5 mg/dL Final    Total Protein 09/15/2020 6.5  6.0 - 8.4 g/dL Final    Albumin 09/15/2020 3.4* 3.5 - 5.2 g/dL Final    Total Bilirubin 09/15/2020 0.3  0.1 - 1.0 mg/dL Final    Alkaline Phosphatase 09/15/2020 80  55 - 135 U/L Final    AST 09/15/2020 28  10  - 40 U/L Final    ALT 09/15/2020 43  10 - 44 U/L Final    Anion Gap 09/15/2020 5* 8 - 16 mmol/L Final    eGFR if African American 09/15/2020 >60  >60 mL/min/1.73 m^2 Final    eGFR if non African American 09/15/2020 >60  >60 mL/min/1.73 m^2 Final    Magnesium 09/15/2020 1.7  1.6 - 2.6 mg/dL Final    Phosphorus 09/15/2020 3.1  2.7 - 4.5 mg/dL Final     Imaging:  No results found for this or any previous visit.  Note: I have independently reviewed any/all imaging/labs/tests and agree with the report (s) as documented.  Any discrepancies will be as noted/demarcated by free text.  QUAN LOO 12/14/2020    ASSESSMENT:  1. Intractable migraine without aura and without status migrainosus    2. Anxiety    3. Cervical myofascial pain syndrome      PLAN:  - Discussed symptoms appear to be consistent with migraine without aura, discussed treatment options and patient agreed with the following plan:  - Start venlafaxine 37.5 mg XR daily x 1 month, if no benefit and no side effects will increase dose to 75 mg XR daily   - Discussed venlafaxine for dual benefit migraine prevention and anxiety  - For acute headaches - maxalt 10 mg mlt  - For nausea - zofran 8 mg odt   - For synergistic effect - ibuprofen 800 mg tabs   - Start tracking headaches via Migraine [facility] tamara on phone   - Risks, benefits, and potential side effects of effexor, maxalt, zofran discussed  - Alternative treatment options offered   - Future considerations - physical therapy or tizanidine for cervical myofascial pain, propranolol   - RTC in 2 months or sooner if needed     Orders Placed This Encounter    venlafaxine (EFFEXOR-XR) 37.5 MG 24 hr capsule    rizatriptan (MAXALT-MLT) 10 MG disintegrating tablet    ondansetron (ZOFRAN-ODT) 8 MG TbDL     I have discussed realistic goals of care with patient at length as well as medication options, and need for lifestyle adjustment. I have explained that treatment will take time. We have agreed that the goal will  be to reduce frequency/intensity/quantity of HA, not to be completely HA free. I have explained my non narcotic policy regarding headache treatment.    Patient to track frequency of headaches.  Patient agreeable to work on lifestyle adjustments.    Questions and concerns were sought and answered to the patient's stated verbal satisfaction.  The patient verbalizes understanding and agreement with the above stated treatment plan.     CC: MD Aleah Whyte, FNP-C  [facility]  [phone]    Dr. Wood was available during today's encounter.

## 2020-12-14 NOTE — PATIENT INSTRUCTIONS
For migraine prevention   - start venlafaxine 37.5 mg daily x 1 month, send message via patient portal after 1 month and I will increase your dose to 75 mg XR daily     When you get a migraine - maxalt/rizatriptan   - take 1 tab rizatriptan (maxalt) at onset of migraine  - may repeat dose in 2 hours if needed with ibuprofen 800 mg tabs   - max 3 tabs/day     For nausea - zofran 8 mg dissolvable   - may take 1 tab every 8 hours as needed for nausea     - Please download Migraine [facility] tamara on phone and begin tracking your headaches

## 2021-01-11 ENCOUNTER — TELEPHONE (OUTPATIENT)
Dept: NEUROLOGY | Facility: CLINIC | Age: 31
End: 2021-01-11

## 2021-01-11 DIAGNOSIS — G43.019 INTRACTABLE MIGRAINE WITHOUT AURA AND WITHOUT STATUS MIGRAINOSUS: ICD-10-CM

## 2021-01-11 DIAGNOSIS — F41.9 ANXIETY: ICD-10-CM

## 2021-01-11 RX ORDER — VENLAFAXINE HYDROCHLORIDE 75 MG/1
75 CAPSULE, EXTENDED RELEASE ORAL DAILY
Qty: 30 CAPSULE | Refills: 5 | Status: SHIPPED | OUTPATIENT
Start: 2021-01-11 | End: 2021-03-01 | Stop reason: SDUPTHER

## 2021-01-13 ENCOUNTER — TELEPHONE (OUTPATIENT)
Dept: NEUROLOGY | Facility: CLINIC | Age: 31
End: 2021-01-13

## 2021-03-01 ENCOUNTER — OFFICE VISIT (OUTPATIENT)
Dept: NEUROLOGY | Facility: CLINIC | Age: 31
End: 2021-03-01
Payer: COMMERCIAL

## 2021-03-01 VITALS
DIASTOLIC BLOOD PRESSURE: 82 MMHG | WEIGHT: 165 LBS | HEIGHT: 61 IN | HEART RATE: 92 BPM | BODY MASS INDEX: 31.15 KG/M2 | SYSTOLIC BLOOD PRESSURE: 118 MMHG

## 2021-03-01 DIAGNOSIS — E66.9 OBESITY (BMI 30.0-34.9): ICD-10-CM

## 2021-03-01 DIAGNOSIS — G43.019 INTRACTABLE MIGRAINE WITHOUT AURA AND WITHOUT STATUS MIGRAINOSUS: Primary | ICD-10-CM

## 2021-03-01 DIAGNOSIS — F41.9 ANXIETY: ICD-10-CM

## 2021-03-01 PROCEDURE — 99214 OFFICE O/P EST MOD 30 MIN: CPT | Mod: S$GLB,,, | Performed by: NURSE PRACTITIONER

## 2021-03-01 PROCEDURE — 99999 PR PBB SHADOW E&M-EST. PATIENT-LVL III: ICD-10-PCS | Mod: PBBFAC,,, | Performed by: NURSE PRACTITIONER

## 2021-03-01 PROCEDURE — 99999 PR PBB SHADOW E&M-EST. PATIENT-LVL III: CPT | Mod: PBBFAC,,, | Performed by: NURSE PRACTITIONER

## 2021-03-01 PROCEDURE — 1125F AMNT PAIN NOTED PAIN PRSNT: CPT | Mod: S$GLB,,, | Performed by: NURSE PRACTITIONER

## 2021-03-01 PROCEDURE — 3008F BODY MASS INDEX DOCD: CPT | Mod: CPTII,S$GLB,, | Performed by: NURSE PRACTITIONER

## 2021-03-01 PROCEDURE — 3008F PR BODY MASS INDEX (BMI) DOCUMENTED: ICD-10-PCS | Mod: CPTII,S$GLB,, | Performed by: NURSE PRACTITIONER

## 2021-03-01 PROCEDURE — 99214 PR OFFICE/OUTPT VISIT, EST, LEVL IV, 30-39 MIN: ICD-10-PCS | Mod: S$GLB,,, | Performed by: NURSE PRACTITIONER

## 2021-03-01 PROCEDURE — 1125F PR PAIN SEVERITY QUANTIFIED, PAIN PRESENT: ICD-10-PCS | Mod: S$GLB,,, | Performed by: NURSE PRACTITIONER

## 2021-03-01 RX ORDER — RIZATRIPTAN BENZOATE 10 MG/1
TABLET, ORALLY DISINTEGRATING ORAL
Qty: 12 TABLET | Refills: 5 | Status: SHIPPED | OUTPATIENT
Start: 2021-03-01

## 2021-03-01 RX ORDER — VENLAFAXINE HYDROCHLORIDE 75 MG/1
75 CAPSULE, EXTENDED RELEASE ORAL DAILY
Qty: 30 CAPSULE | Refills: 5 | Status: SHIPPED | OUTPATIENT
Start: 2021-03-01

## 2021-03-01 RX ORDER — TOPIRAMATE 50 MG/1
50 TABLET, FILM COATED ORAL NIGHTLY
Qty: 30 TABLET | Refills: 5 | Status: SHIPPED | OUTPATIENT
Start: 2021-03-01 | End: 2023-01-09 | Stop reason: SDUPTHER

## 2021-03-30 ENCOUNTER — IMMUNIZATION (OUTPATIENT)
Dept: PRIMARY CARE CLINIC | Facility: CLINIC | Age: 31
End: 2021-03-30
Payer: COMMERCIAL

## 2021-03-30 DIAGNOSIS — Z23 NEED FOR VACCINATION: Primary | ICD-10-CM

## 2021-03-30 PROCEDURE — 0011A PR IMMUNIZ ADMIN, SARS-COV-2 COVID-19 VACC, 100MCG/0.5ML, 1ST DOSE: CPT | Mod: CV19,S$GLB,, | Performed by: INTERNAL MEDICINE

## 2021-03-30 PROCEDURE — 91301 PR SARS-COV-2 COVID-19 VACCINE, NO PRSV, 100MCG/0.5ML, IM: ICD-10-PCS | Mod: S$GLB,,, | Performed by: INTERNAL MEDICINE

## 2021-03-30 PROCEDURE — 91301 PR SARS-COV-2 COVID-19 VACCINE, NO PRSV, 100MCG/0.5ML, IM: CPT | Mod: S$GLB,,, | Performed by: INTERNAL MEDICINE

## 2021-03-30 PROCEDURE — 0011A PR IMMUNIZ ADMIN, SARS-COV-2 COVID-19 VACC, 100MCG/0.5ML, 1ST DOSE: ICD-10-PCS | Mod: CV19,S$GLB,, | Performed by: INTERNAL MEDICINE

## 2021-03-30 RX ADMIN — Medication 0.5 ML: at 12:03

## 2021-04-26 DIAGNOSIS — N94.6 DYSMENORRHEA: ICD-10-CM

## 2021-04-26 RX ORDER — IBUPROFEN 800 MG/1
800 TABLET ORAL EVERY 8 HOURS PRN
Qty: 30 TABLET | Refills: 2 | Status: SHIPPED | OUTPATIENT
Start: 2021-04-26 | End: 2021-09-25 | Stop reason: SDUPTHER

## 2021-04-28 ENCOUNTER — IMMUNIZATION (OUTPATIENT)
Dept: PRIMARY CARE CLINIC | Facility: CLINIC | Age: 31
End: 2021-04-28
Payer: COMMERCIAL

## 2021-04-28 DIAGNOSIS — Z23 NEED FOR VACCINATION: Primary | ICD-10-CM

## 2021-04-28 PROCEDURE — 0012A PR IMMUNIZ ADMIN, SARS-COV-2 COVID-19 VACC, 100MCG/0.5ML, 2ND DOSE: CPT | Mod: CV19,S$GLB,, | Performed by: INTERNAL MEDICINE

## 2021-04-28 PROCEDURE — 0012A PR IMMUNIZ ADMIN, SARS-COV-2 COVID-19 VACC, 100MCG/0.5ML, 2ND DOSE: ICD-10-PCS | Mod: CV19,S$GLB,, | Performed by: INTERNAL MEDICINE

## 2021-04-28 PROCEDURE — 91301 PR SARS-COV-2 COVID-19 VACCINE, NO PRSV, 100MCG/0.5ML, IM: CPT | Mod: S$GLB,,, | Performed by: INTERNAL MEDICINE

## 2021-04-28 PROCEDURE — 91301 PR SARS-COV-2 COVID-19 VACCINE, NO PRSV, 100MCG/0.5ML, IM: ICD-10-PCS | Mod: S$GLB,,, | Performed by: INTERNAL MEDICINE

## 2021-04-28 RX ADMIN — Medication 0.5 ML: at 09:04

## 2021-05-18 ENCOUNTER — OFFICE VISIT (OUTPATIENT)
Dept: FAMILY MEDICINE | Facility: CLINIC | Age: 31
End: 2021-05-18
Payer: COMMERCIAL

## 2021-05-18 VITALS
HEIGHT: 61 IN | WEIGHT: 179 LBS | OXYGEN SATURATION: 100 % | HEART RATE: 101 BPM | BODY MASS INDEX: 33.79 KG/M2 | DIASTOLIC BLOOD PRESSURE: 70 MMHG | SYSTOLIC BLOOD PRESSURE: 110 MMHG

## 2021-05-18 DIAGNOSIS — R42 DIZZINESS: ICD-10-CM

## 2021-05-18 DIAGNOSIS — Z11.59 ENCOUNTER FOR HCV SCREENING TEST FOR LOW RISK PATIENT: ICD-10-CM

## 2021-05-18 DIAGNOSIS — G43.019 INTRACTABLE MIGRAINE WITHOUT AURA AND WITHOUT STATUS MIGRAINOSUS: Primary | ICD-10-CM

## 2021-05-18 DIAGNOSIS — L83 ACANTHOSIS NIGRICANS: ICD-10-CM

## 2021-05-18 DIAGNOSIS — B35.6 TINEA CRURIS: ICD-10-CM

## 2021-05-18 PROCEDURE — 99214 PR OFFICE/OUTPT VISIT, EST, LEVL IV, 30-39 MIN: ICD-10-PCS | Mod: S$GLB,,, | Performed by: FAMILY MEDICINE

## 2021-05-18 PROCEDURE — 99999 PR PBB SHADOW E&M-EST. PATIENT-LVL IV: CPT | Mod: PBBFAC,,, | Performed by: FAMILY MEDICINE

## 2021-05-18 PROCEDURE — 1125F AMNT PAIN NOTED PAIN PRSNT: CPT | Mod: S$GLB,,, | Performed by: FAMILY MEDICINE

## 2021-05-18 PROCEDURE — 1125F PR PAIN SEVERITY QUANTIFIED, PAIN PRESENT: ICD-10-PCS | Mod: S$GLB,,, | Performed by: FAMILY MEDICINE

## 2021-05-18 PROCEDURE — 3008F PR BODY MASS INDEX (BMI) DOCUMENTED: ICD-10-PCS | Mod: CPTII,S$GLB,, | Performed by: FAMILY MEDICINE

## 2021-05-18 PROCEDURE — 99999 PR PBB SHADOW E&M-EST. PATIENT-LVL IV: ICD-10-PCS | Mod: PBBFAC,,, | Performed by: FAMILY MEDICINE

## 2021-05-18 PROCEDURE — 99214 OFFICE O/P EST MOD 30 MIN: CPT | Mod: S$GLB,,, | Performed by: FAMILY MEDICINE

## 2021-05-18 PROCEDURE — 3008F BODY MASS INDEX DOCD: CPT | Mod: CPTII,S$GLB,, | Performed by: FAMILY MEDICINE

## 2021-05-18 RX ORDER — KETOCONAZOLE 20 MG/G
CREAM TOPICAL DAILY
Qty: 60 G | Refills: 0 | Status: SHIPPED | OUTPATIENT
Start: 2021-05-18 | End: 2022-06-24 | Stop reason: SDUPTHER

## 2021-05-22 ENCOUNTER — LAB VISIT (OUTPATIENT)
Dept: LAB | Facility: HOSPITAL | Age: 31
End: 2021-05-22
Attending: FAMILY MEDICINE
Payer: COMMERCIAL

## 2021-05-22 DIAGNOSIS — Z11.59 ENCOUNTER FOR HCV SCREENING TEST FOR LOW RISK PATIENT: ICD-10-CM

## 2021-05-22 DIAGNOSIS — L83 ACANTHOSIS NIGRICANS: ICD-10-CM

## 2021-05-22 DIAGNOSIS — R42 DIZZINESS: ICD-10-CM

## 2021-05-22 LAB
ALBUMIN SERPL BCP-MCNC: 4 G/DL (ref 3.5–5.2)
ALP SERPL-CCNC: 66 U/L (ref 55–135)
ALT SERPL W/O P-5'-P-CCNC: 51 U/L (ref 10–44)
ANION GAP SERPL CALC-SCNC: 7 MMOL/L (ref 8–16)
AST SERPL-CCNC: 28 U/L (ref 10–40)
BASOPHILS # BLD AUTO: 0.02 K/UL (ref 0–0.2)
BASOPHILS NFR BLD: 0.3 % (ref 0–1.9)
BILIRUB SERPL-MCNC: 0.7 MG/DL (ref 0.1–1)
BUN SERPL-MCNC: 8 MG/DL (ref 6–20)
CALCIUM SERPL-MCNC: 9.2 MG/DL (ref 8.7–10.5)
CHLORIDE SERPL-SCNC: 107 MMOL/L (ref 95–110)
CHOLEST SERPL-MCNC: 200 MG/DL (ref 120–199)
CHOLEST/HDLC SERPL: 4.8 {RATIO} (ref 2–5)
CO2 SERPL-SCNC: 26 MMOL/L (ref 23–29)
CREAT SERPL-MCNC: 0.6 MG/DL (ref 0.5–1.4)
DIFFERENTIAL METHOD: ABNORMAL
EOSINOPHIL # BLD AUTO: 0.1 K/UL (ref 0–0.5)
EOSINOPHIL NFR BLD: 1.1 % (ref 0–8)
ERYTHROCYTE [DISTWIDTH] IN BLOOD BY AUTOMATED COUNT: 13 % (ref 11.5–14.5)
EST. GFR  (AFRICAN AMERICAN): >60 ML/MIN/1.73 M^2
EST. GFR  (NON AFRICAN AMERICAN): >60 ML/MIN/1.73 M^2
ESTIMATED AVG GLUCOSE: 100 MG/DL (ref 68–131)
GLUCOSE SERPL-MCNC: 85 MG/DL (ref 70–110)
HBA1C MFR BLD: 5.1 % (ref 4–5.6)
HCT VFR BLD AUTO: 39.9 % (ref 37–48.5)
HDLC SERPL-MCNC: 42 MG/DL (ref 40–75)
HDLC SERPL: 21 % (ref 20–50)
HGB BLD-MCNC: 12.5 G/DL (ref 12–16)
IMM GRANULOCYTES # BLD AUTO: 0.02 K/UL (ref 0–0.04)
IMM GRANULOCYTES NFR BLD AUTO: 0.3 % (ref 0–0.5)
LDLC SERPL CALC-MCNC: 128.6 MG/DL (ref 63–159)
LYMPHOCYTES # BLD AUTO: 2.5 K/UL (ref 1–4.8)
LYMPHOCYTES NFR BLD: 35.2 % (ref 18–48)
MCH RBC QN AUTO: 28.7 PG (ref 27–31)
MCHC RBC AUTO-ENTMCNC: 31.3 G/DL (ref 32–36)
MCV RBC AUTO: 92 FL (ref 82–98)
MONOCYTES # BLD AUTO: 0.4 K/UL (ref 0.3–1)
MONOCYTES NFR BLD: 6.2 % (ref 4–15)
NEUTROPHILS # BLD AUTO: 4 K/UL (ref 1.8–7.7)
NEUTROPHILS NFR BLD: 56.9 % (ref 38–73)
NONHDLC SERPL-MCNC: 158 MG/DL
NRBC BLD-RTO: 0 /100 WBC
PLATELET # BLD AUTO: 343 K/UL (ref 150–450)
PMV BLD AUTO: 9.4 FL (ref 9.2–12.9)
POTASSIUM SERPL-SCNC: 4.1 MMOL/L (ref 3.5–5.1)
PROT SERPL-MCNC: 7.1 G/DL (ref 6–8.4)
RBC # BLD AUTO: 4.35 M/UL (ref 4–5.4)
SODIUM SERPL-SCNC: 140 MMOL/L (ref 136–145)
TRIGL SERPL-MCNC: 147 MG/DL (ref 30–150)
TSH SERPL DL<=0.005 MIU/L-ACNC: 0.99 UIU/ML (ref 0.4–4)
WBC # BLD AUTO: 6.96 K/UL (ref 3.9–12.7)

## 2021-05-22 PROCEDURE — 83036 HEMOGLOBIN GLYCOSYLATED A1C: CPT | Performed by: FAMILY MEDICINE

## 2021-05-22 PROCEDURE — 80061 LIPID PANEL: CPT | Performed by: FAMILY MEDICINE

## 2021-05-22 PROCEDURE — 86803 HEPATITIS C AB TEST: CPT | Performed by: FAMILY MEDICINE

## 2021-05-22 PROCEDURE — 85025 COMPLETE CBC W/AUTO DIFF WBC: CPT | Performed by: FAMILY MEDICINE

## 2021-05-22 PROCEDURE — 84443 ASSAY THYROID STIM HORMONE: CPT | Performed by: FAMILY MEDICINE

## 2021-05-22 PROCEDURE — 36415 COLL VENOUS BLD VENIPUNCTURE: CPT | Mod: PO | Performed by: FAMILY MEDICINE

## 2021-05-22 PROCEDURE — 80053 COMPREHEN METABOLIC PANEL: CPT | Performed by: FAMILY MEDICINE

## 2021-05-23 ENCOUNTER — PATIENT MESSAGE (OUTPATIENT)
Dept: FAMILY MEDICINE | Facility: CLINIC | Age: 31
End: 2021-05-23

## 2021-05-24 LAB — HCV AB SERPL QL IA: NEGATIVE

## 2021-09-03 ENCOUNTER — PATIENT MESSAGE (OUTPATIENT)
Dept: NEUROLOGY | Facility: CLINIC | Age: 31
End: 2021-09-03

## 2021-11-18 ENCOUNTER — OFFICE VISIT (OUTPATIENT)
Dept: FAMILY MEDICINE | Facility: CLINIC | Age: 31
End: 2021-11-18
Payer: COMMERCIAL

## 2021-11-18 ENCOUNTER — PATIENT MESSAGE (OUTPATIENT)
Dept: FAMILY MEDICINE | Facility: CLINIC | Age: 31
End: 2021-11-18

## 2021-11-18 VITALS
DIASTOLIC BLOOD PRESSURE: 72 MMHG | SYSTOLIC BLOOD PRESSURE: 108 MMHG | HEIGHT: 61 IN | HEART RATE: 95 BPM | WEIGHT: 177.25 LBS | OXYGEN SATURATION: 99 % | BODY MASS INDEX: 33.47 KG/M2

## 2021-11-18 DIAGNOSIS — R43.8 DECREASED SENSE OF SMELL: ICD-10-CM

## 2021-11-18 DIAGNOSIS — F33.1 MODERATE EPISODE OF RECURRENT MAJOR DEPRESSIVE DISORDER: Primary | ICD-10-CM

## 2021-11-18 DIAGNOSIS — M15.9 PRIMARY OSTEOARTHRITIS INVOLVING MULTIPLE JOINTS: ICD-10-CM

## 2021-11-18 DIAGNOSIS — Z23 NEEDS FLU SHOT: ICD-10-CM

## 2021-11-18 PROCEDURE — 99214 PR OFFICE/OUTPT VISIT, EST, LEVL IV, 30-39 MIN: ICD-10-PCS | Mod: 25,S$GLB,, | Performed by: FAMILY MEDICINE

## 2021-11-18 PROCEDURE — 90471 FLU VACCINE (QUAD) GREATER THAN OR EQUAL TO 3YO PRESERVATIVE FREE IM: ICD-10-PCS | Mod: S$GLB,,, | Performed by: FAMILY MEDICINE

## 2021-11-18 PROCEDURE — 3008F BODY MASS INDEX DOCD: CPT | Mod: CPTII,S$GLB,, | Performed by: FAMILY MEDICINE

## 2021-11-18 PROCEDURE — 90686 FLU VACCINE (QUAD) GREATER THAN OR EQUAL TO 3YO PRESERVATIVE FREE IM: ICD-10-PCS | Mod: S$GLB,,, | Performed by: FAMILY MEDICINE

## 2021-11-18 PROCEDURE — 99999 PR PBB SHADOW E&M-EST. PATIENT-LVL IV: ICD-10-PCS | Mod: PBBFAC,,, | Performed by: FAMILY MEDICINE

## 2021-11-18 PROCEDURE — 3074F PR MOST RECENT SYSTOLIC BLOOD PRESSURE < 130 MM HG: ICD-10-PCS | Mod: CPTII,S$GLB,, | Performed by: FAMILY MEDICINE

## 2021-11-18 PROCEDURE — 1160F PR REVIEW ALL MEDS BY PRESCRIBER/CLIN PHARMACIST DOCUMENTED: ICD-10-PCS | Mod: CPTII,S$GLB,, | Performed by: FAMILY MEDICINE

## 2021-11-18 PROCEDURE — 3008F PR BODY MASS INDEX (BMI) DOCUMENTED: ICD-10-PCS | Mod: CPTII,S$GLB,, | Performed by: FAMILY MEDICINE

## 2021-11-18 PROCEDURE — 3078F DIAST BP <80 MM HG: CPT | Mod: CPTII,S$GLB,, | Performed by: FAMILY MEDICINE

## 2021-11-18 PROCEDURE — 1159F MED LIST DOCD IN RCRD: CPT | Mod: CPTII,S$GLB,, | Performed by: FAMILY MEDICINE

## 2021-11-18 PROCEDURE — 99214 OFFICE O/P EST MOD 30 MIN: CPT | Mod: 25,S$GLB,, | Performed by: FAMILY MEDICINE

## 2021-11-18 PROCEDURE — 99999 PR PBB SHADOW E&M-EST. PATIENT-LVL IV: CPT | Mod: PBBFAC,,, | Performed by: FAMILY MEDICINE

## 2021-11-18 PROCEDURE — 90471 IMMUNIZATION ADMIN: CPT | Mod: S$GLB,,, | Performed by: FAMILY MEDICINE

## 2021-11-18 PROCEDURE — 1160F RVW MEDS BY RX/DR IN RCRD: CPT | Mod: CPTII,S$GLB,, | Performed by: FAMILY MEDICINE

## 2021-11-18 PROCEDURE — 3044F HG A1C LEVEL LT 7.0%: CPT | Mod: CPTII,S$GLB,, | Performed by: FAMILY MEDICINE

## 2021-11-18 PROCEDURE — 90686 IIV4 VACC NO PRSV 0.5 ML IM: CPT | Mod: S$GLB,,, | Performed by: FAMILY MEDICINE

## 2021-11-18 PROCEDURE — 3074F SYST BP LT 130 MM HG: CPT | Mod: CPTII,S$GLB,, | Performed by: FAMILY MEDICINE

## 2021-11-18 PROCEDURE — 3044F PR MOST RECENT HEMOGLOBIN A1C LEVEL <7.0%: ICD-10-PCS | Mod: CPTII,S$GLB,, | Performed by: FAMILY MEDICINE

## 2021-11-18 PROCEDURE — 3078F PR MOST RECENT DIASTOLIC BLOOD PRESSURE < 80 MM HG: ICD-10-PCS | Mod: CPTII,S$GLB,, | Performed by: FAMILY MEDICINE

## 2021-11-18 PROCEDURE — 1159F PR MEDICATION LIST DOCUMENTED IN MEDICAL RECORD: ICD-10-PCS | Mod: CPTII,S$GLB,, | Performed by: FAMILY MEDICINE

## 2021-11-18 RX ORDER — SEMAGLUTIDE 0.5 MG/.5ML
0.5 INJECTION, SOLUTION SUBCUTANEOUS
Qty: 2 ML | Refills: 1 | Status: SHIPPED | OUTPATIENT
Start: 2021-11-18 | End: 2021-11-21

## 2021-11-18 RX ORDER — IBUPROFEN 600 MG/1
600 TABLET ORAL EVERY 6 HOURS PRN
Qty: 40 TABLET | Refills: 0 | Status: SHIPPED | OUTPATIENT
Start: 2021-11-18 | End: 2021-11-28

## 2021-11-18 RX ORDER — MOMETASONE FUROATE 50 UG/1
2 SPRAY, METERED NASAL DAILY
Qty: 17 G | Refills: 0 | Status: SHIPPED | OUTPATIENT
Start: 2021-11-18 | End: 2021-11-21

## 2021-11-19 ENCOUNTER — TELEPHONE (OUTPATIENT)
Dept: PHARMACY | Facility: CLINIC | Age: 31
End: 2021-11-19
Payer: COMMERCIAL

## 2021-11-19 ENCOUNTER — TELEPHONE (OUTPATIENT)
Dept: ADMINISTRATIVE | Facility: OTHER | Age: 31
End: 2021-11-19
Payer: COMMERCIAL

## 2021-11-21 ENCOUNTER — TELEPHONE (OUTPATIENT)
Dept: FAMILY MEDICINE | Facility: CLINIC | Age: 31
End: 2021-11-21
Payer: COMMERCIAL

## 2021-11-21 DIAGNOSIS — J30.1 SEASONAL ALLERGIC RHINITIS DUE TO POLLEN: ICD-10-CM

## 2021-11-21 RX ORDER — FLUTICASONE PROPIONATE 50 MCG
2 SPRAY, SUSPENSION (ML) NASAL DAILY
Qty: 16 G | Refills: 3 | Status: SHIPPED | OUTPATIENT
Start: 2021-11-21 | End: 2021-12-21

## 2022-02-03 ENCOUNTER — PATIENT MESSAGE (OUTPATIENT)
Dept: FAMILY MEDICINE | Facility: CLINIC | Age: 32
End: 2022-02-03
Payer: COMMERCIAL

## 2022-03-29 ENCOUNTER — LAB VISIT (OUTPATIENT)
Dept: LAB | Facility: HOSPITAL | Age: 32
End: 2022-03-29
Attending: NURSE PRACTITIONER
Payer: COMMERCIAL

## 2022-03-29 ENCOUNTER — OFFICE VISIT (OUTPATIENT)
Dept: FAMILY MEDICINE | Facility: CLINIC | Age: 32
End: 2022-03-29
Payer: COMMERCIAL

## 2022-03-29 VITALS
HEART RATE: 79 BPM | HEIGHT: 61 IN | SYSTOLIC BLOOD PRESSURE: 100 MMHG | WEIGHT: 179 LBS | BODY MASS INDEX: 33.79 KG/M2 | DIASTOLIC BLOOD PRESSURE: 72 MMHG

## 2022-03-29 DIAGNOSIS — R42 VERTIGO: ICD-10-CM

## 2022-03-29 DIAGNOSIS — R53.83 FATIGUE, UNSPECIFIED TYPE: Primary | ICD-10-CM

## 2022-03-29 DIAGNOSIS — R53.83 FATIGUE, UNSPECIFIED TYPE: ICD-10-CM

## 2022-03-29 DIAGNOSIS — Z01.419 WELL WOMAN EXAM: ICD-10-CM

## 2022-03-29 DIAGNOSIS — L91.8 SKIN TAG: ICD-10-CM

## 2022-03-29 LAB
ALBUMIN SERPL BCP-MCNC: 4.1 G/DL (ref 3.5–5.2)
ALP SERPL-CCNC: 61 U/L (ref 55–135)
ALT SERPL W/O P-5'-P-CCNC: 33 U/L (ref 10–44)
ANION GAP SERPL CALC-SCNC: 10 MMOL/L (ref 8–16)
AST SERPL-CCNC: 21 U/L (ref 10–40)
BASOPHILS # BLD AUTO: 0.02 K/UL (ref 0–0.2)
BASOPHILS NFR BLD: 0.3 % (ref 0–1.9)
BILIRUB SERPL-MCNC: 0.4 MG/DL (ref 0.1–1)
BUN SERPL-MCNC: 10 MG/DL (ref 6–20)
CALCIUM SERPL-MCNC: 9.4 MG/DL (ref 8.7–10.5)
CHLORIDE SERPL-SCNC: 105 MMOL/L (ref 95–110)
CO2 SERPL-SCNC: 24 MMOL/L (ref 23–29)
CREAT SERPL-MCNC: 0.6 MG/DL (ref 0.5–1.4)
DIFFERENTIAL METHOD: ABNORMAL
EOSINOPHIL # BLD AUTO: 0.2 K/UL (ref 0–0.5)
EOSINOPHIL NFR BLD: 2.8 % (ref 0–8)
ERYTHROCYTE [DISTWIDTH] IN BLOOD BY AUTOMATED COUNT: 13.1 % (ref 11.5–14.5)
EST. GFR  (AFRICAN AMERICAN): >60 ML/MIN/1.73 M^2
EST. GFR  (NON AFRICAN AMERICAN): >60 ML/MIN/1.73 M^2
GLUCOSE SERPL-MCNC: 83 MG/DL (ref 70–110)
HCT VFR BLD AUTO: 40.3 % (ref 37–48.5)
HGB BLD-MCNC: 12.7 G/DL (ref 12–16)
IMM GRANULOCYTES # BLD AUTO: 0.02 K/UL (ref 0–0.04)
IMM GRANULOCYTES NFR BLD AUTO: 0.3 % (ref 0–0.5)
LYMPHOCYTES # BLD AUTO: 2.8 K/UL (ref 1–4.8)
LYMPHOCYTES NFR BLD: 39.8 % (ref 18–48)
MCH RBC QN AUTO: 28.7 PG (ref 27–31)
MCHC RBC AUTO-ENTMCNC: 31.5 G/DL (ref 32–36)
MCV RBC AUTO: 91 FL (ref 82–98)
MONOCYTES # BLD AUTO: 0.4 K/UL (ref 0.3–1)
MONOCYTES NFR BLD: 5.4 % (ref 4–15)
NEUTROPHILS # BLD AUTO: 3.6 K/UL (ref 1.8–7.7)
NEUTROPHILS NFR BLD: 51.4 % (ref 38–73)
NRBC BLD-RTO: 0 /100 WBC
PLATELET # BLD AUTO: 293 K/UL (ref 150–450)
PMV BLD AUTO: 9.9 FL (ref 9.2–12.9)
POTASSIUM SERPL-SCNC: 4.1 MMOL/L (ref 3.5–5.1)
PROT SERPL-MCNC: 7.5 G/DL (ref 6–8.4)
RBC # BLD AUTO: 4.42 M/UL (ref 4–5.4)
SODIUM SERPL-SCNC: 139 MMOL/L (ref 136–145)
TSH SERPL DL<=0.005 MIU/L-ACNC: 1.71 UIU/ML (ref 0.4–4)
VIT B12 SERPL-MCNC: 341 PG/ML (ref 210–950)
WBC # BLD AUTO: 7.06 K/UL (ref 3.9–12.7)

## 2022-03-29 PROCEDURE — 3078F DIAST BP <80 MM HG: CPT | Mod: CPTII,S$GLB,, | Performed by: NURSE PRACTITIONER

## 2022-03-29 PROCEDURE — 3008F BODY MASS INDEX DOCD: CPT | Mod: CPTII,S$GLB,, | Performed by: NURSE PRACTITIONER

## 2022-03-29 PROCEDURE — 80053 COMPREHEN METABOLIC PANEL: CPT | Performed by: NURSE PRACTITIONER

## 2022-03-29 PROCEDURE — 99214 OFFICE O/P EST MOD 30 MIN: CPT | Mod: S$GLB,,, | Performed by: NURSE PRACTITIONER

## 2022-03-29 PROCEDURE — 1160F RVW MEDS BY RX/DR IN RCRD: CPT | Mod: CPTII,S$GLB,, | Performed by: NURSE PRACTITIONER

## 2022-03-29 PROCEDURE — 82607 VITAMIN B-12: CPT | Performed by: NURSE PRACTITIONER

## 2022-03-29 PROCEDURE — 99999 PR PBB SHADOW E&M-EST. PATIENT-LVL V: CPT | Mod: PBBFAC,,, | Performed by: NURSE PRACTITIONER

## 2022-03-29 PROCEDURE — 1159F PR MEDICATION LIST DOCUMENTED IN MEDICAL RECORD: ICD-10-PCS | Mod: CPTII,S$GLB,, | Performed by: NURSE PRACTITIONER

## 2022-03-29 PROCEDURE — 1160F PR REVIEW ALL MEDS BY PRESCRIBER/CLIN PHARMACIST DOCUMENTED: ICD-10-PCS | Mod: CPTII,S$GLB,, | Performed by: NURSE PRACTITIONER

## 2022-03-29 PROCEDURE — 99214 PR OFFICE/OUTPT VISIT, EST, LEVL IV, 30-39 MIN: ICD-10-PCS | Mod: S$GLB,,, | Performed by: NURSE PRACTITIONER

## 2022-03-29 PROCEDURE — 84443 ASSAY THYROID STIM HORMONE: CPT | Performed by: NURSE PRACTITIONER

## 2022-03-29 PROCEDURE — 3074F PR MOST RECENT SYSTOLIC BLOOD PRESSURE < 130 MM HG: ICD-10-PCS | Mod: CPTII,S$GLB,, | Performed by: NURSE PRACTITIONER

## 2022-03-29 PROCEDURE — 1159F MED LIST DOCD IN RCRD: CPT | Mod: CPTII,S$GLB,, | Performed by: NURSE PRACTITIONER

## 2022-03-29 PROCEDURE — 3078F PR MOST RECENT DIASTOLIC BLOOD PRESSURE < 80 MM HG: ICD-10-PCS | Mod: CPTII,S$GLB,, | Performed by: NURSE PRACTITIONER

## 2022-03-29 PROCEDURE — 99999 PR PBB SHADOW E&M-EST. PATIENT-LVL V: ICD-10-PCS | Mod: PBBFAC,,, | Performed by: NURSE PRACTITIONER

## 2022-03-29 PROCEDURE — 3074F SYST BP LT 130 MM HG: CPT | Mod: CPTII,S$GLB,, | Performed by: NURSE PRACTITIONER

## 2022-03-29 PROCEDURE — 3008F PR BODY MASS INDEX (BMI) DOCUMENTED: ICD-10-PCS | Mod: CPTII,S$GLB,, | Performed by: NURSE PRACTITIONER

## 2022-03-29 PROCEDURE — 85025 COMPLETE CBC W/AUTO DIFF WBC: CPT | Performed by: NURSE PRACTITIONER

## 2022-03-29 PROCEDURE — 36415 COLL VENOUS BLD VENIPUNCTURE: CPT | Mod: PO | Performed by: NURSE PRACTITIONER

## 2022-03-29 RX ORDER — MECLIZINE HYDROCHLORIDE 25 MG/1
25 TABLET ORAL 3 TIMES DAILY PRN
Qty: 30 TABLET | Refills: 0 | Status: SHIPPED | OUTPATIENT
Start: 2022-03-29 | End: 2024-02-23

## 2022-03-29 NOTE — PROGRESS NOTES
Subjective:       Patient ID: Jessi Samson is a 31 y.o. female.    Chief Complaint: Fatigue and Shoulder Pain    This is a pleasant 30 yo  female patient of Dr. Ricardo who is known to me. She presents today with c/o fatigue and dizziness. PMH includes    Patient Active Problem List:     Hearing decreased, bilateral     E coli bacteremia     Tachycardia     Flank pain     Complicated UTI (urinary tract infection)     Herpes infection     Encounter for IUD removal     Intractable migraine without aura and without status migrainosus     Anxiety     Moderate episode of recurrent major depressive disorder    VSS today. Patient reports she has been experiencing symptoms of dizziness x 1 week that mostly occurs with positional changes. Dizziness lasts a few seconds and quickly resolves. Denies ear pain/fullness, URI symptoms but occasionally has ear itchiness. Wears hearing aids and is not sure if the hearing aids cause the itching.     Says her children were sick with flu 3 weeks ago; she also had flu-like symptoms and tested negative for flu. Continues to feel fatigue with upper body aches. Took a home COVID test last week that was negative. Had Mirena placed November 2020, still intact. Has not had a period in a while. Took a home pregnancy test that was also negative. Patient is also trying to lose weight. Has reduced starchy foods/sweets and eating more vegetables/lean protein. Stays physically active with work, occasionally having to push heavy items which she believes is contributing to shoulder pain.     Review of Systems   Constitutional: Positive for fatigue. Negative for appetite change, chills and fever.   HENT: Negative for sore throat and trouble swallowing.    Eyes: Negative for visual disturbance.   Respiratory: Negative for cough, chest tightness and shortness of breath.    Cardiovascular: Negative for chest pain and leg swelling.   Gastrointestinal: Negative for abdominal pain, nausea and  vomiting.   Genitourinary: Negative for difficulty urinating and menstrual problem.   Musculoskeletal: Positive for arthralgias (shoulders). Negative for gait problem.   Integumentary:  Positive for mole/lesion (skin tag to left upper thigh).   Neurological: Positive for dizziness and vertigo. Negative for light-headedness, disturbances in coordination and coordination difficulties.         Objective:      Physical Exam  Vitals reviewed.   Constitutional:       General: She is not in acute distress.     Appearance: Normal appearance. She is well-developed and well-groomed. She is obese.   HENT:      Head: Normocephalic and atraumatic.      Right Ear: Tympanic membrane, ear canal and external ear normal.      Left Ear: Tympanic membrane, ear canal and external ear normal.      Ears:      Comments: Hearing aid in place     Mouth/Throat:      Mouth: Mucous membranes are moist.      Pharynx: Oropharynx is clear. No posterior oropharyngeal erythema.   Eyes:      General:         Right eye: No discharge.         Left eye: No discharge.      Extraocular Movements: Extraocular movements intact.      Pupils: Pupils are equal, round, and reactive to light.      Comments: Wearing glasses   Neck:      Vascular: No carotid bruit.   Cardiovascular:      Rate and Rhythm: Normal rate and regular rhythm.      Heart sounds: No murmur heard.  Pulmonary:      Effort: Pulmonary effort is normal. No respiratory distress.      Breath sounds: No wheezing or rhonchi.   Abdominal:      General: Bowel sounds are normal. There is no distension.      Palpations: Abdomen is soft.      Tenderness: There is no abdominal tenderness. There is no guarding or rebound.   Musculoskeletal:      Right lower leg: No edema.      Left lower leg: No edema.   Lymphadenopathy:      Cervical: No cervical adenopathy.   Skin:     General: Skin is warm and dry.      Coloration: Skin is not pale.   Neurological:      Mental Status: She is alert and oriented to  person, place, and time.      Coordination: Coordination normal.      Gait: Gait normal.   Psychiatric:         Attention and Perception: Attention normal.         Mood and Affect: Mood and affect normal.         Speech: Speech normal.         Behavior: Behavior normal. Behavior is cooperative.         Thought Content: Thought content normal.         Assessment:       Problem List Items Addressed This Visit    None     Visit Diagnoses     Fatigue, unspecified type    -  Primary    Relevant Orders    CBC Auto Differential (Completed)    Comprehensive Metabolic Panel    TSH    Vitamin B12    Well woman exam        Relevant Orders    Ambulatory referral/consult to Obstetrics / Gynecology    Skin tag        Relevant Orders    Ambulatory referral/consult to Dermatology    Vertigo        Relevant Medications    meclizine (ANTIVERT) 25 mg tablet          Plan:       Jessi was seen today for fatigue and shoulder pain.    Diagnoses and all orders for this visit:    Fatigue, unspecified type  -     CBC Auto Differential; Future  -     Comprehensive Metabolic Panel; Future  -     TSH; Future  -     Vitamin B12; Future    Well woman exam  -     Ambulatory referral/consult to Obstetrics / Gynecology; Future    Skin tag  -     Ambulatory referral/consult to Dermatology; Future    Vertigo  -     meclizine (ANTIVERT) 25 mg tablet; Take 1 tablet (25 mg total) by mouth 3 (three) times daily as needed for Dizziness.        - Labs ordered today  - May use meclizine up to TID PRN dizziness (do not take with Zofran)  - Drink plenty fluids and encouraged to follow low-fat, heart-healthy diet  - Avoid heavy lifting and strenuous activity, recommend wearing lumbar support when working  - Follow up with Dermatology and OB/GYN  - RTC in 3 months for follow-up with PCP, or sooner if needed          I spent a total of 30 minutes on the day of the visit.  This includes face to face time and non-face to face time preparing to see the patient  (eg, review of tests), obtaining and/or reviewing separately obtained history, documenting clinical information in the electronic or other health record, independently interpreting results and communicating results to the patient/family/caregiver, or care coordinator.

## 2022-04-02 ENCOUNTER — PATIENT MESSAGE (OUTPATIENT)
Dept: FAMILY MEDICINE | Facility: CLINIC | Age: 32
End: 2022-04-02
Payer: COMMERCIAL

## 2022-04-09 ENCOUNTER — TELEPHONE (OUTPATIENT)
Dept: FAMILY MEDICINE | Facility: CLINIC | Age: 32
End: 2022-04-09
Payer: COMMERCIAL

## 2022-04-09 RX ORDER — ORLISTAT 120 MG/1
120 CAPSULE ORAL
Qty: 90 CAPSULE | Refills: 11 | Status: SHIPPED | OUTPATIENT
Start: 2022-04-09 | End: 2022-05-04 | Stop reason: SDUPTHER

## 2022-04-11 ENCOUNTER — PATIENT MESSAGE (OUTPATIENT)
Dept: FAMILY MEDICINE | Facility: CLINIC | Age: 32
End: 2022-04-11
Payer: COMMERCIAL

## 2022-06-24 ENCOUNTER — OFFICE VISIT (OUTPATIENT)
Dept: OBSTETRICS AND GYNECOLOGY | Facility: CLINIC | Age: 32
End: 2022-06-24
Payer: COMMERCIAL

## 2022-06-24 VITALS
BODY MASS INDEX: 33.37 KG/M2 | DIASTOLIC BLOOD PRESSURE: 79 MMHG | SYSTOLIC BLOOD PRESSURE: 112 MMHG | WEIGHT: 176.56 LBS

## 2022-06-24 DIAGNOSIS — Z01.419 ROUTINE GYNECOLOGICAL EXAMINATION: Primary | ICD-10-CM

## 2022-06-24 DIAGNOSIS — B37.2 SKIN YEAST INFECTION: ICD-10-CM

## 2022-06-24 DIAGNOSIS — N94.6 DYSMENORRHEA: ICD-10-CM

## 2022-06-24 DIAGNOSIS — B35.6 TINEA CRURIS: ICD-10-CM

## 2022-06-24 DIAGNOSIS — Z12.4 CERVICAL CANCER SCREENING: ICD-10-CM

## 2022-06-24 DIAGNOSIS — Z01.419 WELL WOMAN EXAM: ICD-10-CM

## 2022-06-24 PROCEDURE — 3078F PR MOST RECENT DIASTOLIC BLOOD PRESSURE < 80 MM HG: ICD-10-PCS | Mod: CPTII,S$GLB,, | Performed by: OBSTETRICS & GYNECOLOGY

## 2022-06-24 PROCEDURE — 88175 CYTOPATH C/V AUTO FLUID REDO: CPT | Performed by: OBSTETRICS & GYNECOLOGY

## 2022-06-24 PROCEDURE — 3008F PR BODY MASS INDEX (BMI) DOCUMENTED: ICD-10-PCS | Mod: CPTII,S$GLB,, | Performed by: OBSTETRICS & GYNECOLOGY

## 2022-06-24 PROCEDURE — 87624 HPV HI-RISK TYP POOLED RSLT: CPT | Performed by: OBSTETRICS & GYNECOLOGY

## 2022-06-24 PROCEDURE — 3078F DIAST BP <80 MM HG: CPT | Mod: CPTII,S$GLB,, | Performed by: OBSTETRICS & GYNECOLOGY

## 2022-06-24 PROCEDURE — 3074F SYST BP LT 130 MM HG: CPT | Mod: CPTII,S$GLB,, | Performed by: OBSTETRICS & GYNECOLOGY

## 2022-06-24 PROCEDURE — 1159F PR MEDICATION LIST DOCUMENTED IN MEDICAL RECORD: ICD-10-PCS | Mod: CPTII,S$GLB,, | Performed by: OBSTETRICS & GYNECOLOGY

## 2022-06-24 PROCEDURE — 1159F MED LIST DOCD IN RCRD: CPT | Mod: CPTII,S$GLB,, | Performed by: OBSTETRICS & GYNECOLOGY

## 2022-06-24 PROCEDURE — 99999 PR PBB SHADOW E&M-EST. PATIENT-LVL III: CPT | Mod: PBBFAC,,, | Performed by: OBSTETRICS & GYNECOLOGY

## 2022-06-24 PROCEDURE — 3074F PR MOST RECENT SYSTOLIC BLOOD PRESSURE < 130 MM HG: ICD-10-PCS | Mod: CPTII,S$GLB,, | Performed by: OBSTETRICS & GYNECOLOGY

## 2022-06-24 PROCEDURE — 99395 PREV VISIT EST AGE 18-39: CPT | Mod: S$GLB,,, | Performed by: OBSTETRICS & GYNECOLOGY

## 2022-06-24 PROCEDURE — 99395 PR PREVENTIVE VISIT,EST,18-39: ICD-10-PCS | Mod: S$GLB,,, | Performed by: OBSTETRICS & GYNECOLOGY

## 2022-06-24 PROCEDURE — 99999 PR PBB SHADOW E&M-EST. PATIENT-LVL III: ICD-10-PCS | Mod: PBBFAC,,, | Performed by: OBSTETRICS & GYNECOLOGY

## 2022-06-24 PROCEDURE — 3008F BODY MASS INDEX DOCD: CPT | Mod: CPTII,S$GLB,, | Performed by: OBSTETRICS & GYNECOLOGY

## 2022-06-24 RX ORDER — NYSTATIN 100000 [USP'U]/G
POWDER TOPICAL
Qty: 60 G | Refills: 5 | Status: SHIPPED | OUTPATIENT
Start: 2022-06-24 | End: 2024-02-23

## 2022-06-24 RX ORDER — KETOCONAZOLE 20 MG/ML
SHAMPOO, SUSPENSION TOPICAL
Qty: 120 ML | Refills: 4 | Status: SHIPPED | OUTPATIENT
Start: 2022-06-27

## 2022-06-24 RX ORDER — KETOCONAZOLE 20 MG/G
CREAM TOPICAL DAILY
Qty: 60 G | Refills: 0 | Status: SHIPPED | OUTPATIENT
Start: 2022-06-24 | End: 2024-02-23 | Stop reason: SDUPTHER

## 2022-06-24 RX ORDER — IBUPROFEN 800 MG/1
800 TABLET ORAL EVERY 8 HOURS PRN
Qty: 30 TABLET | Refills: 12 | Status: SHIPPED | OUTPATIENT
Start: 2022-06-24 | End: 2022-08-17 | Stop reason: SDUPTHER

## 2022-06-24 NOTE — PROGRESS NOTES
The patient presents for routine gyn visit.  No cycles with IUD placed since Nov 16, 2022 (in the OR).  Had h/o Retained Mirena IUD which was removed with insertion of new IUD in the OR in Nov 2020.  Patient declines STD testing.  Concerned about getting hot and having vaginal bumps that burst during this time.  No other gyn complaints.    ROS:  GENERAL: Denies weight gain or weight loss. Feeling well overall.   SKIN: Denies rash or lesions.   HEAD: Denies head injury or headache.   CHEST: Denies chest pain or shortness of breath.   CARDIOVASCULAR: Denies palpitations or left sided chest pain.   ABDOMEN: No abdominal pain, constipation, diarrhea, nausea, vomiting or rectal bleeding.   URINARY: No frequency, dysuria, hematuria, or burning on urination.  REPRODUCTIVE: See HPI.   BREASTS: denies pain, lumps, or nipple discharge.   HEMATOLOGIC: No easy bruisability or excessive bleeding.   MUSCULOSKELETAL: Denies joint pain or swelling.   NEUROLOGIC: Denies syncope or weakness.   PSYCHIATRIC: Denies depression, anxiety or mood swings.         PE:   Vitals: /79   Wt 80.1 kg (176 lb 9.4 oz)   BMI 33.37 kg/m²   APPEARANCE: Well nourished, well developed, in no acute distress.  SKIN: Normal skin turgor, no lesions.  HEART: Regular rate and rhythm, no murmurs, rubs or gallops.  ABDOMEN: Soft. No tenderness or masses. No hepatosplenomegaly. No hernias.  BREASTS: Symmetrical, no skin changes or visible lesions. No palpable masses, nipple discharge or adenopathy bilaterally.  PELVIC: Normal external female genitalia without lesions. Normal hair distribution. Adequate perineal body, normal urethral meatus. Vagina moist and well rugated without lesions or discharge. Cervix pink and without lesions. No significant cystocele or rectocele. Bimanual exam showed uterus normal size, shape, position, mobile and nontender. Adnexa without masses or tenderness. Urethra and bladder normal.  IUD string visualized.      AP  Routine  gyn  -s/p normal breast exam:   -s/p normal pelvic exam:   -Pap and HPV: collected  -STD testing: declined  -contraception: IUD in place  -rx for motrin sent for dysmenorrhea  -rx for anti fungal meds sent as well    sonya mandujano MD

## 2022-12-14 ENCOUNTER — TELEPHONE (OUTPATIENT)
Dept: NEUROLOGY | Facility: CLINIC | Age: 32
End: 2022-12-14
Payer: COMMERCIAL

## 2022-12-14 RX ORDER — IBUPROFEN 800 MG/1
800 TABLET ORAL EVERY 8 HOURS PRN
Qty: 20 TABLET | Refills: 0 | Status: SHIPPED | OUTPATIENT
Start: 2022-12-14 | End: 2023-01-09 | Stop reason: SDUPTHER

## 2022-12-14 NOTE — TELEPHONE ENCOUNTER
Called pt to move up today's appt. Pt said she would like to reschedule but then she said she will schedule through the portal. She also requested a refill for ibuprofen. Stacie said she will send a one month refill to the pharmacy for her.

## 2023-01-09 ENCOUNTER — OFFICE VISIT (OUTPATIENT)
Dept: FAMILY MEDICINE | Facility: CLINIC | Age: 33
End: 2023-01-09
Payer: COMMERCIAL

## 2023-01-09 VITALS
HEART RATE: 78 BPM | SYSTOLIC BLOOD PRESSURE: 102 MMHG | WEIGHT: 183.88 LBS | DIASTOLIC BLOOD PRESSURE: 66 MMHG | HEIGHT: 61 IN | BODY MASS INDEX: 34.72 KG/M2 | OXYGEN SATURATION: 99 %

## 2023-01-09 DIAGNOSIS — J41.1 MUCOPURULENT CHRONIC BRONCHITIS: ICD-10-CM

## 2023-01-09 DIAGNOSIS — M79.10 MUSCULAR PAIN: ICD-10-CM

## 2023-01-09 DIAGNOSIS — R68.89 FLU-LIKE SYMPTOMS: ICD-10-CM

## 2023-01-09 DIAGNOSIS — Z20.822 SUSPECTED COVID-19 VIRUS INFECTION: Primary | ICD-10-CM

## 2023-01-09 DIAGNOSIS — G43.019 INTRACTABLE MIGRAINE WITHOUT AURA AND WITHOUT STATUS MIGRAINOSUS: ICD-10-CM

## 2023-01-09 LAB
CTP QC/QA: YES
CTP QC/QA: YES
FLUAV AG NPH QL: NEGATIVE
FLUBV AG NPH QL: NEGATIVE
SARS-COV-2 RDRP RESP QL NAA+PROBE: NEGATIVE

## 2023-01-09 PROCEDURE — 99215 OFFICE O/P EST HI 40 MIN: CPT | Mod: S$GLB,,, | Performed by: FAMILY MEDICINE

## 2023-01-09 PROCEDURE — 87804 INFLUENZA ASSAY W/OPTIC: CPT | Mod: 59,QW,S$GLB, | Performed by: FAMILY MEDICINE

## 2023-01-09 PROCEDURE — 1160F RVW MEDS BY RX/DR IN RCRD: CPT | Mod: CPTII,S$GLB,, | Performed by: FAMILY MEDICINE

## 2023-01-09 PROCEDURE — 87635 SARS-COV-2 COVID-19 AMP PRB: CPT | Mod: QW,S$GLB,, | Performed by: FAMILY MEDICINE

## 2023-01-09 PROCEDURE — 87804 POCT INFLUENZA A/B: ICD-10-PCS | Mod: 59,QW,S$GLB, | Performed by: FAMILY MEDICINE

## 2023-01-09 PROCEDURE — 87635: ICD-10-PCS | Mod: QW,S$GLB,, | Performed by: FAMILY MEDICINE

## 2023-01-09 PROCEDURE — 1159F PR MEDICATION LIST DOCUMENTED IN MEDICAL RECORD: ICD-10-PCS | Mod: CPTII,S$GLB,, | Performed by: FAMILY MEDICINE

## 2023-01-09 PROCEDURE — 3078F PR MOST RECENT DIASTOLIC BLOOD PRESSURE < 80 MM HG: ICD-10-PCS | Mod: CPTII,S$GLB,, | Performed by: FAMILY MEDICINE

## 2023-01-09 PROCEDURE — 3008F BODY MASS INDEX DOCD: CPT | Mod: CPTII,S$GLB,, | Performed by: FAMILY MEDICINE

## 2023-01-09 PROCEDURE — 3074F PR MOST RECENT SYSTOLIC BLOOD PRESSURE < 130 MM HG: ICD-10-PCS | Mod: CPTII,S$GLB,, | Performed by: FAMILY MEDICINE

## 2023-01-09 PROCEDURE — 3078F DIAST BP <80 MM HG: CPT | Mod: CPTII,S$GLB,, | Performed by: FAMILY MEDICINE

## 2023-01-09 PROCEDURE — 3008F PR BODY MASS INDEX (BMI) DOCUMENTED: ICD-10-PCS | Mod: CPTII,S$GLB,, | Performed by: FAMILY MEDICINE

## 2023-01-09 PROCEDURE — 99999 PR PBB SHADOW E&M-EST. PATIENT-LVL III: ICD-10-PCS | Mod: PBBFAC,,, | Performed by: FAMILY MEDICINE

## 2023-01-09 PROCEDURE — 1160F PR REVIEW ALL MEDS BY PRESCRIBER/CLIN PHARMACIST DOCUMENTED: ICD-10-PCS | Mod: CPTII,S$GLB,, | Performed by: FAMILY MEDICINE

## 2023-01-09 PROCEDURE — 1159F MED LIST DOCD IN RCRD: CPT | Mod: CPTII,S$GLB,, | Performed by: FAMILY MEDICINE

## 2023-01-09 PROCEDURE — 3074F SYST BP LT 130 MM HG: CPT | Mod: CPTII,S$GLB,, | Performed by: FAMILY MEDICINE

## 2023-01-09 PROCEDURE — 99215 PR OFFICE/OUTPT VISIT, EST, LEVL V, 40-54 MIN: ICD-10-PCS | Mod: S$GLB,,, | Performed by: FAMILY MEDICINE

## 2023-01-09 PROCEDURE — 99999 PR PBB SHADOW E&M-EST. PATIENT-LVL III: CPT | Mod: PBBFAC,,, | Performed by: FAMILY MEDICINE

## 2023-01-09 RX ORDER — PROMETHAZINE HYDROCHLORIDE AND DEXTROMETHORPHAN HYDROBROMIDE 6.25; 15 MG/5ML; MG/5ML
5 SYRUP ORAL 4 TIMES DAILY PRN
Qty: 240 ML | Refills: 0 | Status: SHIPPED | OUTPATIENT
Start: 2023-01-09 | End: 2023-01-19

## 2023-01-09 RX ORDER — AZITHROMYCIN 250 MG/1
TABLET, FILM COATED ORAL
Qty: 6 TABLET | Refills: 0 | Status: SHIPPED | OUTPATIENT
Start: 2023-01-09 | End: 2024-02-23

## 2023-01-09 RX ORDER — IBUPROFEN 800 MG/1
800 TABLET ORAL EVERY 8 HOURS PRN
Qty: 30 TABLET | Refills: 0 | Status: SHIPPED | OUTPATIENT
Start: 2023-01-09 | End: 2023-02-04 | Stop reason: SDUPTHER

## 2023-01-09 RX ORDER — TOPIRAMATE 50 MG/1
50 TABLET, FILM COATED ORAL NIGHTLY
Qty: 90 TABLET | Refills: 3 | Status: SHIPPED | OUTPATIENT
Start: 2023-01-09 | End: 2024-01-09

## 2023-01-09 NOTE — PROGRESS NOTES
Subjective:       Patient ID: Jessi Samson is a 32 y.o. female.    Chief Complaint: Cough, Sore Throat, Chest Pain, Nasal Congestion, and Headache    32 years old female came to the clinic with cough and congestion for the last month.  The cough is productive with brownish sputum.  Patient with general malaise and headaches.  Patient with a BMI of 34 currently trying lose weight.  Patient is requesting Belviq to help with weight loss.    Cough  Associated symptoms include chest pain, headaches and a sore throat. Pertinent negatives include no ear pain, fever, postnasal drip, rhinorrhea, shortness of breath or wheezing.   Sore Throat   Associated symptoms include coughing and headaches. Pertinent negatives include no abdominal pain, congestion, diarrhea, ear discharge, ear pain, shortness of breath or vomiting.   Chest Pain   Associated symptoms include a cough and headaches. Pertinent negatives include no abdominal pain, dizziness, fever, nausea, palpitations, shortness of breath or vomiting.   Pertinent negatives for past medical history include no seizures.   Headache   Associated symptoms include coughing and a sore throat. Pertinent negatives include no abdominal pain, dizziness, ear pain, eye pain, fever, hearing loss, nausea, rhinorrhea, seizures or vomiting.   Review of Systems   Constitutional: Negative.  Negative for activity change, fatigue and fever.   HENT:  Positive for sore throat. Negative for nasal congestion, dental problem, ear discharge, ear pain, hearing loss, postnasal drip, rhinorrhea and voice change.    Eyes: Negative.  Negative for pain, discharge, itching and visual disturbance.   Respiratory:  Positive for cough. Negative for chest tightness, shortness of breath and wheezing.    Cardiovascular:  Positive for chest pain. Negative for palpitations.   Gastrointestinal: Negative.  Negative for abdominal pain, blood in stool, diarrhea, nausea and vomiting.   Genitourinary: Negative.   Negative for difficulty urinating, dyspareunia, dysuria, hematuria, menstrual problem, pelvic pain, urgency, vaginal bleeding, vaginal discharge and vaginal pain.   Musculoskeletal: Negative.  Negative for arthralgias and gait problem.   Integumentary:  Negative for wound.   Neurological:  Positive for headaches. Negative for dizziness and seizures.   Hematological:  Negative for adenopathy. Does not bruise/bleed easily.   Psychiatric/Behavioral: Negative.  Negative for behavioral problems, decreased concentration, sleep disturbance and suicidal ideas. The patient is not nervous/anxious.        Objective:      Physical Exam  Constitutional:       General: She is not in acute distress.     Appearance: She is well-developed. She is not diaphoretic.   HENT:      Head: Normocephalic and atraumatic.      Right Ear: External ear normal.      Left Ear: External ear normal.      Nose: Nose normal.      Mouth/Throat:      Pharynx: No oropharyngeal exudate.   Eyes:      General: No scleral icterus.        Right eye: No discharge.         Left eye: No discharge.      Conjunctiva/sclera: Conjunctivae normal.      Pupils: Pupils are equal, round, and reactive to light.   Neck:      Thyroid: No thyromegaly.      Vascular: No JVD.      Trachea: No tracheal deviation.   Cardiovascular:      Rate and Rhythm: Normal rate and regular rhythm.      Heart sounds: Normal heart sounds. No murmur heard.    No friction rub. No gallop.   Pulmonary:      Effort: Pulmonary effort is normal. No respiratory distress.      Breath sounds: No stridor. Examination of the right-middle field reveals rhonchi. Examination of the left-middle field reveals rhonchi. Rhonchi present. No wheezing or rales.   Chest:      Chest wall: No tenderness.   Abdominal:      General: Bowel sounds are normal. There is no distension.      Palpations: Abdomen is soft. There is no mass.      Tenderness: There is no abdominal tenderness. There is no guarding or rebound.    Musculoskeletal:         General: No tenderness. Normal range of motion.      Cervical back: Normal range of motion and neck supple.   Lymphadenopathy:      Cervical: No cervical adenopathy.   Skin:     General: Skin is warm and dry.      Coloration: Skin is not pale.      Findings: No erythema or rash.   Neurological:      Mental Status: She is alert and oriented to person, place, and time.      Cranial Nerves: No cranial nerve deficit.      Motor: No abnormal muscle tone.      Coordination: Coordination normal.      Deep Tendon Reflexes: Reflexes are normal and symmetric.   Psychiatric:         Behavior: Behavior normal.         Thought Content: Thought content normal.         Judgment: Judgment normal.       Assessment:       Problem List Items Addressed This Visit       Intractable migraine without aura and without status migrainosus    Relevant Medications    topiramate (TOPAMAX) 50 MG tablet     Other Visit Diagnoses       Suspected COVID-19 virus infection    -  Primary    Relevant Orders    POCT COVID-19 Rapid Screening (Completed)    Flu-like symptoms        Relevant Orders    POCT Influenza A/B (Completed)    Muscular pain        Relevant Medications    ibuprofen (ADVIL,MOTRIN) 800 MG tablet    Mucopurulent chronic bronchitis        Relevant Medications    azithromycin (ZITHROMAX Z-KOREY) 250 MG tablet    promethazine-dextromethorphan (PROMETHAZINE-DM) 6.25-15 mg/5 mL Syrp    BMI 34.0-34.9,adult        Relevant Medications    lorcaserin (BELVIQ) 10 mg Tab              Plan:         Jessi was seen today for cough, sore throat, chest pain, nasal congestion and headache.    Diagnoses and all orders for this visit:    Suspected COVID-19 virus infection  -     POCT COVID-19 Rapid Screening    Flu-like symptoms  -     POCT Influenza A/B    Muscular pain  -     ibuprofen (ADVIL,MOTRIN) 800 MG tablet; Take 1 tablet (800 mg total) by mouth every 8 (eight) hours as needed for Pain (headache).    Mucopurulent  chronic bronchitis  -     azithromycin (ZITHROMAX Z-KOREY) 250 MG tablet; 2 tabs  by mouth first day then 1 tab by mouth daily  -     promethazine-dextromethorphan (PROMETHAZINE-DM) 6.25-15 mg/5 mL Syrp; Take 5 mLs by mouth 4 (four) times daily as needed (cough).    Intractable migraine without aura and without status migrainosus  -     topiramate (TOPAMAX) 50 MG tablet; Take 1 tablet (50 mg total) by mouth every evening.    BMI 34.0-34.9,adult  -     lorcaserin (BELVIQ) 10 mg Tab; Take 10 mg by mouth 2 (two) times daily.

## 2023-04-04 ENCOUNTER — PATIENT MESSAGE (OUTPATIENT)
Dept: FAMILY MEDICINE | Facility: CLINIC | Age: 33
End: 2023-04-04
Payer: COMMERCIAL

## 2023-04-05 DIAGNOSIS — R21 RASH: Primary | ICD-10-CM

## 2023-05-02 DIAGNOSIS — M79.10 MUSCULAR PAIN: ICD-10-CM

## 2023-05-02 RX ORDER — IBUPROFEN 800 MG/1
800 TABLET ORAL EVERY 8 HOURS PRN
Qty: 30 TABLET | Refills: 0 | Status: SHIPPED | OUTPATIENT
Start: 2023-05-02 | End: 2023-06-07 | Stop reason: SDUPTHER

## 2023-06-07 DIAGNOSIS — M79.10 MUSCULAR PAIN: ICD-10-CM

## 2023-06-08 RX ORDER — IBUPROFEN 800 MG/1
800 TABLET ORAL EVERY 8 HOURS PRN
Qty: 30 TABLET | Refills: 0 | Status: SHIPPED | OUTPATIENT
Start: 2023-06-08 | End: 2023-07-10 | Stop reason: SDUPTHER

## 2023-06-08 NOTE — TELEPHONE ENCOUNTER
Refill Routing Note   Medication(s) are not appropriate for processing by [facility] for the following reason(s):      Medication outside of protocol    [facility] action(s):  Route Care Due:  None identified          Appointments  past 12m or future 3m with PCP    Date Provider   Last Visit   1/9/2023 Alexx Apodaca MD   Next Visit   7/10/2023 Alexx Apodaca MD   ED visits in past 90 days: 0        Note composed:7:06 AM 06/08/2023

## 2023-07-10 ENCOUNTER — OFFICE VISIT (OUTPATIENT)
Dept: FAMILY MEDICINE | Facility: CLINIC | Age: 33
End: 2023-07-10
Payer: COMMERCIAL

## 2023-07-10 ENCOUNTER — LAB VISIT (OUTPATIENT)
Dept: LAB | Facility: HOSPITAL | Age: 33
End: 2023-07-10
Attending: FAMILY MEDICINE
Payer: COMMERCIAL

## 2023-07-10 VITALS
OXYGEN SATURATION: 98 % | SYSTOLIC BLOOD PRESSURE: 136 MMHG | DIASTOLIC BLOOD PRESSURE: 70 MMHG | WEIGHT: 186.06 LBS | HEIGHT: 61 IN | HEART RATE: 82 BPM | BODY MASS INDEX: 35.13 KG/M2

## 2023-07-10 DIAGNOSIS — J06.9 UPPER RESPIRATORY TRACT INFECTION, UNSPECIFIED TYPE: ICD-10-CM

## 2023-07-10 DIAGNOSIS — R10.9 LEFT FLANK PAIN: ICD-10-CM

## 2023-07-10 DIAGNOSIS — J02.9 PHARYNGITIS, UNSPECIFIED ETIOLOGY: ICD-10-CM

## 2023-07-10 DIAGNOSIS — R10.9 LEFT FLANK PAIN: Primary | ICD-10-CM

## 2023-07-10 DIAGNOSIS — F33.1 MODERATE EPISODE OF RECURRENT MAJOR DEPRESSIVE DISORDER: ICD-10-CM

## 2023-07-10 DIAGNOSIS — M79.641 BILATERAL HAND PAIN: ICD-10-CM

## 2023-07-10 DIAGNOSIS — M79.642 BILATERAL HAND PAIN: ICD-10-CM

## 2023-07-10 DIAGNOSIS — M79.10 MUSCULAR PAIN: ICD-10-CM

## 2023-07-10 LAB
BASOPHILS # BLD AUTO: 0.02 K/UL (ref 0–0.2)
BASOPHILS NFR BLD: 0.2 % (ref 0–1.9)
DIFFERENTIAL METHOD: ABNORMAL
EOSINOPHIL # BLD AUTO: 0.2 K/UL (ref 0–0.5)
EOSINOPHIL NFR BLD: 1.9 % (ref 0–8)
ERYTHROCYTE [DISTWIDTH] IN BLOOD BY AUTOMATED COUNT: 13.2 % (ref 11.5–14.5)
HCT VFR BLD AUTO: 41 % (ref 37–48.5)
HGB BLD-MCNC: 12.7 G/DL (ref 12–16)
IMM GRANULOCYTES # BLD AUTO: 0.02 K/UL (ref 0–0.04)
IMM GRANULOCYTES NFR BLD AUTO: 0.2 % (ref 0–0.5)
LYMPHOCYTES # BLD AUTO: 2.2 K/UL (ref 1–4.8)
LYMPHOCYTES NFR BLD: 26.1 % (ref 18–48)
MCH RBC QN AUTO: 28.7 PG (ref 27–31)
MCHC RBC AUTO-ENTMCNC: 31 G/DL (ref 32–36)
MCV RBC AUTO: 93 FL (ref 82–98)
MONOCYTES # BLD AUTO: 0.7 K/UL (ref 0.3–1)
MONOCYTES NFR BLD: 7.7 % (ref 4–15)
NEUTROPHILS # BLD AUTO: 5.4 K/UL (ref 1.8–7.7)
NEUTROPHILS NFR BLD: 63.9 % (ref 38–73)
NRBC BLD-RTO: 0 /100 WBC
PLATELET # BLD AUTO: 303 K/UL (ref 150–450)
PMV BLD AUTO: 9.6 FL (ref 9.2–12.9)
RBC # BLD AUTO: 4.42 M/UL (ref 4–5.4)
WBC # BLD AUTO: 8.46 K/UL (ref 3.9–12.7)

## 2023-07-10 PROCEDURE — 99999 PR PBB SHADOW E&M-EST. PATIENT-LVL IV: ICD-10-PCS | Mod: PBBFAC,,, | Performed by: FAMILY MEDICINE

## 2023-07-10 PROCEDURE — 1159F MED LIST DOCD IN RCRD: CPT | Mod: CPTII,S$GLB,, | Performed by: FAMILY MEDICINE

## 2023-07-10 PROCEDURE — 1160F RVW MEDS BY RX/DR IN RCRD: CPT | Mod: CPTII,S$GLB,, | Performed by: FAMILY MEDICINE

## 2023-07-10 PROCEDURE — 3008F BODY MASS INDEX DOCD: CPT | Mod: CPTII,S$GLB,, | Performed by: FAMILY MEDICINE

## 2023-07-10 PROCEDURE — 1159F PR MEDICATION LIST DOCUMENTED IN MEDICAL RECORD: ICD-10-PCS | Mod: CPTII,S$GLB,, | Performed by: FAMILY MEDICINE

## 2023-07-10 PROCEDURE — 3008F PR BODY MASS INDEX (BMI) DOCUMENTED: ICD-10-PCS | Mod: CPTII,S$GLB,, | Performed by: FAMILY MEDICINE

## 2023-07-10 PROCEDURE — 99215 PR OFFICE/OUTPT VISIT, EST, LEVL V, 40-54 MIN: ICD-10-PCS | Mod: S$GLB,,, | Performed by: FAMILY MEDICINE

## 2023-07-10 PROCEDURE — 3075F PR MOST RECENT SYSTOLIC BLOOD PRESS GE 130-139MM HG: ICD-10-PCS | Mod: CPTII,S$GLB,, | Performed by: FAMILY MEDICINE

## 2023-07-10 PROCEDURE — 84443 ASSAY THYROID STIM HORMONE: CPT | Performed by: FAMILY MEDICINE

## 2023-07-10 PROCEDURE — 80048 BASIC METABOLIC PNL TOTAL CA: CPT | Performed by: FAMILY MEDICINE

## 2023-07-10 PROCEDURE — 3078F DIAST BP <80 MM HG: CPT | Mod: CPTII,S$GLB,, | Performed by: FAMILY MEDICINE

## 2023-07-10 PROCEDURE — 85025 COMPLETE CBC W/AUTO DIFF WBC: CPT | Performed by: FAMILY MEDICINE

## 2023-07-10 PROCEDURE — 3075F SYST BP GE 130 - 139MM HG: CPT | Mod: CPTII,S$GLB,, | Performed by: FAMILY MEDICINE

## 2023-07-10 PROCEDURE — 3078F PR MOST RECENT DIASTOLIC BLOOD PRESSURE < 80 MM HG: ICD-10-PCS | Mod: CPTII,S$GLB,, | Performed by: FAMILY MEDICINE

## 2023-07-10 PROCEDURE — 36415 COLL VENOUS BLD VENIPUNCTURE: CPT | Mod: PO | Performed by: FAMILY MEDICINE

## 2023-07-10 PROCEDURE — 99215 OFFICE O/P EST HI 40 MIN: CPT | Mod: S$GLB,,, | Performed by: FAMILY MEDICINE

## 2023-07-10 PROCEDURE — 1160F PR REVIEW ALL MEDS BY PRESCRIBER/CLIN PHARMACIST DOCUMENTED: ICD-10-PCS | Mod: CPTII,S$GLB,, | Performed by: FAMILY MEDICINE

## 2023-07-10 PROCEDURE — 99999 PR PBB SHADOW E&M-EST. PATIENT-LVL IV: CPT | Mod: PBBFAC,,, | Performed by: FAMILY MEDICINE

## 2023-07-10 RX ORDER — DESLORATADINE 5 MG/1
5 TABLET ORAL DAILY
Qty: 30 TABLET | Refills: 0 | Status: SHIPPED | OUTPATIENT
Start: 2023-07-10 | End: 2023-08-09

## 2023-07-10 RX ORDER — PREDNISONE 5 MG/1
5 TABLET ORAL 2 TIMES DAILY
Qty: 10 TABLET | Refills: 0 | Status: SHIPPED | OUTPATIENT
Start: 2023-07-10 | End: 2023-07-15

## 2023-07-10 RX ORDER — IBUPROFEN 800 MG/1
800 TABLET ORAL EVERY 8 HOURS PRN
Qty: 90 TABLET | Refills: 0 | Status: SHIPPED | OUTPATIENT
Start: 2023-07-10 | End: 2023-09-14 | Stop reason: SDUPTHER

## 2023-07-10 RX ORDER — DESLORATADINE 5 MG/1
TABLET ORAL
Qty: 90 TABLET | OUTPATIENT
Start: 2023-07-10

## 2023-07-10 NOTE — PROGRESS NOTES
Subjective     Patient ID: Jessi Samson is a 33 y.o. female.    Chief Complaint: Muscle Pain, Nasal Congestion, and Sore Throat    33 years old female to clinic with left flank pain associated with nasal congestion, sore throat and general malaise.  Patient is concerned low red blood cell count associated with anxiety.  No suicidal or homicidal ideations.  Patient also with bilateral hand pain.  Patient with a BMI of 35 currently trying to lose weight.    Muscle Pain  Associated symptoms include arthralgias and a sore throat. Pertinent negatives include no chest pain.   Sore Throat     Review of Systems   Constitutional: Negative.    HENT:  Positive for sore throat.    Eyes: Negative.    Respiratory: Negative.     Cardiovascular:  Negative for chest pain, palpitations, leg swelling and claudication.   Gastrointestinal: Negative.    Genitourinary: Negative.    Musculoskeletal:  Positive for arthralgias.   Neurological: Negative.    Psychiatric/Behavioral:  Positive for dysphoric mood. The patient is nervous/anxious.         Objective     Physical Exam  Constitutional:       General: She is not in acute distress.     Appearance: She is well-developed. She is not diaphoretic.   HENT:      Head: Normocephalic and atraumatic.      Right Ear: External ear normal.      Left Ear: External ear normal.      Nose: Nose normal.      Mouth/Throat:      Pharynx: Posterior oropharyngeal erythema present. No pharyngeal swelling, oropharyngeal exudate or uvula swelling.   Eyes:      General: No scleral icterus.        Right eye: No discharge.         Left eye: No discharge.      Conjunctiva/sclera: Conjunctivae normal.      Pupils: Pupils are equal, round, and reactive to light.   Neck:      Thyroid: No thyromegaly.      Vascular: No JVD.      Trachea: No tracheal deviation.   Cardiovascular:      Rate and Rhythm: Normal rate and regular rhythm.      Heart sounds: Normal heart sounds. No murmur heard.    No friction rub. No  gallop.   Pulmonary:      Effort: Pulmonary effort is normal. No respiratory distress.      Breath sounds: Normal breath sounds. No stridor. No wheezing or rales.   Chest:      Chest wall: No tenderness.   Abdominal:      General: Bowel sounds are normal. There is no distension.      Palpations: Abdomen is soft. There is no mass.      Tenderness: There is no abdominal tenderness. There is no guarding or rebound.   Musculoskeletal:         General: No tenderness. Normal range of motion.      Cervical back: Normal range of motion and neck supple.   Lymphadenopathy:      Cervical: No cervical adenopathy.   Skin:     General: Skin is warm and dry.      Coloration: Skin is not pale.      Findings: No erythema or rash.   Neurological:      Mental Status: She is alert and oriented to person, place, and time.      Cranial Nerves: No cranial nerve deficit.      Motor: No abnormal muscle tone.      Coordination: Coordination normal.      Deep Tendon Reflexes: Reflexes are normal and symmetric.   Psychiatric:         Behavior: Behavior normal.         Thought Content: Thought content normal.         Judgment: Judgment normal.          Assessment and Plan     1. Left flank pain  -     CBC Auto Differential; Future; Expected date: 07/10/2023  -     Basic Metabolic Panel; Future; Expected date: 07/10/2023  -     Urinalysis; Future  -     TSH; Future; Expected date: 07/10/2023    2. Bilateral hand pain  -     predniSONE (DELTASONE) 5 MG tablet; Take 1 tablet (5 mg total) by mouth 2 (two) times daily. for 5 days  Dispense: 10 tablet; Refill: 0    3. Upper respiratory tract infection, unspecified type  -     CBC Auto Differential; Future; Expected date: 07/10/2023  -     Basic Metabolic Panel; Future; Expected date: 07/10/2023  -     Urinalysis; Future  -     predniSONE (DELTASONE) 5 MG tablet; Take 1 tablet (5 mg total) by mouth 2 (two) times daily. for 5 days  Dispense: 10 tablet; Refill: 0  -     desloratadine (CLARINEX) 5 mg  tablet; Take 1 tablet (5 mg total) by mouth once daily.  Dispense: 30 tablet; Refill: 0    4. Pharyngitis, unspecified etiology  -     CBC Auto Differential; Future; Expected date: 07/10/2023  -     Basic Metabolic Panel; Future; Expected date: 07/10/2023  -     Urinalysis; Future    5. Muscular pain  -     ibuprofen (ADVIL,MOTRIN) 800 MG tablet; Take 1 tablet (800 mg total) by mouth every 8 (eight) hours as needed for Pain (headache).  Dispense: 90 tablet; Refill: 0    6. Moderate episode of recurrent major depressive disorder                 Follow up in about 6 months (around 1/10/2024), or if symptoms worsen or fail to improve.

## 2023-07-10 NOTE — TELEPHONE ENCOUNTER
No care due was identified.  [facility] Embedded Care Due Messages. Reference number: [id number].   7/10/2023 9:54:52 AM CDT

## 2023-07-10 NOTE — TELEPHONE ENCOUNTER
Refill Decision Note   Jessi Samson  is requesting a refill authorization.  Brief Assessment and Rationale for Refill:  Quick Discontinue     Medication Therapy Plan:  E-Prescribing Status: Receipt confirmed by pharmacy (7/10/2023  9:36 AM CDT)      Comments:     Note composed:12:05 PM 07/10/2023             Appointments     Last Visit   7/10/2023 Alexx Apodaca MD   Next Visit   Visit date not found Alexx Apodaca MD           Appointments     Last Visit   7/10/2023 Alexx Apodaca MD   Next Visit   Visit date not found Alexx Apodaca MD

## 2023-07-11 DIAGNOSIS — N39.0 URINARY TRACT INFECTION WITHOUT HEMATURIA, SITE UNSPECIFIED: Primary | ICD-10-CM

## 2023-07-11 LAB
ANION GAP SERPL CALC-SCNC: 11 MMOL/L (ref 8–16)
BUN SERPL-MCNC: 14 MG/DL (ref 6–20)
CALCIUM SERPL-MCNC: 9 MG/DL (ref 8.7–10.5)
CHLORIDE SERPL-SCNC: 107 MMOL/L (ref 95–110)
CO2 SERPL-SCNC: 20 MMOL/L (ref 23–29)
CREAT SERPL-MCNC: 0.7 MG/DL (ref 0.5–1.4)
EST. GFR  (NO RACE VARIABLE): >60 ML/MIN/1.73 M^2
GLUCOSE SERPL-MCNC: 91 MG/DL (ref 70–110)
POTASSIUM SERPL-SCNC: 4.2 MMOL/L (ref 3.5–5.1)
SODIUM SERPL-SCNC: 138 MMOL/L (ref 136–145)
TSH SERPL DL<=0.005 MIU/L-ACNC: 1.6 UIU/ML (ref 0.4–4)

## 2023-07-11 RX ORDER — SULFAMETHOXAZOLE AND TRIMETHOPRIM 800; 160 MG/1; MG/1
1 TABLET ORAL 2 TIMES DAILY
Qty: 14 TABLET | Refills: 0 | Status: SHIPPED | OUTPATIENT
Start: 2023-07-11 | End: 2023-07-18

## 2023-07-12 ENCOUNTER — TELEPHONE (OUTPATIENT)
Dept: FAMILY MEDICINE | Facility: CLINIC | Age: 33
End: 2023-07-12
Payer: COMMERCIAL

## 2023-07-12 NOTE — TELEPHONE ENCOUNTER
----- Message from Alexx Apodaca MD sent at 7/11/2023  2:09 AM CDT -----  Please notify the patient.   Urine with evidence of early infection.  Antibiotic was sent to the pharmacy.

## 2023-08-18 ENCOUNTER — OFFICE VISIT (OUTPATIENT)
Dept: DERMATOLOGY | Facility: CLINIC | Age: 33
End: 2023-08-18
Payer: COMMERCIAL

## 2023-08-18 DIAGNOSIS — L83 CONFLUENT AND RETICULATED PAPILLOMATOSIS (CARP): ICD-10-CM

## 2023-08-18 DIAGNOSIS — D22.9 MULTIPLE BENIGN NEVI: Primary | ICD-10-CM

## 2023-08-18 DIAGNOSIS — L91.8 SKIN TAG: ICD-10-CM

## 2023-08-18 DIAGNOSIS — D22.9 NEVUS SPILUS: ICD-10-CM

## 2023-08-18 PROCEDURE — 1159F PR MEDICATION LIST DOCUMENTED IN MEDICAL RECORD: ICD-10-PCS | Mod: CPTII,S$GLB,, | Performed by: STUDENT IN AN ORGANIZED HEALTH CARE EDUCATION/TRAINING PROGRAM

## 2023-08-18 PROCEDURE — 1160F PR REVIEW ALL MEDS BY PRESCRIBER/CLIN PHARMACIST DOCUMENTED: ICD-10-PCS | Mod: CPTII,S$GLB,, | Performed by: STUDENT IN AN ORGANIZED HEALTH CARE EDUCATION/TRAINING PROGRAM

## 2023-08-18 PROCEDURE — 99999 PR PBB SHADOW E&M-EST. PATIENT-LVL III: CPT | Mod: PBBFAC,,, | Performed by: STUDENT IN AN ORGANIZED HEALTH CARE EDUCATION/TRAINING PROGRAM

## 2023-08-18 PROCEDURE — 1159F MED LIST DOCD IN RCRD: CPT | Mod: CPTII,S$GLB,, | Performed by: STUDENT IN AN ORGANIZED HEALTH CARE EDUCATION/TRAINING PROGRAM

## 2023-08-18 PROCEDURE — 99204 PR OFFICE/OUTPT VISIT, NEW, LEVL IV, 45-59 MIN: ICD-10-PCS | Mod: S$GLB,,, | Performed by: STUDENT IN AN ORGANIZED HEALTH CARE EDUCATION/TRAINING PROGRAM

## 2023-08-18 PROCEDURE — 99999 PR PBB SHADOW E&M-EST. PATIENT-LVL III: ICD-10-PCS | Mod: PBBFAC,,, | Performed by: STUDENT IN AN ORGANIZED HEALTH CARE EDUCATION/TRAINING PROGRAM

## 2023-08-18 PROCEDURE — 1160F RVW MEDS BY RX/DR IN RCRD: CPT | Mod: CPTII,S$GLB,, | Performed by: STUDENT IN AN ORGANIZED HEALTH CARE EDUCATION/TRAINING PROGRAM

## 2023-08-18 PROCEDURE — 99204 OFFICE O/P NEW MOD 45 MIN: CPT | Mod: S$GLB,,, | Performed by: STUDENT IN AN ORGANIZED HEALTH CARE EDUCATION/TRAINING PROGRAM

## 2023-08-18 RX ORDER — DOXYCYCLINE 100 MG/1
100 CAPSULE ORAL 2 TIMES DAILY
Qty: 60 CAPSULE | Refills: 1 | Status: SHIPPED | OUTPATIENT
Start: 2023-08-18 | End: 2023-10-17

## 2023-08-18 RX ORDER — KETOCONAZOLE 20 MG/G
CREAM TOPICAL 2 TIMES DAILY
Qty: 60 G | Refills: 3 | Status: SHIPPED | OUTPATIENT
Start: 2023-08-18

## 2023-08-18 NOTE — PROGRESS NOTES
Subjective:      Patient ID:  Jessi Samson is a 33 y.o. female who presents for   Chief Complaint   Patient presents with    Acne     Face, neck     Acne - Initial  Affected locations: face and neck  Duration: 6 months  Signs / symptoms: redness and itching  Relieving factors/Treatments tried: OTC moisturizer  Improvement on treatment: no relief       # [id number]    Review of Systems   Skin:  Positive for itching and rash.       Objective:   Physical Exam   Constitutional: She appears well-developed and well-nourished. No distress.   Neurological: She is alert and oriented to person, place, and time. She is not disoriented.   Psychiatric: She has a normal mood and affect.   Skin:   Areas Examined (abnormalities noted in diagram):   Head / Face Inspection Performed  Neck Inspection Performed  Chest / Axilla Inspection Performed                 Diagram Legend     Erythematous scaling macule/papule c/w actinic keratosis       Vascular papule c/w angioma      Pigmented verrucoid papule/plaque c/w seborrheic keratosis      Yellow umbilicated papule c/w sebaceous hyperplasia      Irregularly shaped tan macule c/w lentigo     1-2 mm smooth white papules consistent with Milia      Movable subcutaneous cyst with punctum c/w epidermal inclusion cyst      Subcutaneous movable cyst c/w pilar cyst      Firm pink to brown papule c/w dermatofibroma      Pedunculated fleshy papule(s) c/w skin tag(s)      Evenly pigmented macule c/w junctional nevus     Mildly variegated pigmented, slightly irregular-bordered macule c/w mildly atypical nevus      Flesh colored to evenly pigmented papule c/w intradermal nevus       Pink pearly papule/plaque c/w basal cell carcinoma      Erythematous hyperkeratotic cursted plaque c/w SCC      Surgical scar with no sign of skin cancer recurrence      Open and closed comedones      Inflammatory papules and pustules      Verrucoid papule consistent consistent with wart     Erythematous  eczematous patches and plaques     Dystrophic onycholytic nail with subungual debris c/w onychomycosis     Umbilicated papule    Erythematous-base heme-crusted tan verrucoid plaque consistent with inflamed seborrheic keratosis     Erythematous Silvery Scaling Plaque c/w Psoriasis     See annotation      Assessment / Plan:        Multiple benign nevi  Discussed ABCDE's of nevi.  Monitor for new mole or moles that are becoming bigger, darker, irritated, or developing irregular borders. Instructed patient to observe lesion(s) for changes and follow up in clinic if changes are noted. Patient to monitor skin at home for new or changing lesions.     Skin tag  Reassurance given to patient. No treatment is necessary.   Treatment of benign, asymptomatic lesions may be considered cosmetic.    Nevus spilus  Reassurance given to patient. No treatment is necessary. RTC if growing or changing    CARP--Chronic condition, not at goal  - somewhat reticulated hyperpigmented patches on chest. Favor CARP. TV, AN also on ddx  - will tx with doxy and keto cream bid    -Patient counseled on r/b/ae of Doxycycline, including but not limited to GI distress and photosensitivity; counseled to take with water and meals to avoid GI upset, and to avoid lying flat for 2 hours following pill. Counseled to stop taking if patient develops adverse effects including but not limited to headache, dizziness, rash.   - Start doxycycline 100 mg BID          Follow up if symptoms worsen or fail to improve.

## 2023-09-14 DIAGNOSIS — M79.10 MUSCULAR PAIN: ICD-10-CM

## 2023-09-14 RX ORDER — IBUPROFEN 800 MG/1
800 TABLET ORAL EVERY 8 HOURS PRN
Qty: 90 TABLET | Refills: 0 | Status: SHIPPED | OUTPATIENT
Start: 2023-09-14 | End: 2024-02-23 | Stop reason: SDUPTHER

## 2023-12-06 ENCOUNTER — HOSPITAL ENCOUNTER (EMERGENCY)
Facility: HOSPITAL | Age: 33
Discharge: HOME | End: 2023-12-06
Attending: STUDENT IN AN ORGANIZED HEALTH CARE EDUCATION/TRAINING PROGRAM
Payer: COMMERCIAL

## 2023-12-06 VITALS
HEIGHT: 61 IN | DIASTOLIC BLOOD PRESSURE: 63 MMHG | SYSTOLIC BLOOD PRESSURE: 106 MMHG | OXYGEN SATURATION: 100 % | HEART RATE: 75 BPM | RESPIRATION RATE: 18 BRPM | TEMPERATURE: 98 F | WEIGHT: 175 LBS | BODY MASS INDEX: 33.04 KG/M2

## 2023-12-06 DIAGNOSIS — R51.9 ACUTE NONINTRACTABLE HEADACHE, UNSPECIFIED HEADACHE TYPE: ICD-10-CM

## 2023-12-06 DIAGNOSIS — R07.9 CHEST PAIN: ICD-10-CM

## 2023-12-06 DIAGNOSIS — R07.89 CHEST TIGHTNESS: ICD-10-CM

## 2023-12-06 DIAGNOSIS — R07.89 ATYPICAL CHEST PAIN: Primary | ICD-10-CM

## 2023-12-06 LAB
ALBUMIN SERPL BCP-MCNC: 4.3 G/DL (ref 3.5–5.2)
ALP SERPL-CCNC: 72 U/L (ref 55–135)
ALT SERPL W/O P-5'-P-CCNC: 35 U/L (ref 10–44)
ANION GAP SERPL CALC-SCNC: 11 MMOL/L (ref 8–16)
AST SERPL-CCNC: 20 U/L (ref 10–40)
B-HCG UR QL: NEGATIVE
BASOPHILS # BLD AUTO: 0.04 K/UL (ref 0–0.2)
BASOPHILS NFR BLD: 0.4 % (ref 0–1.9)
BILIRUB SERPL-MCNC: 0.5 MG/DL (ref 0.1–1)
BILIRUB UR QL STRIP: NEGATIVE
BNP SERPL-MCNC: <10 PG/ML (ref 0–99)
BUN SERPL-MCNC: 8 MG/DL (ref 6–20)
CALCIUM SERPL-MCNC: 9 MG/DL (ref 8.7–10.5)
CHLORIDE SERPL-SCNC: 106 MMOL/L (ref 95–110)
CLARITY UR: CLEAR
CO2 SERPL-SCNC: 22 MMOL/L (ref 23–29)
COLOR UR: COLORLESS
CREAT SERPL-MCNC: 0.7 MG/DL (ref 0.5–1.4)
DIFFERENTIAL METHOD: NORMAL
EOSINOPHIL # BLD AUTO: 0 K/UL (ref 0–0.5)
EOSINOPHIL NFR BLD: 0.4 % (ref 0–8)
ERYTHROCYTE [DISTWIDTH] IN BLOOD BY AUTOMATED COUNT: 12.2 % (ref 11.5–14.5)
EST. GFR  (NO RACE VARIABLE): >60 ML/MIN/1.73 M^2
GLUCOSE SERPL-MCNC: 107 MG/DL (ref 70–110)
GLUCOSE UR QL STRIP: NEGATIVE
HCT VFR BLD AUTO: 39.3 % (ref 37–48.5)
HGB BLD-MCNC: 13.2 G/DL (ref 12–16)
HGB UR QL STRIP: NEGATIVE
IMM GRANULOCYTES # BLD AUTO: 0.03 K/UL (ref 0–0.04)
IMM GRANULOCYTES NFR BLD AUTO: 0.3 % (ref 0–0.5)
KETONES UR QL STRIP: NEGATIVE
LEUKOCYTE ESTERASE UR QL STRIP: ABNORMAL
LYMPHOCYTES # BLD AUTO: 2.5 K/UL (ref 1–4.8)
LYMPHOCYTES NFR BLD: 27 % (ref 18–48)
MCH RBC QN AUTO: 28.9 PG (ref 27–31)
MCHC RBC AUTO-ENTMCNC: 33.6 G/DL (ref 32–36)
MCV RBC AUTO: 86 FL (ref 82–98)
MICROSCOPIC COMMENT: NORMAL
MONOCYTES # BLD AUTO: 0.5 K/UL (ref 0.3–1)
MONOCYTES NFR BLD: 5.2 % (ref 4–15)
NEUTROPHILS # BLD AUTO: 6.2 K/UL (ref 1.8–7.7)
NEUTROPHILS NFR BLD: 66.7 % (ref 38–73)
NITRITE UR QL STRIP: NEGATIVE
NRBC BLD-RTO: 0 /100 WBC
PH UR STRIP: 6 [PH] (ref 5–8)
PLATELET # BLD AUTO: 342 K/UL (ref 150–450)
PMV BLD AUTO: 9.4 FL (ref 9.2–12.9)
POTASSIUM SERPL-SCNC: 4 MMOL/L (ref 3.5–5.1)
PROT SERPL-MCNC: 7.6 G/DL (ref 6–8.4)
PROT UR QL STRIP: NEGATIVE
RBC # BLD AUTO: 4.56 M/UL (ref 4–5.4)
RBC #/AREA URNS HPF: 0 /HPF (ref 0–4)
SODIUM SERPL-SCNC: 139 MMOL/L (ref 136–145)
SP GR UR STRIP: <1.005 (ref 1–1.03)
TROPONIN I SERPL DL<=0.01 NG/ML-MCNC: 0.01 NG/ML (ref 0–0.03)
URN SPEC COLLECT METH UR: ABNORMAL
UROBILINOGEN UR STRIP-ACNC: NEGATIVE EU/DL
WBC # BLD AUTO: 9.27 K/UL (ref 3.9–12.7)
WBC #/AREA URNS HPF: 3 /HPF (ref 0–5)

## 2023-12-06 PROCEDURE — 80053 COMPREHEN METABOLIC PANEL: CPT

## 2023-12-06 PROCEDURE — 85025 COMPLETE CBC W/AUTO DIFF WBC: CPT

## 2023-12-06 PROCEDURE — 63600175 PHARM REV CODE 636 W HCPCS

## 2023-12-06 PROCEDURE — 84484 ASSAY OF TROPONIN QUANT: CPT

## 2023-12-06 PROCEDURE — 93010 EKG 12-LEAD: ICD-10-PCS | Mod: ,,, | Performed by: INTERNAL MEDICINE

## 2023-12-06 PROCEDURE — 96374 THER/PROPH/DIAG INJ IV PUSH: CPT

## 2023-12-06 PROCEDURE — 83880 ASSAY OF NATRIURETIC PEPTIDE: CPT

## 2023-12-06 PROCEDURE — 93010 ELECTROCARDIOGRAM REPORT: CPT | Mod: ,,, | Performed by: INTERNAL MEDICINE

## 2023-12-06 PROCEDURE — 81025 URINE PREGNANCY TEST: CPT | Performed by: PHYSICIAN ASSISTANT

## 2023-12-06 PROCEDURE — 93005 ELECTROCARDIOGRAM TRACING: CPT

## 2023-12-06 PROCEDURE — 96361 HYDRATE IV INFUSION ADD-ON: CPT

## 2023-12-06 PROCEDURE — 99285 EMERGENCY DEPT VISIT HI MDM: CPT | Mod: 25

## 2023-12-06 PROCEDURE — 81000 URINALYSIS NONAUTO W/SCOPE: CPT | Performed by: PHYSICIAN ASSISTANT

## 2023-12-06 PROCEDURE — 25000003 PHARM REV CODE 250

## 2023-12-06 RX ORDER — ACETAMINOPHEN 500 MG
1000 TABLET ORAL
Status: COMPLETED | OUTPATIENT
Start: 2023-12-06 | End: 2023-12-06

## 2023-12-06 RX ORDER — METHOCARBAMOL 500 MG/1
1000 TABLET, FILM COATED ORAL 3 TIMES DAILY
Qty: 30 TABLET | Refills: 0 | Status: SHIPPED | OUTPATIENT
Start: 2023-12-06 | End: 2023-12-11

## 2023-12-06 RX ORDER — KETOROLAC TROMETHAMINE 30 MG/ML
15 INJECTION, SOLUTION INTRAMUSCULAR; INTRAVENOUS
Status: COMPLETED | OUTPATIENT
Start: 2023-12-06 | End: 2023-12-06

## 2023-12-06 RX ORDER — BUTALBITAL, ACETAMINOPHEN AND CAFFEINE 50; 325; 40 MG/1; MG/1; MG/1
1 TABLET ORAL EVERY 4 HOURS PRN
Qty: 30 TABLET | Refills: 0 | Status: SHIPPED | OUTPATIENT
Start: 2023-12-06 | End: 2024-01-05

## 2023-12-06 RX ORDER — METHOCARBAMOL 500 MG/1
500 TABLET, FILM COATED ORAL
Status: COMPLETED | OUTPATIENT
Start: 2023-12-06 | End: 2023-12-06

## 2023-12-06 RX ORDER — IBUPROFEN 600 MG/1
600 TABLET ORAL EVERY 6 HOURS PRN
Qty: 20 TABLET | Refills: 0 | Status: SHIPPED | OUTPATIENT
Start: 2023-12-06 | End: 2024-02-23 | Stop reason: ALTCHOICE

## 2023-12-06 RX ORDER — ONDANSETRON 4 MG/1
4 TABLET, ORALLY DISINTEGRATING ORAL
Status: COMPLETED | OUTPATIENT
Start: 2023-12-06 | End: 2023-12-06

## 2023-12-06 RX ADMIN — ONDANSETRON 4 MG: 4 TABLET, ORALLY DISINTEGRATING ORAL at 05:12

## 2023-12-06 RX ADMIN — SODIUM CHLORIDE 500 ML: 9 INJECTION, SOLUTION INTRAVENOUS at 09:12

## 2023-12-06 RX ADMIN — KETOROLAC TROMETHAMINE 15 MG: 30 INJECTION, SOLUTION INTRAMUSCULAR; INTRAVENOUS at 05:12

## 2023-12-06 RX ADMIN — METHOCARBAMOL TABLETS 500 MG: 500 TABLET, COATED ORAL at 09:12

## 2023-12-06 RX ADMIN — ACETAMINOPHEN 1000 MG: 500 TABLET ORAL at 06:12

## 2023-12-06 NOTE — ED PROVIDER NOTES
Encounter Date: 12/6/2023       History     Chief Complaint   Patient presents with    Headache     Headache and lower back pain since yesterday. Today became nauseous and began vomiting.     33 year old female with no past medical history on file presents to ED with headache starting x few days ago.  Headache is localized to the posterior side of her head and neck.  Notes, it does not feel like her regular migraines.  Has not use any medication for her headache at home.  Associated symptoms include lower back pain, nausea, vomiting.  Reports her coworkers had similar symptoms this past week.  Patient states she had an episode of right-sided chest pain this morning work.  Chest pain occur while lifting some weights at work.  Felt tingling and numbness sensation running down her hands that lasted for a couple seconds.  Denies nausea or vomiting with these episodes.  No history of cardiac or pulmonary disease.  Patient also reports dysuria but denies urinary frequency or urgency.  No fever, abdominal pain, shortness breath.  No other acute complaints today.    The history is provided by the patient. No  was used.     Review of patient's allergies indicates:  No Known Allergies  Past Medical History:   Diagnosis Date    Encounter for blood transfusion     Headache      Past Surgical History:   Procedure Laterality Date    REMOVAL OF INTRAUTERINE DEVICE (IUD) N/A 11/16/2020    Procedure: REMOVAL, INTRAUTERINE DEVICE;  Surgeon: Long Hopkins MD;  Location: [facility];  Service: OB/GYN;  Laterality: N/A;     Family History   Problem Relation Age of Onset    Migraines Mother     Migraines Brother      Social History     Tobacco Use    Smoking status: Never    Smokeless tobacco: Never   Substance Use Topics    Alcohol use: No     Comment: occassionaly    Drug use: No     Review of Systems   Constitutional:  Positive for appetite change. Negative for chills and fever.   HENT:  Positive for rhinorrhea.  Negative for congestion.    Respiratory:  Negative for apnea, cough, chest tightness, shortness of breath and wheezing.    Cardiovascular:  Positive for chest pain. Negative for palpitations and leg swelling.   Gastrointestinal:  Negative for abdominal pain.   Genitourinary:  Positive for dysuria. Negative for flank pain, frequency, urgency and vaginal discharge.   Musculoskeletal:  Positive for back pain and neck pain. Negative for arthralgias, myalgias and neck stiffness.   Skin:  Negative for rash.   Neurological:  Positive for headaches. Negative for dizziness, weakness, light-headedness and numbness.       Physical Exam     Initial Vitals   BP Pulse Resp Temp SpO2   12/06/23 1452 12/06/23 1933 12/06/23 1452 12/06/23 1452 12/06/23 1452   133/68 62 16 98.1 °F (36.7 °C) 99 %      MAP       --                Physical Exam    Vitals reviewed.  Constitutional: She appears well-developed and well-nourished. She is not diaphoretic. No distress.   HENT:   Head: Normocephalic and atraumatic.   Right Ear: External ear normal.   Left Ear: External ear normal.   Mouth/Throat: Oropharynx is clear and moist.   Eyes: EOM are normal. Pupils are equal, round, and reactive to light.   Neck: Neck supple.   Normal range of motion.  Cardiovascular:  Normal rate and normal heart sounds.           Pulmonary/Chest: Breath sounds normal. No respiratory distress. She has no wheezes. She exhibits no tenderness.   Abdominal: Abdomen is soft. Bowel sounds are normal. She exhibits no distension. There is no abdominal tenderness. There is no rebound.   Musculoskeletal:         General: Tenderness present. No edema. Normal range of motion.      Cervical back: Normal range of motion and neck supple.      Comments: Mild R lumbar paraspinal tenderness. No C,T,L midline spine tenderness. No CVA. No body deformity or step-offs.      Neurological: She is alert and oriented to person, place, and time. No cranial nerve deficit. GCS score is 15. GCS  eye subscore is 4. GCS verbal subscore is 5. GCS motor subscore is 6.   Moves all extremities and carries on conversation. CN- II: PERRL; III/IV/VI: EOMI w/out evidence of nystagmus; V: no deficits appreciated to light touch bilateral face; VII: no facial weakness, no facial asymmetry. Eyebrow raise symmetric. Smile symmetric; IX/X: palate midline, and raises symmetrically; XI: shoulder shrug 5/5 bilaterally; XII: tongue is midline w/out asymmetry. Strength 5/5 to bilateral upper and lower extremities, sensation intact to light touch,    No nuchal rigidity/ irritation.  No meningismus.  No focal weakness   Skin: Skin is warm. Capillary refill takes less than 2 seconds. No rash noted. No erythema.   Psychiatric: She has a normal mood and affect. Her behavior is normal. Judgment and thought content normal.         ED Course   Procedures  Labs Reviewed   URINALYSIS, REFLEX TO URINE CULTURE - Abnormal; Notable for the following components:       Result Value    Color, UA Colorless (*)     Specific Gravity, UA <1.005 (*)     Leukocytes, UA Trace (*)     All other components within normal limits    Narrative:     Specimen Source->Urine   COMPREHENSIVE METABOLIC PANEL - Abnormal; Notable for the following components:    CO2 22 (*)     All other components within normal limits   CBC W/ AUTO DIFFERENTIAL   TROPONIN I   TROPONIN I   B-TYPE NATRIURETIC PEPTIDE   URINALYSIS MICROSCOPIC    Narrative:     Specimen Source->Urine   PREGNANCY TEST, URINE RAPID    Narrative:     Specimen Source->Urine   PREGNANCY TEST, URINE RAPID   TROPONIN I   TROPONIN I          Imaging Results              X-Ray Chest PA And Lateral (Final result)  Result time 12/06/23 19:15:44      Final result by Candie Alexander MD (12/06/23 19:15:44)                   Impression:      No acute abnormality.      Electronically signed by: Candie Alexander  Date:    12/06/2023  Time:    19:15               Narrative:    EXAMINATION:  XR CHEST PA AND  LATERAL    CLINICAL HISTORY:  Other chest pain    TECHNIQUE:  PA and lateral views of the chest were performed.    COMPARISON:  Chest x-ray 09/13/2020    FINDINGS:  Cardiac silhouette is stable and nonenlarged.  Lungs are clear.  There is no pleural effusion or pneumothorax.  Imaged osseous structures are intact.                                       Medications   sodium chloride 0.9% bolus 500 mL 500 mL (500 mLs Intravenous New Bag 12/6/23 2118)   ketorolac injection 15 mg (15 mg Intravenous Given 12/6/23 1705)   ondansetron disintegrating tablet 4 mg (4 mg Oral Given 12/6/23 1705)   acetaminophen tablet 1,000 mg (1,000 mg Oral Given 12/6/23 1853)   methocarbamoL tablet 500 mg (500 mg Oral Given 12/6/23 2119)     Medical Decision Making  Differential Diagnosis includes, but is not limited to:  Ischemic stroke, hemorrhagic stroke, subarachnoid hemorrhage/ruptured aneurysm, intracranial lesion/mass, meningitis/encephalitis, epidural hematoma, subdural hematoma, pseudotumor cerebri, venous sinus thrombosis, CO poisoning, hypertensive encephalopathy, MI/ACS, head trauma/contusion, concussion, sinus headache, dehydration, anxiety, medication non-compliance, primary headache (tension/cluster/migraine),  PE, aortic dissection, pneumothorax, cardiac tamponade, pericarditis/myocarditis, pneumonia, infection/abscess, lung mass, trauma/fracture, costochondritis/pleurisy, MSK pain/contusion, GERD, biliary disease, pancreatitis, anemia    ED management     33 year-old female with no past medical history on file presents to the ED with headache x few days.   Patient is not toxic appearing, hemodynamically stable and resting comfortably on bed. Afebrile with vitals WNL. No distress on exam.  Physical exam as stated above.  Patient neurologically intact.  Patient afebrile without any signs of sepsis or infection.  I do not suspect viral/bacterial meningitis, subarachnoid hemorrhage this time.  Lab/imaging results discussed on ED  course.  Patient was given Toradol,IV fluids, Robaxin and Zofran in the ED with improvement of her symptoms. My overall impression is Atypical chest pain and Headache. Pt advised to take Tylenol/ibuprofen as needed.  Negative cardiac workup.  Second troponin results still pending at this time.    I have discussed the specifics of the workup with the patient and the patient has verbalized understanding of the details of the workup, the diagnosis, the treatment plan, and the need for outpatient follow-up with PCP/neurologist. ED precautions given. Discussed with pt about returning to the ED, if symptoms fail to improve or worsen. Care of patient also discussed with my attending Dr. Blandon who agrees with assessment and plan.    RESULTS:  Documented in ED course.  Labs/ekg interpreted by myself and Dr. Blandon.             Amount and/or Complexity of Data Reviewed  Labs: ordered. Decision-making details documented in ED Course.  Radiology: ordered. Decision-making details documented in ED Course.    Risk  OTC drugs.  Prescription drug management.               ED Course as of 12/07/23 1231   Wed Dec 06, 2023   1747 BNP: <10 [NW]   1747 Troponin I: 0.006 [NW]   1748 Comprehensive metabolic panel(!)  WNL [NW]   1748 CBC auto differential  H/H stable [NW]   1748 Urinalysis, Reflex to Urine Culture Urine, Clean Catch(!)  Trace of leukocytes.  [NW]   1839 Upon re-evaluation, pt still endorses headache and neck pain. Denies chest pain at this time. Will give tylenol and reassess. Will repeat 2nd trop at 7:05 [NW]   1911 Preg Test, Ur: Negative  negative [NW]   1929 X-Ray Chest PA And Lateral  Independently reviewed by myself and radiologist.    FINDINGS:  Cardiac silhouette is stable and nonenlarged.  Lungs are clear.  There is no pleural effusion or pneumothorax.  Imaged osseous structures are intact.     Impression:     No acute abnormality   [NW]   2103 Case discussed with my attending Dr. Blandon, who agrees with my plan  and treatment. #2 trop still pending.  We will give Robaxin and IV fluids for headache and reassess. [NW]   2154 #2nd trop sample was not collected.  Nursing staff will draw sample and send to lab.  [NW]   2216 Will sign off and hand off patient to my attending Dr. Blandon at this time. [NW]   2305 Pt is currently stable for discharge. I see no indication of an emergent process beyond that addressed during our encounter but have duly counseled the patient/family regarding the need for prompt follow-up as well as the indications that should prompt immediate return to the emergency room should new or worrisome developments occur. I discussed the ED work up and diagnostic findings with the patient/family. The patient/family has been provided with verbal and printed direction regarding our final diagnosis(es) as well as instructions regarding use of OTC and/or Rx medications intended to manage the patient's aforementioned conditions. The patient/family verbalized an understanding. The patient/family is asked if there are any questions or concerns. We discuss the case, until all issues are addressed to the patient/family's satisfaction. Patient/family understands and is agreeable to the plan.  [AS]      ED Course User Index  [AS] Naomi Blandon MD  [NW] Jody Davis PA-C                             Clinical Impression:  Final diagnoses:  [R07.9] Chest pain  [R07.89] Chest tightness  [R07.89] Atypical chest pain (Primary)  [R51.9] Acute nonintractable headache, unspecified headache type                 Jody Davis PA-C  12/06/23 2216       Jody Davis PA-C  12/07/23 1232

## 2023-12-06 NOTE — DISCHARGE INSTRUCTIONS
Ms. Sawant,    Thank you for letting me care for you today! It was nice meeting you, and I hope you feel better soon.   If you would like access to your chart and what was done today please utilize the [facility].   Please don't hesitate to return if your symptoms worsen or you develop any other worrisome symptoms.    Our goal in the emergency department is to always give you outstanding care and exceptional service. You may receive a survey by mail or e-mail in the next week regarding your experience in our ED. We would greatly appreciate you completing and returning the survey. Your feedback provides us with a way to recognize our staff who give very good care and it helps us learn how to improve when your experience was below our aspiration of excellence.     Sincerely,    Jody Davis PA-C  Emergency Department Physician Assistant  [facility] [place]

## 2023-12-06 NOTE — Clinical Note
"Jessi Horton" Jese Samson was seen and treated in our emergency department on 12/6/2023.  She may return to work on 12/08/2023.       If you have any questions or concerns, please don't hesitate to call.       RN    "

## 2023-12-06 NOTE — ED TRIAGE NOTES
Patient reports headache, chills, fatigue, and nausea. Reports coworker recently tested positive for flu.    Two patient identifiers have been checked and are correct.      Appearance: Pt awake, alert & oriented to person, place & time. Pt in no acute distress at present time. Pt is clean and well groomed with clothes appropriately fastened.   Skin: Skin warm, dry & intact. Color consistent with ethnicity. Mucous membranes moist. No breakdown or brusing noted.   Musculoskeletal: Patient moving all extremities well, no obvious swelling or deformities noted.   Respiratory: Respirations spontaneous, even, and non-labored. Visible chest rise noted. Airway is open and patent. No accessory muscle use noted.   Neurologic: Sensation is intact. Speech is clear and appropriate. Eyes open spontaneously, behavior appropriate to situation, follows commands, facial expression symmetrical, bilateral hand grasp equal and even, purposeful motor response noted.  Cardiac: All peripheral pulses present. No Bilateral lower extremity edema. Cap refill is <3 seconds.  Abdomen: Abdomen soft, non-tender to palpation.   : Pt reports no dysuria or hematuria.

## 2024-02-23 ENCOUNTER — LAB VISIT (OUTPATIENT)
Dept: LAB | Facility: HOSPITAL | Age: 34
End: 2024-02-23
Attending: NURSE PRACTITIONER
Payer: COMMERCIAL

## 2024-02-23 ENCOUNTER — OFFICE VISIT (OUTPATIENT)
Dept: FAMILY MEDICINE | Facility: CLINIC | Age: 34
End: 2024-02-23
Payer: COMMERCIAL

## 2024-02-23 VITALS
HEIGHT: 61 IN | OXYGEN SATURATION: 98 % | WEIGHT: 186.63 LBS | SYSTOLIC BLOOD PRESSURE: 110 MMHG | HEART RATE: 70 BPM | BODY MASS INDEX: 35.23 KG/M2 | DIASTOLIC BLOOD PRESSURE: 68 MMHG

## 2024-02-23 DIAGNOSIS — M54.50 ACUTE MIDLINE LOW BACK PAIN WITHOUT SCIATICA: ICD-10-CM

## 2024-02-23 DIAGNOSIS — M79.10 MUSCULAR PAIN: ICD-10-CM

## 2024-02-23 DIAGNOSIS — Z01.419 WELL WOMAN EXAM: ICD-10-CM

## 2024-02-23 DIAGNOSIS — S46.811A STRAIN OF RIGHT TRAPEZIUS MUSCLE, INITIAL ENCOUNTER: Primary | ICD-10-CM

## 2024-02-23 DIAGNOSIS — J02.9 SORE THROAT: ICD-10-CM

## 2024-02-23 DIAGNOSIS — R35.0 URINARY FREQUENCY: ICD-10-CM

## 2024-02-23 DIAGNOSIS — J06.9 VIRAL URI: ICD-10-CM

## 2024-02-23 LAB
BILIRUB UR QL STRIP: NEGATIVE
CLARITY UR REFRACT.AUTO: CLEAR
COLOR UR AUTO: YELLOW
CTP QC/QA: YES
GLUCOSE UR QL STRIP: NEGATIVE
HGB UR QL STRIP: NEGATIVE
KETONES UR QL STRIP: NEGATIVE
LEUKOCYTE ESTERASE UR QL STRIP: NEGATIVE
NITRITE UR QL STRIP: NEGATIVE
PH UR STRIP: 6 [PH] (ref 5–8)
POC MOLECULAR INFLUENZA A AGN: NEGATIVE
POC MOLECULAR INFLUENZA B AGN: NEGATIVE
PROT UR QL STRIP: ABNORMAL
S PYO RRNA THROAT QL PROBE: NEGATIVE
SARS-COV-2 RDRP RESP QL NAA+PROBE: NEGATIVE
SP GR UR STRIP: 1.02 (ref 1–1.03)
URN SPEC COLLECT METH UR: ABNORMAL

## 2024-02-23 PROCEDURE — 1160F RVW MEDS BY RX/DR IN RCRD: CPT | Mod: CPTII,S$GLB,, | Performed by: NURSE PRACTITIONER

## 2024-02-23 PROCEDURE — 87635 SARS-COV-2 COVID-19 AMP PRB: CPT | Mod: QW,S$GLB,, | Performed by: NURSE PRACTITIONER

## 2024-02-23 PROCEDURE — 87502 INFLUENZA DNA AMP PROBE: CPT | Mod: QW,S$GLB,, | Performed by: NURSE PRACTITIONER

## 2024-02-23 PROCEDURE — 3008F BODY MASS INDEX DOCD: CPT | Mod: CPTII,S$GLB,, | Performed by: NURSE PRACTITIONER

## 2024-02-23 PROCEDURE — 87880 STREP A ASSAY W/OPTIC: CPT | Mod: QW,S$GLB,, | Performed by: NURSE PRACTITIONER

## 2024-02-23 PROCEDURE — 81003 URINALYSIS AUTO W/O SCOPE: CPT | Performed by: NURSE PRACTITIONER

## 2024-02-23 PROCEDURE — 99214 OFFICE O/P EST MOD 30 MIN: CPT | Mod: S$GLB,,, | Performed by: NURSE PRACTITIONER

## 2024-02-23 PROCEDURE — 3078F DIAST BP <80 MM HG: CPT | Mod: CPTII,S$GLB,, | Performed by: NURSE PRACTITIONER

## 2024-02-23 PROCEDURE — 3074F SYST BP LT 130 MM HG: CPT | Mod: CPTII,S$GLB,, | Performed by: NURSE PRACTITIONER

## 2024-02-23 PROCEDURE — 1159F MED LIST DOCD IN RCRD: CPT | Mod: CPTII,S$GLB,, | Performed by: NURSE PRACTITIONER

## 2024-02-23 PROCEDURE — 99999 PR PBB SHADOW E&M-EST. PATIENT-LVL IV: CPT | Mod: PBBFAC,,, | Performed by: NURSE PRACTITIONER

## 2024-02-23 RX ORDER — IBUPROFEN 800 MG/1
800 TABLET ORAL 2 TIMES DAILY PRN
Qty: 30 TABLET | Refills: 0 | Status: SHIPPED | OUTPATIENT
Start: 2024-02-23 | End: 2024-05-10 | Stop reason: SDUPTHER

## 2024-02-23 NOTE — PROGRESS NOTES
Subjective     Patient ID: Jessi Samson is a 33 y.o. female.    Chief Complaint: Shoulder Pain and Back Pain    HPI    This is a 33 y.o. yo female patient of Dr. Ricardo who is known to me. She presents today with c/o Shoulder Pain and Back Pain  - PMH includes    Patient Active Problem List   Diagnosis    Hearing decreased, bilateral    E coli bacteremia    Tachycardia    Flank pain    Complicated UTI (urinary tract infection)    Herpes infection    Encounter for IUD removal    Intractable migraine without aura and without status migrainosus    Anxiety    Moderate episode of recurrent major depressive disorder     VSS today. Reports she has been experiencing right shoulder pain x past 5 days and lower back pain for a while. Has a job that requires a lot of heavy lifting and strenuous activity. ROM to shoulder is unaffected. Denies numbness/tingling/weakness to any extremity. Feeling back pain to midline lower back, pain does not radiate. Denies UTI symptoms. Does not believe she is pregnant; has had Mirena in place for > 5 years; due for well woman exam.     Also reports she started suffering with a sore throat yesterday. Also suffering with nasal congestion. Has tried using Mucinex that has not offered much relief. No known sick contacts.     Review of Systems   HENT:  Positive for sore throat.    Musculoskeletal:  Positive for arthralgias and back pain.          Objective     Physical Exam  Vitals reviewed.   Constitutional:       General: She is not in acute distress.     Appearance: Normal appearance. She is well-developed and well-groomed. She is obese.   HENT:      Head: Normocephalic.      Right Ear: Ear canal and external ear normal.      Left Ear: Ear canal and external ear normal.      Ears:      Comments: Hearing aids in place     Nose: Congestion present.      Mouth/Throat:      Mouth: Mucous membranes are moist.      Pharynx: No posterior oropharyngeal erythema.   Eyes:      Extraocular  Movements: Extraocular movements intact.      Pupils: Pupils are equal, round, and reactive to light.   Cardiovascular:      Rate and Rhythm: Normal rate and regular rhythm.      Heart sounds: No murmur heard.  Pulmonary:      Effort: Pulmonary effort is normal. No respiratory distress.   Abdominal:      General: There is no distension.   Musculoskeletal:         General: Tenderness present.      Right shoulder: Tenderness present. Normal range of motion.      Left shoulder: No tenderness. Normal range of motion.        Arms:       Comments: Pain and TTP to area outlined in red   Skin:     General: Skin is warm and dry.      Coloration: Skin is not pale.   Neurological:      Mental Status: She is alert and oriented to person, place, and time.      Gait: Gait normal.   Psychiatric:         Attention and Perception: Attention normal.         Mood and Affect: Mood and affect normal.         Speech: Speech normal.         Behavior: Behavior normal. Behavior is cooperative.         Thought Content: Thought content normal.            Assessment and Plan     1. Strain of right trapezius muscle, initial encounter    2. Acute midline low back pain without sciatica    3. Sore throat  -     POCT Rapid Strep A  -     POCT Influenza A/B Molecular  -     POCT COVID-19 Rapid Screening    4. Well woman exam  -     Ambulatory referral/consult to Obstetrics / Gynecology; Future; Expected date: 03/01/2024  -     POCT Urine Pregnancy    5. Urinary frequency  -     Urinalysis, Reflex to Urine Culture Urine, Clean Catch; Future; Expected date: 02/23/2024    6. Muscular pain  -     ibuprofen (ADVIL,MOTRIN) 800 MG tablet; Take 1 tablet (800 mg total) by mouth 2 (two) times daily as needed for Pain (headache or back pain).  Dispense: 30 tablet; Refill: 0    7. Viral URI        - Rapid strep, flu and COVID testing done today: all negative  - Likely other viral URI; can use ibuprofen for pain relief, may also try salt water gargles  -  Continue with symptomatic relief treatments  - ibuprofen as directed for shoulder pain; take with food  - Heating pad may also offer relief  - Avoid heavy lifting or strenuous activity  - Urine tests ordered today  - Follow up with OB/GYN  - RTC as scheduled, or sooner if needed         Follow up if symptoms worsen or fail to improve.        I spent a total of 30 minutes on the day of the visit. This includes face to face time and non-face to face time preparing to see the patient (eg, review of tests), obtaining and/or reviewing separately obtained history, documenting clinical information in the electronic or other health record, independently interpreting results and communicating results to the patient/family/caregiver, or care coordinator.        (Portions of this note were dictated using voice recognition software and may contain dictation related errors in spelling/grammar/syntax not found on text review)

## 2024-05-10 DIAGNOSIS — M79.10 MUSCULAR PAIN: ICD-10-CM

## 2024-05-11 NOTE — TELEPHONE ENCOUNTER
No care due was identified.  [facility] Embedded Care Due Messages. Reference number: [id number].   5/10/2024 8:14:08 PM CDT

## 2024-05-13 RX ORDER — IBUPROFEN 800 MG/1
800 TABLET ORAL 2 TIMES DAILY PRN
Qty: 30 TABLET | Refills: 0 | Status: SHIPPED | OUTPATIENT
Start: 2024-05-13

## 2024-06-20 DIAGNOSIS — M79.10 MUSCULAR PAIN: ICD-10-CM

## 2024-06-20 NOTE — TELEPHONE ENCOUNTER
Care Due:                  Date            Visit Type   Department     Provider  --------------------------------------------------------------------------------                                MYCHART                              FOLLOWUP/OF  [facility]  Last Visit: 07-      [facility] VISIT   MEDICINE       Alexx Apodaca  Next Visit: None Scheduled  None         None Found                                                            Last  Test          Frequency    Reason                     Performed    Due Date  --------------------------------------------------------------------------------    Office Visit  12 months..  ibuprofen................  07-   07-    [facility] Embedded Care Due Messages. Reference number: [id number].   6/20/2024 12:51:01 PM CDT

## 2024-06-20 NOTE — TELEPHONE ENCOUNTER
Refill Routing Note   Medication(s) are not appropriate for processing by [facility] for the following reason(s):        Outside of protocol    [facility] action(s):  Route   Requires appointment : Yes               Appointments  past 12m or future 3m with PCP    Date Provider   Last Visit   7/10/2023 Alexx Apodaca MD   Next Visit   Visit date not found Alexx Apodaca MD   ED visits in past 90 days: 0        Note composed:4:47 PM 06/20/2024

## 2024-06-21 RX ORDER — IBUPROFEN 800 MG/1
800 TABLET ORAL 2 TIMES DAILY PRN
Qty: 30 TABLET | Refills: 0 | Status: SHIPPED | OUTPATIENT
Start: 2024-06-21

## 2024-06-28 ENCOUNTER — OFFICE VISIT (OUTPATIENT)
Dept: OBSTETRICS AND GYNECOLOGY | Facility: CLINIC | Age: 34
End: 2024-06-28
Payer: COMMERCIAL

## 2024-06-28 VITALS — DIASTOLIC BLOOD PRESSURE: 80 MMHG | WEIGHT: 188.5 LBS | BODY MASS INDEX: 35.62 KG/M2 | SYSTOLIC BLOOD PRESSURE: 115 MMHG

## 2024-06-28 DIAGNOSIS — Z12.4 CERVICAL CANCER SCREENING: ICD-10-CM

## 2024-06-28 DIAGNOSIS — Z01.419 ROUTINE GYNECOLOGICAL EXAMINATION: ICD-10-CM

## 2024-06-28 DIAGNOSIS — Z01.419 WELL WOMAN EXAM: Primary | ICD-10-CM

## 2024-06-28 DIAGNOSIS — Z11.3 SCREENING EXAMINATION FOR STD (SEXUALLY TRANSMITTED DISEASE): ICD-10-CM

## 2024-06-28 DIAGNOSIS — R30.0 DYSURIA: ICD-10-CM

## 2024-06-28 PROCEDURE — 87086 URINE CULTURE/COLONY COUNT: CPT | Performed by: OBSTETRICS & GYNECOLOGY

## 2024-06-28 PROCEDURE — 87491 CHLMYD TRACH DNA AMP PROBE: CPT | Performed by: OBSTETRICS & GYNECOLOGY

## 2024-06-28 PROCEDURE — 87624 HPV HI-RISK TYP POOLED RSLT: CPT | Performed by: OBSTETRICS & GYNECOLOGY

## 2024-06-28 PROCEDURE — 88175 CYTOPATH C/V AUTO FLUID REDO: CPT | Performed by: OBSTETRICS & GYNECOLOGY

## 2024-06-28 PROCEDURE — 99999 PR PBB SHADOW E&M-EST. PATIENT-LVL III: CPT | Mod: PBBFAC,,, | Performed by: OBSTETRICS & GYNECOLOGY

## 2024-06-28 NOTE — PROGRESS NOTES
The patient presents for routine gyn visit.  No cycles with IUD placed since Nov 16, 2022 (in the OR).  Had h/o Retained Mirena IUD which was removed with insertion of new IUD in the OR in Nov 2020.  Patient ok with std testing.  Reports that she sometimes has pain with sex but this is positional.  Having some pains with urination x 1 week.  Started exercising and having pains around the armpits that has improved with time.  Has folliculitis of the vagina that improves with waxing but worsens with shaviing.  No other gyn complaints.    ROS:  GENERAL: Denies weight gain or weight loss. Feeling well overall.   SKIN: Denies rash or lesions.   HEAD: Denies head injury or headache.   CHEST: Denies chest pain or shortness of breath.   CARDIOVASCULAR: Denies palpitations or left sided chest pain.   ABDOMEN: No abdominal pain, constipation, diarrhea, nausea, vomiting or rectal bleeding.   URINARY: No frequency, dysuria, hematuria, or burning on urination.  REPRODUCTIVE: See HPI.   BREASTS: denies pain, lumps, or nipple discharge.   HEMATOLOGIC: No easy bruisability or excessive bleeding.   MUSCULOSKELETAL: Denies joint pain or swelling.   NEUROLOGIC: Denies syncope or weakness.   PSYCHIATRIC: Denies depression, anxiety or mood swings.         PE:   Vitals: /80   Wt 85.5 kg (188 lb 7.9 oz)   BMI 35.62 kg/m²   APPEARANCE: Well nourished, well developed, in no acute distress.  SKIN: Normal skin turgor, no lesions.  ABDOMEN: Soft. No tenderness or masses. No hepatosplenomegaly. No hernias.  BREASTS: Symmetrical, no skin changes or visible lesions. No palpable masses, nipple discharge or adenopathy bilaterally.  PELVIC: Normal external female genitalia without lesions. Normal hair distribution. Adequate perineal body, normal urethral meatus. Vagina moist and well rugated without lesions or discharge. Cervix pink and without lesions. No significant cystocele or rectocele. Bimanual exam showed uterus normal size, shape,  position, mobile and nontender. Adnexa without masses or tenderness. Urethra and bladder normal.  IUD string visualized (short)      AP  Routine gyn  -s/p normal breast exam:   -s/p normal pelvic exam:   -Pap and HPV: collected  -STD testing: gc/chl collected; HIV declined  -contraception: IUD in place  -dysuria: urine cx sent    sonya mandujano MD

## 2024-06-29 LAB
BACTERIA UR CULT: NORMAL
BACTERIA UR CULT: NORMAL
CLINICAL INFO: NORMAL
DATE OF PREVIOUS PAP: NORMAL
DATE PREVIOUS BX: NO
LMP START DATE: NORMAL
SPECIMEN SOURCE CVX/VAG CYTO: NORMAL

## 2024-10-09 DIAGNOSIS — M79.10 MUSCULAR PAIN: ICD-10-CM

## 2024-10-09 NOTE — TELEPHONE ENCOUNTER
Care Due:                  Date            Visit Type   Department     Provider  --------------------------------------------------------------------------------                                MYCHART                              FOLLOWUP/OF  [facility]  Last Visit: 07-      FICE VISIT   MEDICINE       Alexx Apodaca  Next Visit: None Scheduled  None         None Found                                                            Last  Test          Frequency    Reason                     Performed    Due Date  --------------------------------------------------------------------------------    Office Visit  12 months..  desloratadine, ibuprofen.  07-   07-    CBC.........  12 months..  ibuprofen................  12- 11-    Cr..........  12 months..  ibuprofen................  12- 11-    [employer] Embedded Care Due Messages. Reference number: [id number].   10/09/2024 6:13:22 AM CDT

## 2024-10-09 NOTE — TELEPHONE ENCOUNTER
Refill Routing Note   Medication(s) are not appropriate for processing by [facility] for the following reason(s):        Outside of protocol    [facility] action(s):  Route   Requires appointment : Yes     Requires labs : Yes             Appointments  past 12m or future 3m with PCP    Date Provider   Last Visit   7/10/2023 Alexx Apodaca MD   Next Visit   Visit date not found Alexx Apodaca MD   ED visits in past 90 days: 0        Note composed:4:22 PM 10/09/2024

## 2024-10-10 RX ORDER — IBUPROFEN 800 MG/1
800 TABLET ORAL 2 TIMES DAILY PRN
Qty: 30 TABLET | Refills: 0 | Status: SHIPPED | OUTPATIENT
Start: 2024-10-10

## 2024-11-20 DIAGNOSIS — M79.10 MUSCULAR PAIN: ICD-10-CM

## 2024-11-20 RX ORDER — IBUPROFEN 800 MG/1
800 TABLET ORAL 2 TIMES DAILY PRN
Qty: 30 TABLET | Refills: 0 | Status: SHIPPED | OUTPATIENT
Start: 2024-11-20

## 2024-11-20 NOTE — TELEPHONE ENCOUNTER
No care due was identified.  Health [facility] Embedded Care Due Messages. Reference number: [id number].   11/20/2024 6:59:40 AM CST

## 2024-11-20 NOTE — TELEPHONE ENCOUNTER
Refill Routing Note   Medication(s) are not appropriate for processing by [facility] for the following reason(s):        Outside of protocol    [facility] action(s):  Route               Appointments  past 12m or future 3m with PCP    Date Provider   Last Visit   7/10/2023 Alexx Apodaca MD   Next Visit   Visit date not found Alexx Apodaca MD   ED visits in past 90 days: 0        Note composed:11:14 AM 11/20/2024

## 2024-12-06 ENCOUNTER — TELEPHONE (OUTPATIENT)
Dept: FAMILY MEDICINE | Facility: CLINIC | Age: 34
End: 2024-12-06
Payer: COMMERCIAL

## 2024-12-06 NOTE — TELEPHONE ENCOUNTER
LVM offering appointment to see PCP, also told pt to give us a call back if that appointment didn't work out for her.

## 2024-12-11 ENCOUNTER — OFFICE VISIT (OUTPATIENT)
Dept: FAMILY MEDICINE | Facility: CLINIC | Age: 34
End: 2024-12-11
Payer: COMMERCIAL

## 2024-12-11 VITALS — HEART RATE: 94 BPM | HEIGHT: 61 IN | WEIGHT: 189 LBS | BODY MASS INDEX: 35.68 KG/M2 | OXYGEN SATURATION: 99 %

## 2024-12-11 DIAGNOSIS — B96.89 BACTERIAL SINUSITIS: ICD-10-CM

## 2024-12-11 DIAGNOSIS — L91.8 CUTANEOUS SKIN TAGS: ICD-10-CM

## 2024-12-11 DIAGNOSIS — R05.2 SUBACUTE COUGH: ICD-10-CM

## 2024-12-11 DIAGNOSIS — J32.9 BACTERIAL SINUSITIS: ICD-10-CM

## 2024-12-11 DIAGNOSIS — G43.909 MIGRAINE SYNDROME: ICD-10-CM

## 2024-12-11 DIAGNOSIS — Z00.00 ANNUAL PHYSICAL EXAM: Primary | ICD-10-CM

## 2024-12-11 PROCEDURE — 99999 PR PBB SHADOW E&M-EST. PATIENT-LVL IV: CPT | Mod: PBBFAC,,, | Performed by: FAMILY MEDICINE

## 2024-12-11 PROCEDURE — 3008F BODY MASS INDEX DOCD: CPT | Mod: CPTII,S$GLB,, | Performed by: FAMILY MEDICINE

## 2024-12-11 PROCEDURE — 87804 INFLUENZA ASSAY W/OPTIC: CPT | Mod: QW,S$GLB,, | Performed by: FAMILY MEDICINE

## 2024-12-11 PROCEDURE — 87635 SARS-COV-2 COVID-19 AMP PRB: CPT | Mod: QW,S$GLB,, | Performed by: FAMILY MEDICINE

## 2024-12-11 PROCEDURE — 1160F RVW MEDS BY RX/DR IN RCRD: CPT | Mod: CPTII,S$GLB,, | Performed by: FAMILY MEDICINE

## 2024-12-11 PROCEDURE — 99395 PREV VISIT EST AGE 18-39: CPT | Mod: S$GLB,,, | Performed by: FAMILY MEDICINE

## 2024-12-11 PROCEDURE — 1159F MED LIST DOCD IN RCRD: CPT | Mod: CPTII,S$GLB,, | Performed by: FAMILY MEDICINE

## 2024-12-11 RX ORDER — PROMETHAZINE HYDROCHLORIDE AND DEXTROMETHORPHAN HYDROBROMIDE 6.25; 15 MG/5ML; MG/5ML
5 SYRUP ORAL EVERY 8 HOURS PRN
Qty: 118 ML | Refills: 0 | Status: SHIPPED | OUTPATIENT
Start: 2024-12-11 | End: 2024-12-21

## 2024-12-11 RX ORDER — AZITHROMYCIN 250 MG/1
TABLET, FILM COATED ORAL
Qty: 6 TABLET | Refills: 0 | Status: SHIPPED | OUTPATIENT
Start: 2024-12-11 | End: 2024-12-16

## 2024-12-11 RX ORDER — IBUPROFEN 800 MG/1
800 TABLET ORAL 2 TIMES DAILY PRN
Qty: 30 TABLET | Refills: 0 | Status: SHIPPED | OUTPATIENT
Start: 2024-12-11

## 2024-12-11 RX ORDER — PREDNISONE 5 MG/1
5 TABLET ORAL 2 TIMES DAILY
Qty: 10 TABLET | Refills: 0 | Status: SHIPPED | OUTPATIENT
Start: 2024-12-11 | End: 2024-12-16

## 2024-12-12 ENCOUNTER — HOSPITAL ENCOUNTER (OUTPATIENT)
Dept: RADIOLOGY | Facility: HOSPITAL | Age: 34
Discharge: HOME | End: 2024-12-12
Attending: FAMILY MEDICINE
Payer: COMMERCIAL

## 2024-12-12 DIAGNOSIS — R05.2 SUBACUTE COUGH: ICD-10-CM

## 2024-12-12 PROCEDURE — 71046 X-RAY EXAM CHEST 2 VIEWS: CPT | Mod: TC,FY

## 2024-12-12 PROCEDURE — 71046 X-RAY EXAM CHEST 2 VIEWS: CPT | Mod: 26,,, | Performed by: RADIOLOGY

## 2024-12-12 NOTE — PROGRESS NOTES
Subjective     Patient ID: Jessi Samson is a 34 y.o. female.    Chief Complaint: Chest Pain, Generalized Body Aches, Sore Throat, Cough, and Nasal Congestion    34 years old came to the clinic for her physical examination.  Patient also with respiratory symptoms for around 1 month.  Patient with sinus pressure and postnasal drip.  Patient reports cough associated with blood the last time.  No fever or chills.  Patient with multiple skin tags requesting possible removal.    Sore Throat   This is a new problem. The current episode started 1 to 4 weeks ago. The problem has been unchanged. There has been no fever. Associated symptoms include congestion and coughing. She has tried nothing for the symptoms. The treatment provided no relief.   Cough  This is a chronic problem. The current episode started 1 to 4 weeks ago. The problem has been unchanged. The cough is Productive of blood-tinged sputum. Associated symptoms include a sore throat. Nothing aggravates the symptoms. She has tried nothing for the symptoms. The treatment provided no relief.   Sinusitis  This is a recurrent problem. The current episode started 1 to 4 weeks ago. The problem is unchanged. Associated symptoms include congestion, coughing, sinus pressure and a sore throat. Past treatments include nothing. The treatment provided no relief.     Review of Systems   Constitutional: Negative.    HENT:  Positive for nasal congestion, sinus pressure/congestion and sore throat.    Eyes: Negative.    Respiratory:  Positive for cough.    Cardiovascular: Negative.    Gastrointestinal: Negative.    Genitourinary: Negative.    Musculoskeletal: Negative.    Neurological: Negative.    Psychiatric/Behavioral: Negative.            Objective     Physical Exam  Constitutional:       General: She is not in acute distress.     Appearance: She is well-developed. She is not diaphoretic.   HENT:      Head: Normocephalic and atraumatic.      Right Ear: External ear  normal.      Left Ear: External ear normal.      Nose: Nose normal.      Mouth/Throat:      Pharynx: No oropharyngeal exudate.   Eyes:      General: No scleral icterus.        Right eye: No discharge.         Left eye: No discharge.      Conjunctiva/sclera: Conjunctivae normal.      Pupils: Pupils are equal, round, and reactive to light.   Neck:      Thyroid: No thyromegaly.      Vascular: No JVD.      Trachea: No tracheal deviation.   Cardiovascular:      Rate and Rhythm: Normal rate and regular rhythm.      Heart sounds: Normal heart sounds. No murmur heard.     No friction rub. No gallop.   Pulmonary:      Effort: Pulmonary effort is normal. No respiratory distress.      Breath sounds: Normal breath sounds. No stridor. No wheezing or rales.   Chest:      Chest wall: No tenderness.   Abdominal:      General: Bowel sounds are normal. There is no distension.      Palpations: Abdomen is soft. There is no mass.      Tenderness: There is no abdominal tenderness. There is no guarding or rebound.   Musculoskeletal:         General: No tenderness. Normal range of motion.      Cervical back: Normal range of motion and neck supple.   Lymphadenopathy:      Cervical: No cervical adenopathy.   Skin:     General: Skin is warm and dry.      Coloration: Skin is not pale.      Findings: No erythema or rash.   Neurological:      Mental Status: She is alert and oriented to person, place, and time.      Cranial Nerves: No cranial nerve deficit.      Motor: No abnormal muscle tone.      Coordination: Coordination normal.      Deep Tendon Reflexes: Reflexes are normal and symmetric.   Psychiatric:         Behavior: Behavior normal.         Thought Content: Thought content normal.         Judgment: Judgment normal.            Assessment and Plan     1. Annual physical exam  -     Urinalysis; Future  -     Comprehensive Metabolic Panel; Future; Expected date: 12/11/2024  -     Lipid Panel; Future; Expected date: 12/11/2024  -     TSH;  Future; Expected date: 12/11/2024  -     CBC Auto Differential; Future; Expected date: 12/11/2024  -     Hemoglobin A1C; Future; Expected date: 12/11/2024    2. Subacute cough  -     POCT COVID-19 Rapid Screening  -     POCT Influenza A/B  -     promethazine-dextromethorphan (PROMETHAZINE-DM) 6.25-15 mg/5 mL Syrp; Take 5 mLs by mouth every 8 (eight) hours as needed (cough).  Dispense: 118 mL; Refill: 0  -     X-Ray Chest PA And Lateral; Future; Expected date: 12/11/2024    3. Migraine syndrome  -     ibuprofen (ADVIL,MOTRIN) 800 MG tablet; Take 1 tablet (800 mg total) by mouth 2 (two) times daily as needed for Pain (headache or back pain).  Dispense: 30 tablet; Refill: 0    4. Cutaneous skin tags  -     Ambulatory referral/consult to Dermatology; Future; Expected date: 12/18/2024    5. Bacterial sinusitis  -     predniSONE (DELTASONE) 5 MG tablet; Take 1 tablet (5 mg total) by mouth 2 (two) times daily. for 5 days  Dispense: 10 tablet; Refill: 0  -     azithromycin (Z-KOREY) 250 MG tablet; Take 2 tablets by mouth on day 1; Take 1 tablet by mouth on days 2-5  Dispense: 6 tablet; Refill: 0  -     promethazine-dextromethorphan (PROMETHAZINE-DM) 6.25-15 mg/5 mL Syrp; Take 5 mLs by mouth every 8 (eight) hours as needed (cough).  Dispense: 118 mL; Refill: 0                 Follow up if symptoms worsen or fail to improve.

## 2025-01-06 DIAGNOSIS — G43.909 MIGRAINE SYNDROME: ICD-10-CM

## 2025-01-06 NOTE — TELEPHONE ENCOUNTER
No care due was identified.  Health [facility] Embedded Care Due Messages. Reference number: [id number].   1/06/2025 10:52:10 AM CST

## 2025-01-07 NOTE — TELEPHONE ENCOUNTER
Refill Routing Note   Medication(s) are not appropriate for processing by [facility] for the following reason(s):        Outside of protocol    [facility] action(s):  Route             Appointments  past 12m or future 3m with PCP    Date Provider   Last Visit   12/11/2024 Alexx Apodaca MD   Next Visit   Visit date not found Alexx Apodaca MD   ED visits in past 90 days: 0        Note composed:11:35 PM 01/06/2025

## 2025-01-08 RX ORDER — IBUPROFEN 800 MG/1
800 TABLET ORAL 2 TIMES DAILY PRN
Qty: 30 TABLET | Refills: 0 | Status: SHIPPED | OUTPATIENT
Start: 2025-01-08

## 2025-02-13 DIAGNOSIS — G43.909 MIGRAINE SYNDROME: ICD-10-CM

## 2025-02-13 NOTE — TELEPHONE ENCOUNTER
No care due was identified.  Health [facility] Embedded Care Due Messages. Reference number: [id number].   2/13/2025 3:38:44 PM CST

## 2025-02-14 RX ORDER — IBUPROFEN 800 MG/1
800 TABLET ORAL 2 TIMES DAILY PRN
Qty: 30 TABLET | Refills: 0 | Status: SHIPPED | OUTPATIENT
Start: 2025-02-14

## 2025-03-08 DIAGNOSIS — G43.909 MIGRAINE SYNDROME: ICD-10-CM

## 2025-03-08 NOTE — TELEPHONE ENCOUNTER
No care due was identified.  [facility] Embedded Care Due Messages. Reference number: [id number].   3/08/2025 5:15:14 PM CST

## 2025-03-10 RX ORDER — IBUPROFEN 800 MG/1
800 TABLET ORAL 2 TIMES DAILY PRN
Qty: 30 TABLET | Refills: 0 | Status: SHIPPED | OUTPATIENT
Start: 2025-03-10

## 2025-03-10 NOTE — TELEPHONE ENCOUNTER
Refill Routing Note   Medication(s) are not appropriate for processing by [facility] for the following reason(s):        Outside of protocol    [facility] action(s):  Route               Appointments  past 12m or future 3m with PCP    Date Provider   Last Visit   12/11/2024 Alexx Apodaca MD   Next Visit   Visit date not found Alexx Apodaca MD   ED visits in past 90 days: 0        Note composed:8:09 AM 03/10/2025

## 2025-03-21 ENCOUNTER — OFFICE VISIT (OUTPATIENT)
Dept: DERMATOLOGY | Facility: CLINIC | Age: 35
End: 2025-03-21
Payer: COMMERCIAL

## 2025-03-21 DIAGNOSIS — L91.8 SKIN TAG: ICD-10-CM

## 2025-03-21 DIAGNOSIS — L82.0 SEBORRHEIC KERATOSES, INFLAMED: Primary | ICD-10-CM

## 2025-03-21 PROCEDURE — 99999 PR PBB SHADOW E&M-EST. PATIENT-LVL III: CPT | Mod: PBBFAC,,, | Performed by: STUDENT IN AN ORGANIZED HEALTH CARE EDUCATION/TRAINING PROGRAM

## 2025-03-21 NOTE — PROGRESS NOTES
Subjective:      Patient ID:  Jessi Samson is a 34 y.o. female who presents for   Chief Complaint   Patient presents with    Skin Tags     Face , necks      Pt is here for skin tags that are painful and bleeding        # [id number]    Review of Systems   Skin:  Positive for itching. Negative for rash and dry skin.       Objective:   Physical Exam   Constitutional: She appears well-developed and well-nourished. No distress.   Neurological: She is alert and oriented to person, place, and time. She is not disoriented.   Psychiatric: She has a normal mood and affect.   Skin:   Areas Examined (abnormalities noted in diagram):   Head / Face Inspection Performed  Neck Inspection Performed            Diagram Legend     Erythematous scaling macule/papule c/w actinic keratosis       Vascular papule c/w angioma      Pigmented verrucoid papule/plaque c/w seborrheic keratosis      Yellow umbilicated papule c/w sebaceous hyperplasia      Irregularly shaped tan macule c/w lentigo     1-2 mm smooth white papules consistent with Milia      Movable subcutaneous cyst with punctum c/w epidermal inclusion cyst      Subcutaneous movable cyst c/w pilar cyst      Firm pink to brown papule c/w dermatofibroma      Pedunculated fleshy papule(s) c/w skin tag(s)      Evenly pigmented macule c/w junctional nevus     Mildly variegated pigmented, slightly irregular-bordered macule c/w mildly atypical nevus      Flesh colored to evenly pigmented papule c/w intradermal nevus       Pink pearly papule/plaque c/w basal cell carcinoma      Erythematous hyperkeratotic cursted plaque c/w SCC      Surgical scar with no sign of skin cancer recurrence      Open and closed comedones      Inflammatory papules and pustules      Verrucoid papule consistent consistent with wart     Erythematous eczematous patches and plaques     Dystrophic onycholytic nail with subungual debris c/w onychomycosis     Umbilicated papule    Erythematous-base  heme-crusted tan verrucoid plaque consistent with inflamed seborrheic keratosis     Erythematous Silvery Scaling Plaque c/w Psoriasis     See annotation      Assessment / Plan:        Seborrheic keratoses, inflamed--left lower eyelid  Lesion was painful    Verbal consent obtained. 1 lesions removed with scissor snip removal after anesthesia with 1% lidocaine with epinephrine. Hemostasis achieved with aluminum chloride and hyfrecation. No complications.    Skin tag  -     Ambulatory referral/consult to Dermatology    Reassurance given to patient. No treatment is necessary.   Treatment of benign, asymptomatic lesions may be considered cosmetic.           Follow up if symptoms worsen or fail to improve.

## 2025-04-11 DIAGNOSIS — G43.909 MIGRAINE SYNDROME: ICD-10-CM

## 2025-04-12 RX ORDER — IBUPROFEN 800 MG/1
800 TABLET ORAL 2 TIMES DAILY PRN
Qty: 30 TABLET | Refills: 0 | Status: SHIPPED | OUTPATIENT
Start: 2025-04-12

## 2025-07-15 DIAGNOSIS — G43.909 MIGRAINE SYNDROME: ICD-10-CM

## 2025-07-15 NOTE — TELEPHONE ENCOUNTER
No care due was identified.  [facility] Embedded Care Due Messages. Reference number: [id number].   7/15/2025 4:21:29 PM CDT

## 2025-07-15 NOTE — TELEPHONE ENCOUNTER
Refill Routing Note   Medication(s) are not appropriate for processing by [facility] for the following reason(s):        Outside of protocol    [facility] action(s):  Route               Appointments  past 12m or future 3m with PCP    Date Provider   Last Visit   12/11/2024 Alexx Apodaca MD   Next Visit   12/15/2025 Alexx Apodaca MD   ED visits in past 90 days: 0        Note composed:6:04 PM 07/15/2025

## 2025-07-17 RX ORDER — IBUPROFEN 800 MG/1
800 TABLET, FILM COATED ORAL 2 TIMES DAILY PRN
Qty: 30 TABLET | Refills: 0 | Status: SHIPPED | OUTPATIENT
Start: 2025-07-17

## (undated) DEVICE — PAD PREP 50/CA

## (undated) DEVICE — SEE MEDLINE ITEM 146355

## (undated) DEVICE — SEE MEDLINE ITEM 152622

## (undated) DEVICE — MANIFOLD 4 PORT

## (undated) DEVICE — SEE MEDLINE ITEM 154981

## (undated) DEVICE — SEE MEDLINE ITEM 157117

## (undated) DEVICE — SYR 50CC LL

## (undated) DEVICE — SEE MEDLINE ITEM 157116

## (undated) DEVICE — SEE MEDLINE ITEM 156955

## (undated) DEVICE — GLOVE SURGICAL LATEX SZ 6.5

## (undated) DEVICE — TUBING ARTRL PRESS MNTR M-F 4

## (undated) DEVICE — CONTAINER MULTIPURPOSE/SPECIME

## (undated) DEVICE — ELECTRODE REM PLYHSV RETURN 9

## (undated) DEVICE — Device

## (undated) DEVICE — PANTIES FEMININE NAPKIN LG/XLG

## (undated) DEVICE — GLOVE BIOGEL PIMICRO INDIC 6.5

## (undated) DEVICE — SEE MEDLINE ITEM 157131

## (undated) DEVICE — CATH URETHRAL 16FR RED

## (undated) DEVICE — COVER OVERHEAD SURG LT BLUE